# Patient Record
Sex: MALE | Race: BLACK OR AFRICAN AMERICAN | Employment: OTHER | URBAN - METROPOLITAN AREA
[De-identification: names, ages, dates, MRNs, and addresses within clinical notes are randomized per-mention and may not be internally consistent; named-entity substitution may affect disease eponyms.]

---

## 2021-01-26 ENCOUNTER — TELEPHONE (OUTPATIENT)
Dept: NEUROLOGY | Age: 66
End: 2021-01-26

## 2021-01-26 NOTE — TELEPHONE ENCOUNTER
Called, spoke to pt.    Pt state she will contact her provider to find another facility closer to her

## 2021-02-16 ENCOUNTER — OFFICE VISIT (OUTPATIENT)
Dept: NEUROLOGY | Age: 66
End: 2021-02-16
Payer: MEDICARE

## 2021-02-16 VITALS — BODY MASS INDEX: 29.8 KG/M2 | WEIGHT: 220 LBS | RESPIRATION RATE: 16 BRPM | OXYGEN SATURATION: 98 % | HEIGHT: 72 IN

## 2021-02-16 DIAGNOSIS — M54.12 CERVICAL RADICULOPATHY: ICD-10-CM

## 2021-02-16 DIAGNOSIS — G56.23 ULNAR NEUROPATHY OF BOTH UPPER EXTREMITIES: Primary | ICD-10-CM

## 2021-02-16 DIAGNOSIS — G56.11 RIGHT MEDIAN NERVE NEUROPATHY: ICD-10-CM

## 2021-02-16 PROCEDURE — 95911 NRV CNDJ TEST 9-10 STUDIES: CPT | Performed by: PSYCHIATRY & NEUROLOGY

## 2021-02-16 PROCEDURE — 95886 MUSC TEST DONE W/N TEST COMP: CPT | Performed by: PSYCHIATRY & NEUROLOGY

## 2021-02-16 NOTE — PROGRESS NOTES
[address] [facility]  [address] [facility], [address] [place] [address]  Phone [phone] Fax [phone]  Test Date:  2021    Patient: Camille Esparza : 1955 Physician: Renard Kenny MD   Sex: Male Height: 6' 0\" Ref Phys: M.DIAN Hector MD   ID#: [id number] Weight: 220 lbs. Technician: Anjali Danielle     Patient Complaints:    Bilateral upper extremity distal numbness, left > right    Patient History / Exam:    Camille Esparza is a 76year old RH male referred to [facility] neurology clinic for evaluation of several month history of bilateral upper distal upper extremity/hand numbness, left greater than right. Examination demonstrates 5 out of 5 strength with exception of of FDI and ADM which are 4-5 as well as bilateral ABP which is also 4/5. Referred for evaluation of peripheral neuropathy, polyradiculopathy. NCV & EMG Findings:  Evaluation of the left median motor nerve showed prolonged distal onset latency (4.5 ms) and decreased conduction velocity (Elbow-Wrist, 49 m/s). The left ulnar motor nerve showed decreased conduction velocity (A Elbow-B Elbow, 50 m/s). The right ulnar motor nerve showed decreased conduction velocity (B Elbow-Wrist, 48 m/s) and decreased conduction velocity (A Elbow-B Elbow, 42 m/s). The left median sensory nerve showed prolonged distal peak latency (3.8 ms) and decreased conduction velocity (Wrist-2nd Digit, 37 m/s). The left ulnar sensory nerve showed no response (Wrist). The right ulnar sensory nerve showed reduced amplitude (4.8 µV). All remaining nerves  were within normal limits. All F Wave latencies were within normal limits. Electromyography (EMG) of the left FDI, ABP, biceps and triceps were unremarkable. EMG of left deltoid demonstrated chronic reinnervation changes in the form of large amplitude motor units with reduced recruitment and increased duration.   EMG of upper cervical paraspinal muscles demonstrated increased insertional activity. Impression: This is an abnormal NCS/EMG characterized by evidence for bilateral nonlocalized ulnar neuropathy suspected to be at the elbow bilaterally as well as evidence for left median neuropathy also nonlocalized though suspected to be at the wrist by probability. There is incidental finding of probable C5/6 (primary C5) active and ongoing radiculopathy which is not related to patient's complaints. Recommendations:    Discussed possibility of MRI of cervical spine through patient's primary care, also discussed behavioral management and bracing for suspected median and ulnar neuropathies.   Mention that we would be happy to see the patient here in [facility] if referral to neurology was felt necessary versus a more local provider.  ___________________________  Bri Diaz MD        Nerve Conduction Studies  Anti Sensory Summary Table     Stim Site NR Peak (ms) Norm Peak (ms) P-T Amp (µV) Norm P-T Amp Onset (ms) Site1 Site2 Delta-P (ms) Dist (cm) Carlos (m/s) Norm Carlos (m/s)   Left Median Anti Sensory (2nd Digit)  30°C   Wrist    3.8 <3.6 14.2 >10 3.0 Wrist 2nd Digit 3.8 14.0 37 >39   Right Median Anti Sensory (2nd Digit)  30°C   Wrist    3.5 <3.6 20.6 >10 2.4 Wrist 2nd Digit 3.5 14.0 40 >39   Left Radial Anti Sensory (Base 1st Digit)  30°C   Wrist    2.3 <3.1 25.8  1.6 Wrist Base 1st Digit 2.3 0.0     Left Ulnar Anti Sensory (5th Digit)  30°C   Wrist NR  <3.7  >15.0  Wrist 5th Digit  14.0  >38   Right Ulnar Anti Sensory (5th Digit)  30°C   Wrist    3.5 <3.7 4.8 >15.0 2.8 Wrist 5th Digit 3.5 14.0 40 >38     Motor Summary Table     Stim Site NR Onset (ms) Norm Onset (ms) O-P Amp (mV) Norm O-P Amp Site1 Site2 Delta-0 (ms) Dist (cm) Carlos (m/s) Norm Carlos (m/s)   Left Median Motor (Abd Poll Brev)  30°C   Wrist    4.5 <4.2 6.1 >5 Elbow Wrist 4.9 24.0 49 >50   Elbow    9.4  5.7          Right Median Motor (Abd Poll Brev)  30°C   Wrist    4.0 <4.2 6.8 >5 Elbow Wrist 4.5 25.0 56 >50   Elbow    8.5  6.6          Left Ulnar Motor (Abd Dig Minimi)  30°C   Wrist    3.0 <4.2 11.3 >3 B Elbow Wrist 4.5 24.0 53 >53   B Elbow    7.5  9.0  A Elbow B Elbow 2.0 10.0 50 >53   A Elbow    9.5  10.8          Right Ulnar Motor (Abd Dig Minimi)  30°C   Wrist    2.8 <4.2 9.3 >3 B Elbow Wrist 5.0 24.0 48 >53   B Elbow    7.8  9.7  A Elbow B Elbow 2.4 10.0 42 >53   A Elbow    10.2  9.4            Comparison Summary Table     Stim Site NR Peak (ms) Norm Peak (ms) P-T Amp (µV) Site1 Site2 Delta-P (ms) Norm Delta (ms)   Left Median/Ulnar Palm Comparison (Wrist - 8cm)  30°C   Median Palm    2.0 <2.5 9.0 Median Palm Ulnar Palm 0.2 <0.3   Ulnar Palm    1.8 <2.5 1.7         F Wave Studies     NR F-Lat (ms) Lat Norm (ms) L-R F-Lat (ms) L-R Lat Norm   Left Ulnar (Mrkrs) (Abd Dig Min)  30°C      30.54 <36  <2.5     EMG     Side Muscle Nerve Root Ins Act Fibs Psw Amp Dur Poly Recrt Int Pat Comment   Left Abd Poll Brev Median C8-T1 Nml Nml Nml Nml Nml 0 Nml Nml    Left 1stDorInt Ulnar C8-T1 Nml Nml Nml Nml Nml 0 Nml Nml    Left Biceps Musculocut C5-6 Nml Nml Nml Nml Nml 0 Nml Nml    Left Triceps Radial C6-7-8 Nml Nml Nml Nml Nml 0 Nml Nml    Left Deltoid Axillary C5-6 Nml Nml Nml Incr Incr 0 Red Nml    Left Cervical Parasp Mid Rami C4-6 Incr Nml Nml               Waveforms:

## 2021-03-17 ENCOUNTER — HOSPITAL ENCOUNTER (OUTPATIENT)
Dept: LAB | Age: 66
Discharge: HOME | End: 2021-03-17
Payer: MEDICARE

## 2021-03-17 ENCOUNTER — OFFICE VISIT (OUTPATIENT)
Dept: ORTHOPEDIC SURGERY | Age: 66
End: 2021-03-17
Payer: MEDICARE

## 2021-03-17 ENCOUNTER — HOSPITAL ENCOUNTER (OUTPATIENT)
Dept: GENERAL RADIOLOGY | Age: 66
Discharge: HOME | End: 2021-03-17
Payer: MEDICARE

## 2021-03-17 ENCOUNTER — HOSPITAL ENCOUNTER (OUTPATIENT)
Dept: NON INVASIVE DIAGNOSTICS | Age: 66
Discharge: HOME | End: 2021-03-17
Payer: MEDICARE

## 2021-03-17 VITALS — BODY MASS INDEX: 30.94 KG/M2 | HEIGHT: 71 IN | WEIGHT: 221 LBS

## 2021-03-17 DIAGNOSIS — G56.02 CARPAL TUNNEL SYNDROME ON LEFT: ICD-10-CM

## 2021-03-17 DIAGNOSIS — G56.01 CARPAL TUNNEL SYNDROME ON RIGHT: ICD-10-CM

## 2021-03-17 DIAGNOSIS — M25.531 RIGHT WRIST PAIN: ICD-10-CM

## 2021-03-17 DIAGNOSIS — G56.01 CARPAL TUNNEL SYNDROME ON RIGHT: Primary | ICD-10-CM

## 2021-03-17 DIAGNOSIS — M25.532 LEFT WRIST PAIN: Primary | ICD-10-CM

## 2021-03-17 LAB
ANION GAP SERPL CALC-SCNC: 10 MMOL/L (ref 5–15)
BASOPHILS # BLD: 0 K/UL (ref 0–0.2)
BASOPHILS NFR BLD: 1 % (ref 0–2.5)
BUN SERPL-MCNC: 17 MG/DL (ref 6–20)
BUN/CREAT SERPL: 11 (ref 12–20)
CA-I BLD-MCNC: 9.1 MG/DL (ref 8.5–10.1)
CHLORIDE SERPL-SCNC: 106 MMOL/L (ref 97–108)
CO2 SERPL-SCNC: 30 MMOL/L (ref 21–32)
CREAT SERPL-MCNC: 1.48 MG/DL (ref 0.7–1.3)
EOSINOPHIL # BLD: 0 K/UL (ref 0–0.7)
EOSINOPHIL NFR BLD: 0 % (ref 0.9–2.9)
ERYTHROCYTE [DISTWIDTH] IN BLOOD BY AUTOMATED COUNT: 13.6 % (ref 11.5–14.5)
GLUCOSE SERPL-MCNC: 118 MG/DL (ref 65–100)
HCT VFR BLD AUTO: 43.3 % (ref 41–53)
HGB BLD-MCNC: 14.4 G/DL (ref 13.5–17.5)
LYMPHOCYTES # BLD: 1.5 K/UL (ref 1–4.8)
LYMPHOCYTES NFR BLD: 15 % (ref 20.5–51.1)
MCH RBC QN AUTO: 31 PG (ref 31–34)
MCHC RBC AUTO-ENTMCNC: 33.2 G/DL (ref 31–36)
MCV RBC AUTO: 93.4 FL (ref 80–100)
MONOCYTES # BLD: 0.5 K/UL (ref 0.2–2.4)
MONOCYTES NFR BLD: 5 % (ref 1.7–9.3)
NEUTS SEG # BLD: 8.3 K/UL (ref 1.8–7.7)
NEUTS SEG NFR BLD: 79 % (ref 42–75)
NRBC # BLD: 0 K/UL
NRBC BLD-RTO: 0 PER 100 WBC
PLATELET # BLD AUTO: 213 K/UL
PMV BLD AUTO: 7.9 FL (ref 6.5–11.5)
POTASSIUM SERPL-SCNC: 3 MMOL/L (ref 3.5–5.1)
RBC # BLD AUTO: 4.63 M/UL (ref 4.5–5.9)
SODIUM SERPL-SCNC: 146 MMOL/L (ref 136–145)
WBC # BLD AUTO: 10.4 K/UL (ref 4.4–11.3)

## 2021-03-17 PROCEDURE — 3017F COLORECTAL CA SCREEN DOC REV: CPT | Performed by: ORTHOPAEDIC SURGERY

## 2021-03-17 PROCEDURE — 1101F PT FALLS ASSESS-DOCD LE1/YR: CPT | Performed by: ORTHOPAEDIC SURGERY

## 2021-03-17 PROCEDURE — 85025 COMPLETE CBC W/AUTO DIFF WBC: CPT

## 2021-03-17 PROCEDURE — G8536 NO DOC ELDER MAL SCRN: HCPCS | Performed by: ORTHOPAEDIC SURGERY

## 2021-03-17 PROCEDURE — G8427 DOCREV CUR MEDS BY ELIG CLIN: HCPCS | Performed by: ORTHOPAEDIC SURGERY

## 2021-03-17 PROCEDURE — 36415 COLL VENOUS BLD VENIPUNCTURE: CPT

## 2021-03-17 PROCEDURE — G8510 SCR DEP NEG, NO PLAN REQD: HCPCS | Performed by: ORTHOPAEDIC SURGERY

## 2021-03-17 PROCEDURE — G8419 CALC BMI OUT NRM PARAM NOF/U: HCPCS | Performed by: ORTHOPAEDIC SURGERY

## 2021-03-17 PROCEDURE — 71046 X-RAY EXAM CHEST 2 VIEWS: CPT

## 2021-03-17 PROCEDURE — 99203 OFFICE O/P NEW LOW 30 MIN: CPT | Performed by: ORTHOPAEDIC SURGERY

## 2021-03-17 PROCEDURE — 80048 BASIC METABOLIC PNL TOTAL CA: CPT

## 2021-03-17 RX ORDER — POTASSIUM CHLORIDE 20 MEQ/1
TABLET, EXTENDED RELEASE ORAL
COMMUNITY
Start: 2020-12-29

## 2021-03-17 RX ORDER — LANOLIN ALCOHOL/MO/W.PET/CERES
CREAM (GRAM) TOPICAL
COMMUNITY
Start: 2020-12-29

## 2021-03-17 RX ORDER — PREDNISONE 10 MG/1
TABLET ORAL
COMMUNITY
Start: 2021-02-26

## 2021-03-17 RX ORDER — AMLODIPINE BESYLATE 10 MG/1
TABLET ORAL
COMMUNITY
Start: 2021-02-26

## 2021-03-17 RX ORDER — OLMESARTAN MEDOXOMIL 40 MG/1
TABLET ORAL
COMMUNITY
Start: 2021-02-26

## 2021-03-17 RX ORDER — HYDRALAZINE HYDROCHLORIDE 50 MG/1
TABLET, FILM COATED ORAL
COMMUNITY
Start: 2021-01-23 | End: 2021-03-17 | Stop reason: ALTCHOICE

## 2021-03-17 RX ORDER — HYDRALAZINE HYDROCHLORIDE 25 MG/1
TABLET, FILM COATED ORAL
COMMUNITY
Start: 2020-12-29 | End: 2021-04-07 | Stop reason: ALTCHOICE

## 2021-03-17 RX ORDER — ROSUVASTATIN CALCIUM 10 MG/1
TABLET, COATED ORAL
COMMUNITY
Start: 2021-02-26

## 2021-03-17 NOTE — PROGRESS NOTES
Name: Jonahtan Harry    : 1955     Service Dept: [address] [place] [address] and Sports Medicine    Patient's Pharmacies:    [facility] [address] ALYSON GAMA [facility] [address]  [address] [facility] [address]  Phone: [phone] Fax: [phone]       Chief Complaint   Patient presents with    Hand Pain        Visit Vitals  Ht 5' 11\" (1.803 m)   Wt 221 lb (100.2 kg)   BMI 30.82 kg/m²      No Known Allergies   Current Outpatient Medications   Medication Sig Dispense Refill    potassium chloride (K-DUR, KLOR-CON) 20 mEq tablet TAKE 1 BY MOUTH THREE TIMES DAILY      magnesium oxide (MAG-OX) 400 mg tablet TAKE 1 TABLET BY MOUTH TWICE DAILY      olmesartan (BENICAR) 40 mg tablet       rosuvastatin (CRESTOR) 10 mg tablet       predniSONE (DELTASONE) 10 mg tablet       amLODIPine (NORVASC) 10 mg tablet       hydrALAZINE (APRESOLINE) 25 mg tablet TAKE 1 TABLET BY MOUTH TWICE DAILY        There is no problem list on file for this patient. History reviewed. No pertinent family history. Social History     Socioeconomic History    Marital status:      Spouse name: Not on file    Number of children: Not on file    Years of education: Not on file    Highest education level: Not on file   Tobacco Use    Smoking status: Never Smoker    Smokeless tobacco: Never Used   Substance and Sexual Activity    Alcohol use: Not Currently      History reviewed. No pertinent surgical history. Past Medical History:   Diagnosis Date    Hypertension         I have reviewed and agree with [address] [place] [address] Nw and ROS and intake form in chart and the record furthermore I have reviewed prior medical record(s) regarding this patients care during this appointment.      Review of Systems:   Patient is a pleasant appearing individual, appropriately dressed, well hydrated, well nourished, who is alert, appropriately oriented for age, and in no acute distress with a normal gait and normal affect who does not appear to be in any significant pain. Physical Exam:  Left Hand - Negative Carpal Compression test, Full Range of motion of the wrist, No Instability, Positive for numbness extending from the elbow to the 4th and 5th digit, Mild swelling, Decreased  strength, Positive for muscle atrophy, Good cap refill. Right Hand - Negative Carpal Compression test, Full Range of motion of the wrist, No Instability, Positive for numbness extending from the elbow to the 4th and 5th digit, Mild swelling, Decreased  strength, Positive for muscle atrophy, Good cap refill. A consent for surgery will be documented and signed by the patient or a legal guardian. All questions were answered. The risks of surgery were explained to the patient which include but not limited to infection, reoperation, multiple operations, nerve injury, artery injury, tendon injury, poor result, poor wound healing, unforeseen incidence, etc. Patient was told of no guarantees. Patient accepts all risks and benefits    The patient was counseled about the risks of aidan Covid-19 during their perioperative period and any recovery window from their procedure. The patient was made aware that aidan Covid-19 may worsen their prognosis for recovering from their procedure and lend to a higher morbidity and/or mortality risk. All material risks, benefits, and reasonable alternatives including postponing the procedure were discussed. The patient DOES wish to proceed with their procedure at this time. Encounter Diagnoses     ICD-10-CM ICD-9-CM   1. Carpal tunnel syndrome on right  G56.01 354.0   2. Carpal tunnel syndrome on left  G56.02 354.0       HPI:  The patient is here with a chief complaint of bilateral upper extremity numbness and tingling and ulnar nerve entrapment. Post EMG for both. Continues to have difficulty. Pain is 8/10. ROS:  10-point review of systems is positive for joint infection and locking.     X-rays are unremarkable. Assessment/Plan:  1. Left ulnar nerve entrapment and carpal tunnel syndrome. Plan would be for left carpal tunnel release and ulnar nerve in transposition. Risks and benefits of surgery explained to him. We are going to proceed with basic blood work, chest x-ray, EKG, no medical clearance, outpatient surgery. As part of continued conservative pain management options the patient was advised to utilize Tylenol or OTC NSAIDS as long as it is not medically contraindicated. Return to Office: Follow-up and Dispositions    · Return for [facility] for surgery. Scribed by Angelic Peters MD as dictated by Tracie Padilla. Cheyenne Perez MD.  Documentation True and Accepted Manuel Perez MD

## 2021-03-17 NOTE — PATIENT INSTRUCTIONS
Carpal Tunnel Syndrome: Care Instructions Overview Carpal tunnel syndrome is numbness, tingling, weakness, and pain in your hand, wrist, and sometimes forearm. It is caused by pressure on the median nerve. This nerve and several tough tissues called tendons run through a space in the wrist. This space is called the carpal tunnel. The repeated hand motions used in work and some hobbies and sports can put pressure on the median nerve. Pregnancy can cause carpal tunnel syndrome. Several conditions, such as diabetes, arthritis, and an underactive thyroid, can also cause it. You may be able to limit an activity or change the way you do it to reduce your symptoms. You also can take other steps to feel better. If your symptoms are mild, 1 to 2 weeks of home treatment are likely to ease your pain. Surgery is needed only if other treatments do not work. Follow-up care is a key part of your treatment and safety. Be sure to make and go to all appointments, and call your doctor if you are having problems. It's also a good idea to know your test results and keep a list of the medicines you take. How can you care for yourself at home? · If possible, stop or reduce the activity that causes your symptoms. If you cannot stop the activity, take frequent breaks to rest and stretch or change hand positions to do a task. Try switching hands, such as when using a computer mouse. · Try to avoid bending or twisting your wrists. · Ask your doctor if you can take an over-the-counter pain medicine, such as acetaminophen (Tylenol), ibuprofen (Advil, Motrin), or naproxen (Aleve). Be safe with medicines. Read and follow all instructions on the label. · If your doctor prescribes corticosteroid medicine to help reduce pain and swelling, take it exactly as prescribed. Call your doctor if you think you are having a problem with your medicine. · Put ice or a cold pack on your wrist for 10 to 20 minutes at a time to ease pain.  Put a thin cloth between the ice and your skin. · If your doctor or your physical or occupational therapist tells you to wear a wrist splint, wear it as directed to keep your wrist in a neutral position. This also eases pressure on your median nerve. · Ask your doctor whether you should have physical or occupational therapy to learn how to do tasks differently. · Try a yoga class to stretch your muscles and build strength in your hands and wrists. Yoga has been shown to ease carpal tunnel symptoms. To prevent carpal tunnel · When working at a computer, keep your hands and wrists in line with your forearms. Hold your elbows close to your sides. Take a break every 10 to 15 minutes. · Try these exercises: 
? Warm up: Rotate your wrist up, down, and from side to side. Repeat this 4 times. Stretch your fingers far apart, relax them, then stretch them again. Repeat 4 times. Stretch your thumb by pulling it back gently, holding it, and then releasing it. Repeat 4 times. ? Prayer stretch: Start with your palms together in front of your chest just below your chin. Slowly lower your hands toward your waistline while keeping your hands close to your stomach and your palms together until you feel a mild to moderate stretch under your forearms. Hold for 10 to 20 seconds. Repeat 4 times. ? Wrist flexor stretch: Hold your arm in front of you with your palm up. Bend your wrist, pointing your hand toward the floor. With your other hand, gently bend your wrist further until you feel a mild to moderate stretch in your forearm. Hold for 10 to 20 seconds. Repeat 4 times. ? Wrist extensor stretch: Repeat the steps for the wrist flexor stretch, but begin with your extended hand palm down. · Squeeze a rubber exercise ball several times a day to keep your hands and fingers strong. · Avoid holding objects (such as a book) in one position for a long time. When possible, use your whole hand to grasp an object.  Using just the thumb and index finger can put stress on the wrist. 
· Do not smoke. It can make this condition worse by reducing blood flow to the median nerve. If you need help quitting, talk to your doctor about stop-smoking programs and medicines. These can increase your chances of quitting for good. When should you call for help? Watch closely for changes in your health, and be sure to contact your doctor if: 
  · Your pain or other problems do not get better with home care.  
  · You want more information about physical or occupational therapy.  
  · You have side effects of your corticosteroid medicine, such as: 
? Weight gain. ? Mood changes. ? Trouble sleeping. ? Bruising easily.  
  · You have any other problems with your medicine. Where can you learn more? Go to http://www.gray.com/ Enter R432 in the search box to learn more about \"Carpal Tunnel Syndrome: Care Instructions. \" Current as of: March 2, 2020               Content Version: 12.6 © 5644-9091 [employer]. Care instructions adapted under license by [employer] (which disclaims liability or warranty for this information). If you have questions about a medical condition or this instruction, always ask your healthcare professional. [place] [address] any warranty or liability for your use of this information.

## 2021-03-22 ENCOUNTER — OFFICE VISIT (OUTPATIENT)
Dept: ORTHOPEDIC SURGERY | Age: 66
End: 2021-03-22
Payer: MEDICARE

## 2021-03-22 ENCOUNTER — HOSPITAL ENCOUNTER (OUTPATIENT)
Dept: PREADMISSION TESTING | Age: 66
Discharge: HOME | End: 2021-03-22
Payer: MEDICARE

## 2021-03-22 ENCOUNTER — ANESTHESIA EVENT (OUTPATIENT)
Dept: SURGERY | Age: 66
End: 2021-03-22
Payer: MEDICARE

## 2021-03-22 DIAGNOSIS — G56.02 CARPAL TUNNEL SYNDROME ON LEFT: Primary | ICD-10-CM

## 2021-03-22 DIAGNOSIS — M25.532 LEFT WRIST PAIN: ICD-10-CM

## 2021-03-22 DIAGNOSIS — G56.22 ULNAR NERVE ENTRAPMENT AT ELBOW, LEFT: ICD-10-CM

## 2021-03-22 LAB — SARS-COV-2, COV2: NORMAL

## 2021-03-22 PROCEDURE — U0003 INFECTIOUS AGENT DETECTION BY NUCLEIC ACID (DNA OR RNA); SEVERE ACUTE RESPIRATORY SYNDROME CORONAVIRUS 2 (SARS-COV-2) (CORONAVIRUS DISEASE [COVID-19]), AMPLIFIED PROBE TECHNIQUE, MAKING USE OF HIGH THROUGHPUT TECHNOLOGIES AS DESCRIBED BY CMS-2020-01-R: HCPCS

## 2021-03-22 PROCEDURE — 3017F COLORECTAL CA SCREEN DOC REV: CPT | Performed by: ORTHOPAEDIC SURGERY

## 2021-03-22 PROCEDURE — 1101F PT FALLS ASSESS-DOCD LE1/YR: CPT | Performed by: ORTHOPAEDIC SURGERY

## 2021-03-22 PROCEDURE — G8432 DEP SCR NOT DOC, RNG: HCPCS | Performed by: ORTHOPAEDIC SURGERY

## 2021-03-22 PROCEDURE — G8427 DOCREV CUR MEDS BY ELIG CLIN: HCPCS | Performed by: ORTHOPAEDIC SURGERY

## 2021-03-22 PROCEDURE — 99214 OFFICE O/P EST MOD 30 MIN: CPT | Performed by: ORTHOPAEDIC SURGERY

## 2021-03-22 PROCEDURE — G8417 CALC BMI ABV UP PARAM F/U: HCPCS | Performed by: ORTHOPAEDIC SURGERY

## 2021-03-22 PROCEDURE — G8536 NO DOC ELDER MAL SCRN: HCPCS | Performed by: ORTHOPAEDIC SURGERY

## 2021-03-22 RX ORDER — OXYCODONE AND ACETAMINOPHEN 5; 325 MG/1; MG/1
1 TABLET ORAL
Qty: 30 TAB | Refills: 0 | Status: SHIPPED | OUTPATIENT
Start: 2021-03-22 | End: 2021-04-05

## 2021-03-22 RX ORDER — ONDANSETRON 4 MG/1
4 TABLET, ORALLY DISINTEGRATING ORAL AS NEEDED
Qty: 10 TAB | Refills: 0 | Status: SHIPPED | OUTPATIENT
Start: 2021-03-22

## 2021-03-22 NOTE — PATIENT INSTRUCTIONS
Arm Pain: Care Instructions Your Care Instructions You can hurt your arm by using it too much or by injuring it. Biking, wrestling, and home repair projects are examples of activities that can lead to arm pain. Everyday wear and tear, especially as you get older, can cause arm pain. Your forearms, wrists, hands, and fingers are the parts of your arm that are most likely to become painful. A minor arm injury usually will heal on its own with home treatment to relieve swelling and pain. If you have a more serious injury, you may need tests and treatment. Follow-up care is a key part of your treatment and safety. Be sure to make and go to all appointments, and call your doctor if you are having problems. It's also a good idea to know your test results and keep a list of the medicines you take. How can you care for yourself at home? · Take pain medicines exactly as directed. ? If the doctor gave you a prescription medicine for pain, take it as prescribed. ? If you are not taking a prescription pain medicine, ask your doctor if you can take an over-the-counter medicine. · Rest and protect your arm. Take a break from any activity that may cause pain. · Put ice or a cold pack on your arm for 10 to 20 minutes at a time. Put a thin cloth between the ice and your skin. · Prop up the sore arm on a pillow when you ice it or anytime you sit or lie down during the next 3 days. Try to keep it above the level of your heart. This will help reduce swelling. · If your doctor recommends a sling to support your arm, wear it as directed. When should you call for help? Call 911 anytime you think you may need emergency care. For example, call if: 
  · Your arm or hand is cool or pale or changes color. Call your doctor now or seek immediate medical care if: 
  · You cannot use your arm.  
  · You have signs of infection, such as: 
? Increased pain, swelling, warmth, or redness.  
? Red streaks running up or down your arm. 
? Pus draining from an area of your arm. ? A fever.  
  · You have tingling, weakness, or numbness in your arm. Watch closely for changes in your health, and be sure to contact your doctor if: 
  · You do not get better as expected. Where can you learn more? Go to http://www.boland.com/ Enter B641 in the search box to learn more about \"Arm Pain: Care Instructions. \" Current as of: June 26, 2019               Content Version: 12.6 © 2111-9283 [employer], [employer]. Care instructions adapted under license by [employer] (which disclaims liability or warranty for this information). If you have questions about a medical condition or this instruction, always ask your healthcare professional. [address] any warranty or liability for your use of this information.

## 2021-03-22 NOTE — PROGRESS NOTES
Name: Francis Almonte    : 1955     Service Dept: [address] [place] [address] and Sports Medicine    Patient's Pharmacies:    [address] [place] [address] [address] [facility], [facility]  [address] [address]  Phone: [phone] Fax: [phone]       Chief Complaint   Patient presents with    Hand Pain    Pre-op Exam        There were no vitals taken for this visit. No Known Allergies   Current Outpatient Medications   Medication Sig Dispense Refill    potassium chloride (K-DUR, KLOR-CON) 20 mEq tablet TAKE 1 BY MOUTH THREE TIMES DAILY      magnesium oxide (MAG-OX) 400 mg tablet TAKE 1 TABLET BY MOUTH TWICE DAILY      olmesartan (BENICAR) 40 mg tablet       rosuvastatin (CRESTOR) 10 mg tablet       predniSONE (DELTASONE) 10 mg tablet       amLODIPine (NORVASC) 10 mg tablet       hydrALAZINE (APRESOLINE) 25 mg tablet TAKE 1 TABLET BY MOUTH TWICE DAILY        There is no problem list on file for this patient. History reviewed. No pertinent family history. Social History     Socioeconomic History    Marital status:      Spouse name: Not on file    Number of children: Not on file    Years of education: Not on file    Highest education level: Not on file   Tobacco Use    Smoking status: Never Smoker    Smokeless tobacco: Never Used   Substance and Sexual Activity    Alcohol use: Not Currently      History reviewed. No pertinent surgical history. Past Medical History:   Diagnosis Date    Hypertension         I have reviewed and agree with [address] [place] [address] and ROS and intake form in chart and the record furthermore I have reviewed prior medical record(s) regarding this patients care during this appointment.      Review of Systems:   Patient is a pleasant appearing individual, appropriately dressed, well hydrated, well nourished, who is alert, appropriately oriented for age, and in no acute distress with a normal gait and normal affect who does not appear to be in any significant pain. Physical Exam:  Left Wrist - Positive Carpal Compression test, Full Range of motion of the wrist, No Instability, Positive for numbness, Mild swelling, Decreased  strength, Positive for muscle atrophy, Good cap refill. Right Wrist - Negative for Carpal Compression test, Ful Range of motion of the wrist, No Instability, No Numbness, No Swelling, No Weakness, No Muscle atrophy, Good cap refill. Encounter Diagnoses     ICD-10-CM ICD-9-CM   1. Carpal tunnel syndrome on left  G56.02 354.0   2. Ulnar nerve entrapment at elbow, left  G56.22 354.2   3. Left wrist pain  M25.532 719.43       HPI:  The patient is here with a chief complaint of left lower extremity numbness and tingling, diagnosed with carpal tunnel syndrome, and also ulnar nerve entrapment. It has been the same. Nothing has helped. Pain is 8/10. ROS:  10-point review of systems is positive for joint swelling and locking. X-rays of the left hand were unremarkable. Assessment/Plan:  1. Left upper extremity with carpal tunnel syndrome and ulnar nerve entrapment. Plan would be for left carpal tunnel release and ulnar nerve transposition, Percocet for pain, outpatient surgery. As part of continued conservative pain management options the patient was advised to utilize Tylenol or OTC NSAIDS as long as it is not medically contraindicated. Return to Office: Follow-up and Dispositions    · Return for already scheduled for surgery. Scribed by Fabi Batres LPN as dictated by [facility] MARIBEL Kelly MD.  Documentation True and Accepted Manuel Kelly MD

## 2021-03-22 NOTE — H&P (VIEW-ONLY)
Name: Claudene Chew   
: 1955 Service Dept: [address] [place] [address] and [facility] Patient's Pharmacies: 
 
40[address] [facility] 8955 [facility], 7168 [facility] 
0557 [facility] [address] [id number] Phone: [phone] Fax: [phone] Chief Complaint Patient presents with  
 Hand Pain  Pre-op Exam  
 
  
There were no vitals taken for this visit. No Known Allergies Current Outpatient Medications Medication Sig Dispense Refill  potassium chloride (K-DUR, KLOR-CON) 20 mEq tablet TAKE 1 BY MOUTH THREE TIMES DAILY  magnesium oxide (MAG-OX) 400 mg tablet TAKE 1 TABLET BY MOUTH TWICE DAILY  olmesartan (BENICAR) 40 mg tablet  rosuvastatin (CRESTOR) 10 mg tablet  predniSONE (DELTASONE) 10 mg tablet  amLODIPine (NORVASC) 10 mg tablet  hydrALAZINE (APRESOLINE) 25 mg tablet TAKE 1 TABLET BY MOUTH TWICE DAILY There is no problem list on file for this patient. History reviewed. No pertinent family history. Social History Socioeconomic History  Marital status:  Spouse name: Not on file  Number of children: Not on file  Years of education: Not on file  Highest education level: Not on file Tobacco Use  Smoking status: Never Smoker  Smokeless tobacco: Never Used Substance and Sexual Activity  Alcohol use: Not Currently History reviewed. No pertinent surgical history. Past Medical History:  
Diagnosis Date  Hypertension I have reviewed and agree with [address] [place] [address] Nw and ROS and intake form in chart and the record furthermore I have reviewed prior medical record(s) regarding this patients care during this appointment.   
 
Review of Systems:  
Patient is a pleasant appearing individual, appropriately dressed, well hydrated, well nourished, who is alert, appropriately oriented for age, and in no acute distress with a normal gait and normal affect who does not appear to be in any significant pain. Physical Exam: 
Left Wrist - Positive Carpal Compression test, Full Range of motion of the wrist, No Instability, Positive for numbness, Mild swelling, Decreased  strength, Positive for muscle atrophy, Good cap refill. Right Wrist - Negative for Carpal Compression test, Ful Range of motion of the wrist, No Instability, No Numbness, No Swelling, No Weakness, No Muscle atrophy, Good cap refill. Encounter Diagnoses ICD-10-CM ICD-9-CM 1. Carpal tunnel syndrome on left  G56.02 354.0 2. Ulnar nerve entrapment at elbow, left  G56.22 354.2 3. Left wrist pain  M25.532 719.43  
 
 
HPI: 
The patient is here with a chief complaint of left lower extremity numbness and tingling, diagnosed with carpal tunnel syndrome, and also ulnar nerve entrapment. It has been the same. Nothing has helped. Pain is 8/10. ROS: 
10-point review of systems is positive for joint swelling and locking. X-rays of the left hand were unremarkable. Assessment/Plan: 1. Left upper extremity with carpal tunnel syndrome and ulnar nerve entrapment. Plan would be for left carpal tunnel release and ulnar nerve transposition, Percocet for pain, outpatient surgery. As part of continued conservative pain management options the patient was advised to utilize Tylenol or OTC NSAIDS as long as it is not medically contraindicated. Return to Office: Follow-up and Dispositions · Return for already scheduled for surgery. Scribed by Fabi Batres LPN as dictated by [facility] MARIBEL Kelly MD. Documentation True and Accepted Manuel Kelly MD

## 2021-03-23 LAB — SARS-COV-2, COV2NT: NOT DETECTED

## 2021-03-29 ENCOUNTER — HOSPITAL ENCOUNTER (OUTPATIENT)
Age: 66
Discharge: HOME | End: 2021-03-29
Attending: ORTHOPAEDIC SURGERY | Admitting: ORTHOPAEDIC SURGERY
Payer: MEDICARE

## 2021-03-29 ENCOUNTER — ANESTHESIA (OUTPATIENT)
Dept: SURGERY | Age: 66
End: 2021-03-29
Payer: MEDICARE

## 2021-03-29 VITALS
WEIGHT: 222 LBS | OXYGEN SATURATION: 95 % | HEIGHT: 71 IN | SYSTOLIC BLOOD PRESSURE: 156 MMHG | DIASTOLIC BLOOD PRESSURE: 85 MMHG | RESPIRATION RATE: 17 BRPM | BODY MASS INDEX: 31.08 KG/M2 | HEART RATE: 68 BPM | TEMPERATURE: 96.8 F

## 2021-03-29 PROBLEM — M17.9 KNEE OSTEOARTHRITIS: Status: ACTIVE | Noted: 2021-03-29

## 2021-03-29 PROCEDURE — 77030000032 HC CUF TRNQT ZIMM -B: Performed by: ORTHOPAEDIC SURGERY

## 2021-03-29 PROCEDURE — 93005 ELECTROCARDIOGRAM TRACING: CPT

## 2021-03-29 PROCEDURE — 76210000021 HC REC RM PH II 0.5 TO 1 HR: Performed by: ORTHOPAEDIC SURGERY

## 2021-03-29 PROCEDURE — 77030006605 HC BLD CRPL IN BIOM -C: Performed by: ORTHOPAEDIC SURGERY

## 2021-03-29 PROCEDURE — 74011250636 HC RX REV CODE- 250/636: Performed by: NURSE ANESTHETIST, CERTIFIED REGISTERED

## 2021-03-29 PROCEDURE — 74011250637 HC RX REV CODE- 250/637: Performed by: ORTHOPAEDIC SURGERY

## 2021-03-29 PROCEDURE — 77030002933 HC SUT MCRYL J&J -A: Performed by: ORTHOPAEDIC SURGERY

## 2021-03-29 PROCEDURE — 76210000006 HC OR PH I REC 0.5 TO 1 HR: Performed by: ORTHOPAEDIC SURGERY

## 2021-03-29 PROCEDURE — 74011000258 HC RX REV CODE- 258: Performed by: ORTHOPAEDIC SURGERY

## 2021-03-29 PROCEDURE — 77030012406 HC DRN WND PENRS BARD -A: Performed by: ORTHOPAEDIC SURGERY

## 2021-03-29 PROCEDURE — 77030040356 HC CORD BPLR FRCP COVD -A: Performed by: ORTHOPAEDIC SURGERY

## 2021-03-29 PROCEDURE — 76010000153 HC OR TIME 1.5 TO 2 HR: Performed by: ORTHOPAEDIC SURGERY

## 2021-03-29 PROCEDURE — 77030031139 HC SUT VCRL2 J&J -A: Performed by: ORTHOPAEDIC SURGERY

## 2021-03-29 PROCEDURE — 74011000250 HC RX REV CODE- 250: Performed by: ORTHOPAEDIC SURGERY

## 2021-03-29 PROCEDURE — 76060000034 HC ANESTHESIA 1.5 TO 2 HR: Performed by: ORTHOPAEDIC SURGERY

## 2021-03-29 PROCEDURE — 77030010509 HC AIRWY LMA MSK TELE -A: Performed by: NURSE ANESTHETIST, CERTIFIED REGISTERED

## 2021-03-29 PROCEDURE — 77030014007 HC SPNG HEMSTAT J&J -B: Performed by: ORTHOPAEDIC SURGERY

## 2021-03-29 PROCEDURE — 74011000272 HC RX REV CODE- 272: Performed by: ORTHOPAEDIC SURGERY

## 2021-03-29 PROCEDURE — 2709999900 HC NON-CHARGEABLE SUPPLY: Performed by: ORTHOPAEDIC SURGERY

## 2021-03-29 PROCEDURE — 77030006689 HC BLD OPHTH BVR BD -A: Performed by: ORTHOPAEDIC SURGERY

## 2021-03-29 PROCEDURE — 77030041614 HC WRP CLD THER BREG -B: Performed by: ORTHOPAEDIC SURGERY

## 2021-03-29 PROCEDURE — 77030002916 HC SUT ETHLN J&J -A: Performed by: ORTHOPAEDIC SURGERY

## 2021-03-29 PROCEDURE — 74011250636 HC RX REV CODE- 250/636: Performed by: ORTHOPAEDIC SURGERY

## 2021-03-29 RX ORDER — KETOROLAC TROMETHAMINE 30 MG/ML
INJECTION, SOLUTION INTRAMUSCULAR; INTRAVENOUS AS NEEDED
Status: DISCONTINUED | OUTPATIENT
Start: 2021-03-29 | End: 2021-03-29 | Stop reason: HOSPADM

## 2021-03-29 RX ORDER — FENTANYL CITRATE 50 UG/ML
INJECTION, SOLUTION INTRAMUSCULAR; INTRAVENOUS AS NEEDED
Status: DISCONTINUED | OUTPATIENT
Start: 2021-03-29 | End: 2021-03-29 | Stop reason: HOSPADM

## 2021-03-29 RX ORDER — CELECOXIB 200 MG/1
400 CAPSULE ORAL
Status: DISCONTINUED | OUTPATIENT
Start: 2021-03-29 | End: 2021-03-29

## 2021-03-29 RX ORDER — INSULIN LISPRO 100 [IU]/ML
INJECTION, SOLUTION INTRAVENOUS; SUBCUTANEOUS ONCE
Status: DISCONTINUED | OUTPATIENT
Start: 2021-03-29 | End: 2021-03-29 | Stop reason: HOSPADM

## 2021-03-29 RX ORDER — SODIUM CHLORIDE 0.9 % (FLUSH) 0.9 %
5-40 SYRINGE (ML) INJECTION AS NEEDED
Status: DISCONTINUED | OUTPATIENT
Start: 2021-03-29 | End: 2021-03-29 | Stop reason: HOSPADM

## 2021-03-29 RX ORDER — THROMBIN, TOPICAL (BOVINE) 20000 UNIT
KIT TOPICAL AS NEEDED
Status: DISCONTINUED | OUTPATIENT
Start: 2021-03-29 | End: 2021-03-29 | Stop reason: HOSPADM

## 2021-03-29 RX ORDER — SODIUM CHLORIDE 0.9 % (FLUSH) 0.9 %
5-40 SYRINGE (ML) INJECTION EVERY 8 HOURS
Status: DISCONTINUED | OUTPATIENT
Start: 2021-03-29 | End: 2021-03-29 | Stop reason: HOSPADM

## 2021-03-29 RX ORDER — OXYCODONE AND ACETAMINOPHEN 5; 325 MG/1; MG/1
2 TABLET ORAL
Status: DISCONTINUED | OUTPATIENT
Start: 2021-03-29 | End: 2021-03-29 | Stop reason: HOSPADM

## 2021-03-29 RX ORDER — CELECOXIB 200 MG/1
400 CAPSULE ORAL
Status: COMPLETED | OUTPATIENT
Start: 2021-03-29 | End: 2021-03-29

## 2021-03-29 RX ORDER — PROPOFOL 10 MG/ML
INJECTION, EMULSION INTRAVENOUS AS NEEDED
Status: DISCONTINUED | OUTPATIENT
Start: 2021-03-29 | End: 2021-03-29 | Stop reason: HOSPADM

## 2021-03-29 RX ORDER — OXYCODONE AND ACETAMINOPHEN 5; 325 MG/1; MG/1
1 TABLET ORAL
Status: DISCONTINUED | OUTPATIENT
Start: 2021-03-29 | End: 2021-03-29 | Stop reason: HOSPADM

## 2021-03-29 RX ORDER — SODIUM CHLORIDE, SODIUM LACTATE, POTASSIUM CHLORIDE, CALCIUM CHLORIDE 600; 310; 30; 20 MG/100ML; MG/100ML; MG/100ML; MG/100ML
25 INJECTION, SOLUTION INTRAVENOUS CONTINUOUS
Status: DISCONTINUED | OUTPATIENT
Start: 2021-03-29 | End: 2021-03-29 | Stop reason: HOSPADM

## 2021-03-29 RX ORDER — FENTANYL CITRATE 50 UG/ML
50 INJECTION, SOLUTION INTRAMUSCULAR; INTRAVENOUS AS NEEDED
Status: COMPLETED | OUTPATIENT
Start: 2021-03-29 | End: 2021-03-29

## 2021-03-29 RX ORDER — LIDOCAINE HYDROCHLORIDE 20 MG/ML
INJECTION, SOLUTION INFILTRATION; PERINEURAL AS NEEDED
Status: DISCONTINUED | OUTPATIENT
Start: 2021-03-29 | End: 2021-03-29 | Stop reason: HOSPADM

## 2021-03-29 RX ORDER — ONDANSETRON 2 MG/ML
4 INJECTION INTRAMUSCULAR; INTRAVENOUS ONCE
Status: DISCONTINUED | OUTPATIENT
Start: 2021-03-29 | End: 2021-03-29 | Stop reason: HOSPADM

## 2021-03-29 RX ORDER — SODIUM CHLORIDE, SODIUM LACTATE, POTASSIUM CHLORIDE, CALCIUM CHLORIDE 600; 310; 30; 20 MG/100ML; MG/100ML; MG/100ML; MG/100ML
INJECTION, SOLUTION INTRAVENOUS
Status: DISCONTINUED | OUTPATIENT
Start: 2021-03-29 | End: 2021-03-29 | Stop reason: HOSPADM

## 2021-03-29 RX ADMIN — FENTANYL CITRATE 50 MCG: 50 INJECTION, SOLUTION INTRAMUSCULAR; INTRAVENOUS at 09:03

## 2021-03-29 RX ADMIN — CEFAZOLIN SODIUM 2 G: 1 INJECTION, POWDER, FOR SOLUTION INTRAMUSCULAR; INTRAVENOUS at 08:33

## 2021-03-29 RX ADMIN — PROPOFOL 200 MG: 10 INJECTION, EMULSION INTRAVENOUS at 08:31

## 2021-03-29 RX ADMIN — KETOROLAC TROMETHAMINE 30 MG: 30 INJECTION, SOLUTION INTRAMUSCULAR at 08:47

## 2021-03-29 RX ADMIN — SODIUM CHLORIDE, POTASSIUM CHLORIDE, SODIUM LACTATE AND CALCIUM CHLORIDE 25 ML/HR: 600; 310; 30; 20 INJECTION, SOLUTION INTRAVENOUS at 06:51

## 2021-03-29 RX ADMIN — FENTANYL CITRATE 50 MCG: 50 INJECTION, SOLUTION INTRAMUSCULAR; INTRAVENOUS at 10:19

## 2021-03-29 RX ADMIN — FENTANYL CITRATE 50 MCG: 50 INJECTION, SOLUTION INTRAMUSCULAR; INTRAVENOUS at 10:23

## 2021-03-29 RX ADMIN — OXYCODONE HYDROCHLORIDE AND ACETAMINOPHEN 2 TABLET: 5; 325 TABLET ORAL at 11:11

## 2021-03-29 RX ADMIN — FENTANYL CITRATE 50 MCG: 50 INJECTION, SOLUTION INTRAMUSCULAR; INTRAVENOUS at 08:50

## 2021-03-29 RX ADMIN — SODIUM CHLORIDE, POTASSIUM CHLORIDE, SODIUM LACTATE AND CALCIUM CHLORIDE: 600; 310; 30; 20 INJECTION, SOLUTION INTRAVENOUS at 08:24

## 2021-03-29 RX ADMIN — CELECOXIB 400 MG: 200 CAPSULE ORAL at 07:02

## 2021-03-29 NOTE — ANESTHESIA POSTPROCEDURE EVALUATION
Procedure(s):  LEFT CARPAL TUNNEL RELEASE  ULNAR NERVE ENTRAPMENT. MAC    Anesthesia Post Evaluation      Multimodal analgesia: multimodal analgesia used between 6 hours prior to anesthesia start to PACU discharge  Patient location during evaluation: bedside  Patient participation: complete - patient participated  Level of consciousness: awake and alert  Pain score: 0  Pain management: adequate  Airway patency: patent  Anesthetic complications: no  Cardiovascular status: acceptable and stable  Respiratory status: acceptable and room air  Hydration status: acceptable  Comments: Ok to discharge when post op criteria met.    Post anesthesia nausea and vomiting:  none  Final Post Anesthesia Temperature Assessment:  Normothermia (36.0-37.5 degrees C)      INITIAL Post-op Vital signs:   Vitals Value Taken Time   /96 03/29/21 1011   Temp 36.2 °C (97.1 °F) 03/29/21 1011   Pulse 70 03/29/21 1011   Resp 14 03/29/21 1011   SpO2 98 % 03/29/21 1011

## 2021-03-29 NOTE — INTERVAL H&P NOTE
Update History & Physical 
 
The Patient's History and Physical was reviewed with the patient. There was no change. The surgical site was confirmed by the patient and me. Plan:  The risk, benefits, expected outcome, and alternative to the recommended procedure have been discussed with the patient. Patient understands and wants to proceed with the procedure.  
 
Electronically signed by Jayesh Herrera MD on 3/29/2021 at 8:05 AM

## 2021-03-29 NOTE — OP NOTES
Operative Note    Patient: Britton Mejia MRN: [id number]  Surgery Date: 3/29/2021  [unfilled]          Procedure  Primary Surgeon    LEFT CARPAL TUNNEL RELEASE, ULNAR NERVE ENTRAPMENT  Codi Adkins MD    * Panel 2 does not exist *  * Panel 2 does not exist *    * Panel 3 does not exist *  * Panel 3 does not exist *     Surgeon(s) and Role:     * Codi Adkins MD - Primary    Other OR Staff/Assistants:  Circ-1: Kathie Abdi  Scrub Tech-1: Rosana Slavirginia  Surg Asst-1: John Delgadillo    1st Assistant Tasks:  Closing    Pre-operative Diagnosis:  Carpal tunnel syndrome on left [G56.02]  Lesion of left ulnar nerve [G56.22]    Post-operative Diagnosis: same as preop diagnosis    Anesthesia Type: MAC     Findings: Ulnar entrapment    Complications: No    EBL: Minimal    Specimens: None        Operative time-out was performed. Subsequently, the extremity was prepped and draped in a sterile fashion. After adequate anesthesia was given, longitudinal incision over the volar aspect of the MCP joint over the A1 pulley was made approximately 1 cm in length. Blunt dissection performed down to the A1 pulley. At that point, a small rent was made. Proximal and distal release performed using small scissors. Adequate release was assessed using a [place]. There was no evidence of any other pathology. Copious irrigation was performed. Skin was closed with 3-0 nylon. Compressive dressing was applied. The patient was taken to PACU in stable condition. A timeout was performed. The correct extremity was prepped and draped in sterile fashion. After adequate anesthesia was given, the patient was well-padded in supine position. Time-out was performed. Longitudinal incision from the volar crease distally approximately 2 cm in length was made. Blunt dissection performed down to transverse carpal ligament. At that point, a small rent was made. Distal release performed using small scissors.   Proximal release was performed using a [employer]omet carpal tunnel knife. Adequate release was assessed using a [place]. Copious irrigation was performed. Skin was closed with 3-0 nylon. Compressive dressing was applied. The patient was taken to PACU in stable condition.

## 2021-03-29 NOTE — ANESTHESIA PREPROCEDURE EVALUATION
Relevant Problems   No relevant active problems       Anesthetic History   No history of anesthetic complications            Review of Systems / Medical History  Patient summary reviewed, nursing notes reviewed and pertinent labs reviewed    Pulmonary  Within defined limits                 Neuro/Psych   Within defined limits           Cardiovascular  Within defined limits                Exercise tolerance: >4 METS     GI/Hepatic/Renal  Within defined limits              Endo/Other  Within defined limits           Other Findings              Physical Exam    Airway  Mallampati: II  TM Distance: 4 - 6 cm  Neck ROM: normal range of motion   Mouth opening: Normal     Cardiovascular  Regular rate and rhythm,  S1 and S2 normal,  no murmur, click, rub, or gallop  Rhythm: regular  Rate: normal         Dental  No notable dental hx       Pulmonary  Breath sounds clear to auscultation               Abdominal  GI exam deferred       Other Findings            Anesthetic Plan    ASA: 2  Anesthesia type: MAC          Induction: Intravenous  Anesthetic plan and risks discussed with: Patient

## 2021-03-29 NOTE — OP NOTES
Prior to transferring patient to stretcher from OR bed, patient was noted to have a saturated dressing around left wrist. Dressing was removed and active bleeding was discovered. Dr. Chico Wood was notified of this discovery and pressure was held at sight. Tourniquet was re applied and set to 250 per Dr. Chico Wood, arm was re draped and prepped with betadine. Dr. Chico Wood to bedside, bleeding controlled and area sutured and dressed. Patient stable, dressing dry and intact

## 2021-03-29 NOTE — DISCHARGE INSTRUCTIONS
Your first meal home should be clear liquids     Start antibiotics day after surgery if you are prescribed antibiotics unless otherwise instructed. Start Pain meds, the night you have surgery as needed. No alcohol while on pain meds postop. An Ice bag should be applied to your operative site for at least 20 minutes four times a day. DO NOT USE HEAT-this may cause increased swelling and discomfort. You may move your fingers as tolerated. If you have a brace or a rigid splint on then your dressings will need to remain in place until your follow-up appointment. If you do not have a rigid splint or a brace on then you may remove the dressings 1 week after surgery. If you need to shower please cover the entire dressing with an impervious bag to prevent it from getting wet. If you were not given a sling or a rigid brace you may drive once you feel comfortable. And when not taking any narcotic pain medication. A sling may be provided for your comfort. Wear it at all times. Swelling and discoloration may be present post-operatively. This is normal.    If your job requires little physical activity you may return as you feel able. If your job requires excessive lifting or use of affected extremity, please discuss return to work date with your nurse or physician. If you need refill of pain medication, call the office 2 business days prior to running out of medication. Please call during regular business hours, Medication refill requests will not be addressed during non-business hours. Please do not page the on-call provider for pain medication refills after hours.       Things to watch for:             Increased swelling of the surgical site             Spreading of redness around the incision site             Drainage of pus from the incision site             Developing a fever of 101.5 °F or higher             If any of these symptoms occur you have any questions please contact our office at [phone]. If you need to talk to Dr. Uvaldo Ortega on an urgent basis please call the hospital at [phone]. [phone] for the . Please let the  know you are a surgical patient of Dr. Uvaldo Ortega and you wish to get in contact with him. If Dr. Uvaldo Ortega or his staff do not call you back within 30 minutes. Please tell the  to try again. Phone: [phone]  [email or web address]

## 2021-03-30 LAB
ATRIAL RATE: 77 BPM
CALCULATED P AXIS, ECG09: 90 DEGREES
CALCULATED R AXIS, ECG10: 7 DEGREES
CALCULATED T AXIS, ECG11: 133 DEGREES
DIAGNOSIS, 93000: NORMAL
P-R INTERVAL, ECG05: 178 MS
Q-T INTERVAL, ECG07: 442 MS
QRS DURATION, ECG06: 94 MS
QTC CALCULATION (BEZET), ECG08: 500 MS
VENTRICULAR RATE, ECG03: 77 BPM

## 2021-04-07 ENCOUNTER — OFFICE VISIT (OUTPATIENT)
Dept: ORTHOPEDIC SURGERY | Age: 66
End: 2021-04-07
Payer: MEDICARE

## 2021-04-07 DIAGNOSIS — M25.532 LEFT WRIST PAIN: Primary | ICD-10-CM

## 2021-04-07 DIAGNOSIS — G56.02 CARPAL TUNNEL SYNDROME ON LEFT: Primary | ICD-10-CM

## 2021-04-07 PROCEDURE — 99024 POSTOP FOLLOW-UP VISIT: CPT | Performed by: ORTHOPAEDIC SURGERY

## 2021-04-07 PROCEDURE — L3670 SO ACRO/CLAV CAN WEB PRE OTS: HCPCS | Performed by: ORTHOPAEDIC SURGERY

## 2021-04-07 RX ORDER — HYDRALAZINE HYDROCHLORIDE 50 MG/1
TABLET, FILM COATED ORAL
COMMUNITY
Start: 2021-03-31

## 2021-04-07 NOTE — LETTER
RX/DWO/POD 
DOS: 4/7/2021 Chapo Muniz Sr.          1955                                                              Pepe Lan NPI# [id number] Wrist                     Foot/Ankle                         Knee Wrist Splint            Cam Boot                         Knee Immobilizer Thumb Spica         Lace Up Ankle Strap       J Sleeve Night Time Splint             T- Scope ROM Brace Short Runner OA Brace SHOULDER Sling Sling w/ Abduction Pillow ELBOW Elbow T-Scope ROM Brace Back Back Brace CRUTCHES Left    Right    __________ Size              IDC 10 Code:____________ I hereby acknowledge receipt of the above listed equipment. I acknowledge that the equipment is in good working order. I have received instructions on safe and proper use of the equipment, including cleaning and maintenance requirements, to my complete satisfaction. I also understand that this product is not able to be returned and is non refundable. I hereby request that payment of authorized Medicare/ third party insurance benefits be made on my behalf of [address] [place] [address] for assigned claims for any authorized equipment/product furnished by [facility]. Having read the foregoing terms and conditions of the agreement on this page , I do hereby agree to be bound thereby.  
 
 
_______________________________________         ____________________________ Patient/Legal Guardian Signature         Date            Representative Name

## 2021-04-07 NOTE — PATIENT INSTRUCTIONS
Arm Pain: Care Instructions Your Care Instructions You can hurt your arm by using it too much or by injuring it. Biking, wrestling, and home repair projects are examples of activities that can lead to arm pain. Everyday wear and tear, especially as you get older, can cause arm pain. Your forearms, wrists, hands, and fingers are the parts of your arm that are most likely to become painful. A minor arm injury usually will heal on its own with home treatment to relieve swelling and pain. If you have a more serious injury, you may need tests and treatment. Follow-up care is a key part of your treatment and safety. Be sure to make and go to all appointments, and call your doctor if you are having problems. It's also a good idea to know your test results and keep a list of the medicines you take. How can you care for yourself at home? · Take pain medicines exactly as directed. ? If the doctor gave you a prescription medicine for pain, take it as prescribed. ? If you are not taking a prescription pain medicine, ask your doctor if you can take an over-the-counter medicine. · Rest and protect your arm. Take a break from any activity that may cause pain. · Put ice or a cold pack on your arm for 10 to 20 minutes at a time. Put a thin cloth between the ice and your skin. · Prop up the sore arm on a pillow when you ice it or anytime you sit or lie down during the next 3 days. Try to keep it above the level of your heart. This will help reduce swelling. · If your doctor recommends a sling to support your arm, wear it as directed. When should you call for help? Call 911 anytime you think you may need emergency care. For example, call if: 
  · Your arm or hand is cool or pale or changes color. Call your doctor now or seek immediate medical care if: 
  · You cannot use your arm.  
  · You have signs of infection, such as: 
? Increased pain, swelling, warmth, or redness.  
? Red streaks running up or down your arm. 
? Pus draining from an area of your arm. ? A fever.  
  · You have tingling, weakness, or numbness in your arm. Watch closely for changes in your health, and be sure to contact your doctor if: 
  · You do not get better as expected. Where can you learn more? Go to http://www.boland.com/ Enter B641 in the search box to learn more about \"Arm Pain: Care Instructions. \" Current as of: February 26, 2020               Content Version: 12.8 © 2006-2021 [employer]. Care instructions adapted under license by [employer] (which disclaims liability or warranty for this information). If you have questions about a medical condition or this instruction, always ask your healthcare professional. [address] any warranty or liability for your use of this information.

## 2021-04-07 NOTE — PROGRESS NOTES
Name: Mirian Solis    : 1955     Service Dept: [address] [place] [address] and Sports Medicine    Patient's Pharmacies:    [facility] [address] ALYSON GAMA [address] [facility], [address] [facility]  [address] [address]  Phone: [phone] Fax: [phone]       Chief Complaint   Patient presents with    Surgical Follow-up        There were no vitals taken for this visit. No Known Allergies   Current Outpatient Medications   Medication Sig Dispense Refill    hydrALAZINE (APRESOLINE) 50 mg tablet TAKE 1 TABLET BY MOUTH TWICE DAILY      ondansetron (ZOFRAN ODT) 4 mg disintegrating tablet Take 1 Tab by mouth as needed for Nausea or Vomiting. 10 Tab 0    potassium chloride (K-DUR, KLOR-CON) 20 mEq tablet TAKE 1 BY MOUTH THREE TIMES DAILY      magnesium oxide (MAG-OX) 400 mg tablet TAKE 1 TABLET BY MOUTH TWICE DAILY      olmesartan (BENICAR) 40 mg tablet       rosuvastatin (CRESTOR) 10 mg tablet       predniSONE (DELTASONE) 10 mg tablet       amLODIPine (NORVASC) 10 mg tablet         Patient Active Problem List   Diagnosis Code    Knee osteoarthritis M17.10      No family history on file. Social History     Socioeconomic History    Marital status:      Spouse name: Not on file    Number of children: Not on file    Years of education: Not on file    Highest education level: Not on file   Tobacco Use    Smoking status: Never Smoker    Smokeless tobacco: Never Used   Substance and Sexual Activity    Alcohol use: Not Currently      No past surgical history on file. Past Medical History:   Diagnosis Date    Hypertension         I have reviewed and agree with [facility] [address] and ROS and intake form in chart and the record furthermore I have reviewed prior medical record(s) regarding this patients care during this appointment.      Review of Systems:   Patient is a pleasant appearing individual, appropriately dressed, well hydrated, well nourished, who is alert, appropriately oriented for age, and in no acute distress with a normal gait and normal affect who does not appear to be in any significant pain. Physical Exam:  Right wrist - No point tenderness, Full range of motion, No instability, No Weakness, No, skin lesions, No swelling, Grossly neurovascularly intact. Encounter Diagnoses     ICD-10-CM ICD-9-CM   1. Carpal tunnel syndrome on left  G56.02 354.0       Physical examination shows well-healing incision, good capillary refill, grossly neurovascularly intact, and no instability. HPI:  The patient is here with a chief complaint of left upper extremity numbness and tingling, status post left carpal tunnel release, and ulnar nerve transposition. Doing well. Has no complaints. Assessment/Plan:  Plan at this point, we will see him back in 2 weeks, take the stitches out at that point, continue with sling in the meantime, and go from there. If the patient gets worse, he is to give me a call. As part of continued conservative pain management options the patient was advised to utilize Tylenol or OTC NSAIDS as long as it is not medically contraindicated. Return to Office: Follow-up and Dispositions    · Return in about 2 weeks (around 4/21/2021). Scribed by Kenny Nichols LPN as dictated by [facility] MARIBEL Gilliam MD.  Documentation True and Accepted Manuel Gilliam MD

## 2021-04-21 ENCOUNTER — OFFICE VISIT (OUTPATIENT)
Dept: ORTHOPEDIC SURGERY | Age: 66
End: 2021-04-21
Payer: MEDICARE

## 2021-04-21 DIAGNOSIS — G56.02 CARPAL TUNNEL SYNDROME ON LEFT: Primary | ICD-10-CM

## 2021-04-21 PROCEDURE — 99024 POSTOP FOLLOW-UP VISIT: CPT | Performed by: ORTHOPAEDIC SURGERY

## 2021-04-21 NOTE — PROGRESS NOTES
Name: Reji Cerna    : 1955     Service Dept: [address] [place] [address] and Sports Medicine    Patient's Pharmacies:    [address] [facility], [address] [address]  Phone: [phone] Fax: [phone]       Chief Complaint   Patient presents with    Surgical Follow-up        There were no vitals taken for this visit. No Known Allergies   Current Outpatient Medications   Medication Sig Dispense Refill    hydrALAZINE (APRESOLINE) 50 mg tablet TAKE 1 TABLET BY MOUTH TWICE DAILY      ondansetron (ZOFRAN ODT) 4 mg disintegrating tablet Take 1 Tab by mouth as needed for Nausea or Vomiting. 10 Tab 0    potassium chloride (K-DUR, KLOR-CON) 20 mEq tablet TAKE 1 BY MOUTH THREE TIMES DAILY      magnesium oxide (MAG-OX) 400 mg tablet TAKE 1 TABLET BY MOUTH TWICE DAILY      olmesartan (BENICAR) 40 mg tablet       rosuvastatin (CRESTOR) 10 mg tablet       predniSONE (DELTASONE) 10 mg tablet       amLODIPine (NORVASC) 10 mg tablet         Patient Active Problem List   Diagnosis Code    Knee osteoarthritis M17.10      History reviewed. No pertinent family history. Social History     Socioeconomic History    Marital status:      Spouse name: Not on file    Number of children: Not on file    Years of education: Not on file    Highest education level: Not on file   Tobacco Use    Smoking status: Never Smoker    Smokeless tobacco: Never Used   Substance and Sexual Activity    Alcohol use: Not Currently      History reviewed. No pertinent surgical history. Past Medical History:   Diagnosis Date    Hypertension         I have reviewed and agree with [address] [facility] [address] and ROS and intake form in chart and the record furthermore I have reviewed prior medical record(s) regarding this patients care during this appointment.      Review of Systems:   Patient is a pleasant appearing individual, appropriately dressed, well hydrated, well nourished, who is alert, appropriately oriented for age, and in no acute distress with a normal gait and normal affect who does not appear to be in any significant pain. Physical Exam:  Right wrist - No point tenderness, Full range of motion, No instability, No Weakness, No, skin lesions, No swelling, Grossly neurovascularly intact. Encounter Diagnoses     ICD-10-CM ICD-9-CM   1. Carpal tunnel syndrome on left  G56.02 354.0       HPI:  The patient is here status post left carpal tunnel release. Doing well. Has no complaints. Little bit of soreness. Assessment/Plan:  Plan at this point, ice, elevate, antiinflammatories, activities as tolerated, weightbearing started, and no restrictions. We will see the patient back as needed. We will take the stitches out today and go from there. As part of continued conservative pain management options the patient was advised to utilize Tylenol or OTC NSAIDS as long as it is not medically contraindicated. Return to Office: Follow-up and Dispositions    · Return if symptoms worsen or fail to improve. Scribed by Shira Newman LPN as dictated by [facility] MARIBEL Vargas MD.  Documentation True and Accepted Manuel Vargas MD

## 2021-04-21 NOTE — PATIENT INSTRUCTIONS
Arm Pain: Care Instructions Your Care Instructions You can hurt your arm by using it too much or by injuring it. Biking, wrestling, and home repair projects are examples of activities that can lead to arm pain. Everyday wear and tear, especially as you get older, can cause arm pain. Your forearms, wrists, hands, and fingers are the parts of your arm that are most likely to become painful. A minor arm injury usually will heal on its own with home treatment to relieve swelling and pain. If you have a more serious injury, you may need tests and treatment. Follow-up care is a key part of your treatment and safety. Be sure to make and go to all appointments, and call your doctor if you are having problems. It's also a good idea to know your test results and keep a list of the medicines you take. How can you care for yourself at home? · Take pain medicines exactly as directed. ? If the doctor gave you a prescription medicine for pain, take it as prescribed. ? If you are not taking a prescription pain medicine, ask your doctor if you can take an over-the-counter medicine. · Rest and protect your arm. Take a break from any activity that may cause pain. · Put ice or a cold pack on your arm for 10 to 20 minutes at a time. Put a thin cloth between the ice and your skin. · Prop up the sore arm on a pillow when you ice it or anytime you sit or lie down during the next 3 days. Try to keep it above the level of your heart. This will help reduce swelling. · If your doctor recommends a sling to support your arm, wear it as directed. When should you call for help? Call 911 anytime you think you may need emergency care. For example, call if: 
  · Your arm or hand is cool or pale or changes color. Call your doctor now or seek immediate medical care if: 
  · You cannot use your arm.  
  · You have signs of infection, such as: 
? Increased pain, swelling, warmth, or redness.  
? Red streaks running up or down your arm. 
? Pus draining from an area of your arm. ? A fever.  
  · You have tingling, weakness, or numbness in your arm. Watch closely for changes in your health, and be sure to contact your doctor if: 
  · You do not get better as expected. Where can you learn more? Go to http://www.boland.com/ Enter B641 in the search box to learn more about \"Arm Pain: Care Instructions. \" Current as of: February 26, 2020               Content Version: 12.8 © 2006-2021 [employer]. Care instructions adapted under license by [employer] (which disclaims liability or warranty for this information). If you have questions about a medical condition or this instruction, always ask your healthcare professional. [address] any warranty or liability for your use of this information.

## 2022-03-19 PROBLEM — M17.9 KNEE OSTEOARTHRITIS: Status: ACTIVE | Noted: 2021-03-29

## 2023-04-25 ENCOUNTER — OFFICE VISIT (OUTPATIENT)
Dept: GASTROENTEROLOGY | Age: 68
End: 2023-04-25
Payer: MEDICARE

## 2023-04-25 VITALS
TEMPERATURE: 97.7 F | HEIGHT: 71 IN | SYSTOLIC BLOOD PRESSURE: 186 MMHG | HEART RATE: 104 BPM | DIASTOLIC BLOOD PRESSURE: 88 MMHG | WEIGHT: 217.5 LBS | OXYGEN SATURATION: 97 % | BODY MASS INDEX: 30.45 KG/M2 | RESPIRATION RATE: 20 BRPM

## 2023-04-25 DIAGNOSIS — I10 PRIMARY HYPERTENSION: ICD-10-CM

## 2023-04-25 DIAGNOSIS — M35.3 POLYMYALGIA RHEUMATICA (HCC): ICD-10-CM

## 2023-04-25 DIAGNOSIS — R73.9 HYPERGLYCEMIA, UNSPECIFIED: ICD-10-CM

## 2023-04-25 DIAGNOSIS — E87.6 HYPOKALEMIA: ICD-10-CM

## 2023-04-25 DIAGNOSIS — R19.5 POSITIVE COLORECTAL CANCER SCREENING USING COLOGUARD TEST: Primary | ICD-10-CM

## 2023-04-25 DIAGNOSIS — M77.9 ENTHESOPATHY: ICD-10-CM

## 2023-04-25 DIAGNOSIS — E78.00 PURE HYPERCHOLESTEROLEMIA: ICD-10-CM

## 2023-04-25 PROCEDURE — G8417 CALC BMI ABV UP PARAM F/U: HCPCS | Performed by: INTERNAL MEDICINE

## 2023-04-25 PROCEDURE — 1101F PT FALLS ASSESS-DOCD LE1/YR: CPT | Performed by: INTERNAL MEDICINE

## 2023-04-25 PROCEDURE — G8536 NO DOC ELDER MAL SCRN: HCPCS | Performed by: INTERNAL MEDICINE

## 2023-04-25 PROCEDURE — 99204 OFFICE O/P NEW MOD 45 MIN: CPT | Performed by: INTERNAL MEDICINE

## 2023-04-25 PROCEDURE — 3074F SYST BP LT 130 MM HG: CPT | Performed by: INTERNAL MEDICINE

## 2023-04-25 PROCEDURE — 3017F COLORECTAL CA SCREEN DOC REV: CPT | Performed by: INTERNAL MEDICINE

## 2023-04-25 PROCEDURE — G8427 DOCREV CUR MEDS BY ELIG CLIN: HCPCS | Performed by: INTERNAL MEDICINE

## 2023-04-25 PROCEDURE — 1123F ACP DISCUSS/DSCN MKR DOCD: CPT | Performed by: INTERNAL MEDICINE

## 2023-04-25 PROCEDURE — G8510 SCR DEP NEG, NO PLAN REQD: HCPCS | Performed by: INTERNAL MEDICINE

## 2023-04-25 PROCEDURE — 3078F DIAST BP <80 MM HG: CPT | Performed by: INTERNAL MEDICINE

## 2023-04-25 RX ORDER — HYDRALAZINE HYDROCHLORIDE 100 MG/1
100 TABLET, FILM COATED ORAL 2 TIMES DAILY
COMMUNITY

## 2023-04-25 RX ORDER — POLYETHYLENE GLYCOL 3350 17 G/17G
POWDER, FOR SOLUTION ORAL
Qty: 510 G | Refills: 0 | Status: SHIPPED | OUTPATIENT
Start: 2023-04-25

## 2023-04-25 NOTE — PROGRESS NOTES
Chief Complaint   Patient presents with    Referral / Consult     Sent per Dr. Leopold Navy for positive Cologuard test.  States he doesn't look at his BM's to see if any blood present. 1. Have you been to the ER, urgent care clinic since your last visit? Hospitalized since your last visit? No    2. Have you seen or consulted any other health care providers outside of the [address] [place] [address] since your last visit? Include any pap smears or colon screening.  No

## 2023-05-08 NOTE — H&P (VIEW-ONLY)
Aster Jordan is a 75 y.o. male who presents today for the following:  Chief Complaint   Patient presents with    Referral / Consult     Sent per Dr. Lucia Noriega for positive Cologuard test.  States he doesn't look at his BM's to see if any blood present. No Known Allergies    Current Outpatient Medications   Medication Sig    hydrALAZINE (APRESOLINE) 100 mg tablet Take 1 Tablet by mouth two (2) times a day. polyethylene glycol (MIRALAX) 17 gram/dose powder Use as directed  Indications: emptying of the bowel    ondansetron (ZOFRAN ODT) 4 mg disintegrating tablet Take 1 Tab by mouth as needed for Nausea or Vomiting. potassium chloride (K-DUR, KLOR-CON) 20 mEq tablet 1 Tablet two (2) times a day. magnesium oxide (MAG-OX) 400 mg tablet TAKE 1 TABLET BY MOUTH TWICE DAILY    olmesartan (BENICAR) 40 mg tablet Take 1 Tablet by mouth daily. rosuvastatin (CRESTOR) 10 mg tablet Take 1 Tablet by mouth nightly. amLODIPine (NORVASC) 10 mg tablet Take 1 Tablet by mouth daily. hydrALAZINE (APRESOLINE) 50 mg tablet TAKE 1 TABLET BY MOUTH TWICE DAILY (Patient not taking: Reported on 4/25/2023)    predniSONE (DELTASONE) 10 mg tablet  (Patient not taking: Reported on 4/25/2023)     No current facility-administered medications for this visit. Past Medical History:   Diagnosis Date    Hypertension     Positive colorectal cancer screening using Cologuard test        Past Surgical History:   Procedure Laterality Date    HX CARPAL TUNNEL RELEASE Left     HX OTHER SURGICAL      Surgery on Upper Arm- Humerus.        Family History   Problem Relation Age of Onset    Hypertension Mother     Hypertension Father     Stroke Paternal Uncle        Social History     Socioeconomic History    Marital status:      Spouse name: Not on file    Number of children: Not on file    Years of education: Not on file    Highest education level: Not on file   Occupational History    Not on file   Tobacco Use    Smoking

## 2023-05-11 ENCOUNTER — ANESTHESIA EVENT (OUTPATIENT)
Facility: HOSPITAL | Age: 68
End: 2023-05-11
Payer: MEDICARE

## 2023-05-11 ENCOUNTER — HOSPITAL ENCOUNTER (OUTPATIENT)
Facility: HOSPITAL | Age: 68
Setting detail: OUTPATIENT SURGERY
Discharge: HOME | End: 2023-05-11
Attending: INTERNAL MEDICINE | Admitting: INTERNAL MEDICINE
Payer: MEDICARE

## 2023-05-11 ENCOUNTER — ANESTHESIA (OUTPATIENT)
Facility: HOSPITAL | Age: 68
End: 2023-05-11
Payer: MEDICARE

## 2023-05-11 VITALS
WEIGHT: 215 LBS | SYSTOLIC BLOOD PRESSURE: 137 MMHG | TEMPERATURE: 97.5 F | HEART RATE: 80 BPM | HEIGHT: 71 IN | OXYGEN SATURATION: 97 % | BODY MASS INDEX: 30.1 KG/M2 | RESPIRATION RATE: 18 BRPM | DIASTOLIC BLOOD PRESSURE: 96 MMHG

## 2023-05-11 LAB
EKG ATRIAL RATE: 91 BPM
EKG DIAGNOSIS: NORMAL
EKG P-R INTERVAL: 152 MS
EKG Q-T INTERVAL: 430 MS
EKG QRS DURATION: 98 MS
EKG QTC CALCULATION (BAZETT): 506 MS
EKG R AXIS: -2 DEGREES
EKG VENTRICULAR RATE: 83 BPM

## 2023-05-11 PROCEDURE — 3600007511: Performed by: INTERNAL MEDICINE

## 2023-05-11 PROCEDURE — 3600007501: Performed by: INTERNAL MEDICINE

## 2023-05-11 PROCEDURE — 3700000001 HC ADD 15 MINUTES (ANESTHESIA): Performed by: INTERNAL MEDICINE

## 2023-05-11 PROCEDURE — 7100000010 HC PHASE II RECOVERY - FIRST 15 MIN: Performed by: INTERNAL MEDICINE

## 2023-05-11 PROCEDURE — 7100000011 HC PHASE II RECOVERY - ADDTL 15 MIN: Performed by: INTERNAL MEDICINE

## 2023-05-11 PROCEDURE — 3700000000 HC ANESTHESIA ATTENDED CARE: Performed by: INTERNAL MEDICINE

## 2023-05-11 PROCEDURE — 2580000003 HC RX 258: Performed by: INTERNAL MEDICINE

## 2023-05-11 PROCEDURE — 2500000003 HC RX 250 WO HCPCS: Performed by: NURSE ANESTHETIST, CERTIFIED REGISTERED

## 2023-05-11 PROCEDURE — 93005 ELECTROCARDIOGRAM TRACING: CPT | Performed by: NURSE ANESTHETIST, CERTIFIED REGISTERED

## 2023-05-11 PROCEDURE — 2709999900 HC NON-CHARGEABLE SUPPLY: Performed by: INTERNAL MEDICINE

## 2023-05-11 RX ORDER — ASPIRIN 325 MG
325 TABLET ORAL DAILY
COMMUNITY

## 2023-05-11 RX ORDER — SODIUM CHLORIDE 9 MG/ML
25 INJECTION, SOLUTION INTRAVENOUS PRN
Status: DISCONTINUED | OUTPATIENT
Start: 2023-05-11 | End: 2023-05-11 | Stop reason: HOSPADM

## 2023-05-11 RX ORDER — LIDOCAINE HYDROCHLORIDE 20 MG/ML
INJECTION, SOLUTION EPIDURAL; INFILTRATION; INTRACAUDAL; PERINEURAL PRN
Status: DISCONTINUED | OUTPATIENT
Start: 2023-05-11 | End: 2023-05-11 | Stop reason: SDUPTHER

## 2023-05-11 RX ADMIN — LIDOCAINE HYDROCHLORIDE 50 MG: 20 INJECTION, SOLUTION EPIDURAL; INFILTRATION; INTRACAUDAL; PERINEURAL at 12:19

## 2023-05-11 NOTE — OP NOTE
Colonoscopy Procedure Note      Patient: Luis Watson. MRN: [id number]  SSN: [id number]    YOB: 1955  Age: 75 y.o. Sex: male      Date of Procedure: 5/11/2023  Date/Time:  5/11/2023 1:30 PM       IMPRESSION:     1. Ascending colon polyps   2. Hepatic flexure polyps              3.  Transverse colon polyp              4.  Descending colon polyp              5.  Rectal polyps              6.  Left-sided diverticulosis         RECOMMENDATIONS:     1) Check biopsy results. 2) Await pathology report. Call me in 2 weeks if you have not received any information from my office regarding your results. 3) Repeat colonoscopy in 2 to 3 years or as determined by the pathology report. INDICATION: Positive Cologuard test    PROCEDURE PERFORMED: Colonoscopy with hot snare polypectomy  Colonoscopy with cold biopsies     DESCRIPTION OF PROCEDURE: An informed consent was obtained. The patient was placed in left lateral position. Perianal inspection and a digital rectal exam was performed. Video colonoscope was introduced into the rectum and advanced under direct vision up to the terminal ileum. With adequate insufflation and maneuvering of the withdrawing scope, the colonic mucosa was visualized carefully. Retroflexion was performed in the rectum to see the anorectum and also in the ascending colon to look behind the folds. Vital signs, pulse oximetry, single lead cardiac monitor were monitored throughout the procedure as the sedation was titrated to the desired effect ensuring patient comfort and safety. The patient tolerated the procedure very well and was transferred to the recovery area. Following is the summary of findings: In the ascending colon we saw 3 polyps measuring 1.2 cm, 1 cm, and 0.8 cm in size. All removed via hot snare polypectomy. At the hepatic flexure we saw 2 polyps measured 0.6 and 0.4 cm which were removed via hot snare polypectomy.   In the transverse colon we saw

## 2023-05-11 NOTE — DISCHARGE INSTRUCTIONS

## 2023-05-11 NOTE — INTERVAL H&P NOTE
Update History & Physical    The patient's History and Physical of May 11, 2023 was reviewed with the patient and I examined the patient. There was no change. The surgical site was confirmed by the patient and me. Plan: The risks, benefits, expected outcome, and alternative to the recommended procedure have been discussed with the patient. Patient understands and wants to proceed with the procedure.      Electronically signed by Madalyn Lee MD on 5/11/2023 at 10:28 AM

## 2023-05-11 NOTE — ANESTHESIA POSTPROCEDURE EVALUATION
Department of Anesthesiology  Postprocedure Note    Patient: Aubrey Khalil MRN: [id number]  YOB: 1955  Date of evaluation: 5/11/2023      Procedure Summary     Date: 05/11/23 Room / Location: [facility] ENDO 01 / [facility] ENDOSCOPY    Anesthesia Start: 1154 Anesthesia Stop: 2439    Procedure: COLONOSCOPY DIAGNOSTIC (Anus) Diagnosis: Positive occult stool blood test    Surgeons:  Sil Barnett MD Responsible Provider: STUART Mcclendon CRNA    Anesthesia Type: MAC ASA Status: 2          Anesthesia Type: MAC    Lamont Phase I: Lamont Score: 10    Lamont Phase II:        Anesthesia Post Evaluation    Patient location during evaluation: bedside  Patient participation: complete - patient cannot participate  Level of consciousness: awake and alert  Pain score: 0  Airway patency: patent  Nausea & Vomiting: no nausea  Complications: no  Cardiovascular status: blood pressure returned to baseline  Respiratory status: acceptable  Hydration status: stable

## 2023-05-11 NOTE — ANESTHESIA PRE PROCEDURE
Department of Anesthesiology  Preprocedure Note       Name:  Dahiana Mendez.   Age:  75 y.o.  :  1955                                          MRN:  [id number]         Date:  2023      Surgeon: Renny John):  Kashif Tineo., MD    Procedure: Procedure(s):  COLONOSCOPY DIAGNOSTIC    Medications prior to admission:   Prior to Admission medications    Medication Sig Start Date End Date Taking?  Authorizing Provider   aspirin 325 MG tablet Take 1 tablet by mouth daily   Yes Historical Provider, MD   amLODIPine (NORVASC) 10 MG tablet ceived the following from [facility]: Outside name: amLODIPine (NORVASC) 10 mg tablet 21   Ar Automatic Reconciliation   hydrALAZINE (APRESOLINE) 50 MG tablet 2 tablets 3/31/21   Ar Automatic Reconciliation   magnesium oxide (MAG-OX) 400 MG tablet TAKE 1 TABLET BY MOUTH TWICE DAILY 20   Ar Automatic Reconciliation   olmesartan (BENICAR) 40 MG tablet ceived the following from [facility]: Outside name: olmesartan (BENICAR) 40 mg tablet 21   Ar Automatic Reconciliation   ondansetron (ZOFRAN-ODT) 4 MG disintegrating tablet Take 1 tablet by mouth as needed 3/22/21   Ar Automatic Reconciliation   potassium chloride (KLOR-CON M) 20 MEQ extended release tablet TAKE 1 BY MOUTH THREE TIMES DAILY 20   Ar Automatic Reconciliation   predniSONE (DELTASONE) 10 MG tablet ceived the following from [facility]: Outside name: predniSONE (DELTASONE) 10 mg tablet 21   Ar Automatic Reconciliation   rosuvastatin (CRESTOR) 10 MG tablet ceived the following from [facility]: Outside name: rosuvastatin (CRESTOR) 10 mg tablet 21   Ar Automatic Reconciliation       Current medications:    Current Facility-Administered Medications   Medication Dose Route Frequency Provider Last Rate Last Admin    0.9 % sodium chloride infusion  25 mL IntraVENous PRN Lalit Dominguez  mL/hr at 23 1037

## 2023-05-11 NOTE — PROGRESS NOTES
Patient receiving EKG at this time, due to showing  Afib on monitor during endo procedure per JUAN C Rojo.

## 2024-01-26 PROCEDURE — 96365 THER/PROPH/DIAG IV INF INIT: CPT

## 2024-01-26 PROCEDURE — 96376 TX/PRO/DX INJ SAME DRUG ADON: CPT

## 2024-01-26 PROCEDURE — 96366 THER/PROPH/DIAG IV INF ADDON: CPT

## 2024-01-26 PROCEDURE — 99285 EMERGENCY DEPT VISIT HI MDM: CPT

## 2024-01-26 PROCEDURE — 96361 HYDRATE IV INFUSION ADD-ON: CPT

## 2024-01-26 PROCEDURE — 96375 TX/PRO/DX INJ NEW DRUG ADDON: CPT

## 2024-01-27 ENCOUNTER — HOSPITAL ENCOUNTER (INPATIENT)
Facility: HOSPITAL | Age: 69
LOS: 16 days | Discharge: HOME | DRG: 335 | End: 2024-02-12
Attending: INTERNAL MEDICINE | Admitting: INTERNAL MEDICINE
Payer: MEDICARE

## 2024-01-27 ENCOUNTER — HOSPITAL ENCOUNTER (EMERGENCY)
Facility: HOSPITAL | Age: 69
Discharge: ANOTHER HOSPITAL | End: 2024-01-27
Payer: MEDICARE

## 2024-01-27 ENCOUNTER — APPOINTMENT (OUTPATIENT)
Facility: HOSPITAL | Age: 69
End: 2024-01-27
Payer: MEDICARE

## 2024-01-27 VITALS
BODY MASS INDEX: 28.35 KG/M2 | WEIGHT: 202.5 LBS | HEIGHT: 71 IN | OXYGEN SATURATION: 97 % | DIASTOLIC BLOOD PRESSURE: 73 MMHG | SYSTOLIC BLOOD PRESSURE: 112 MMHG | HEART RATE: 88 BPM | RESPIRATION RATE: 19 BRPM | TEMPERATURE: 98.3 F

## 2024-01-27 DIAGNOSIS — N17.9 ACUTE KIDNEY INJURY (HCC): ICD-10-CM

## 2024-01-27 DIAGNOSIS — K56.609 SMALL BOWEL OBSTRUCTION (HCC): Primary | ICD-10-CM

## 2024-01-27 DIAGNOSIS — E86.0 DEHYDRATION: ICD-10-CM

## 2024-01-27 DIAGNOSIS — E87.6 HYPOKALEMIA: ICD-10-CM

## 2024-01-27 DIAGNOSIS — K56.609 SMALL BOWEL OBSTRUCTION (HCC): ICD-10-CM

## 2024-01-27 DIAGNOSIS — I48.91 ATRIAL FIBRILLATION, UNSPECIFIED TYPE (HCC): Primary | ICD-10-CM

## 2024-01-27 LAB
ALBUMIN SERPL-MCNC: 3.5 G/DL (ref 3.5–5)
ALBUMIN/GLOB SERPL: 0.7 (ref 1.1–2.2)
ALP SERPL-CCNC: 119 U/L (ref 45–117)
ALT SERPL-CCNC: 14 U/L (ref 12–78)
ANION GAP SERPL CALC-SCNC: 11 MMOL/L (ref 5–15)
ANION GAP SERPL CALC-SCNC: 12 MMOL/L (ref 5–15)
ANION GAP SERPL CALC-SCNC: 8 MMOL/L (ref 5–15)
AST SERPL W P-5'-P-CCNC: 14 U/L (ref 15–37)
BASOPHILS # BLD: 0 K/UL (ref 0–0.1)
BASOPHILS NFR BLD: 0 % (ref 0–1)
BILIRUB SERPL-MCNC: 0.7 MG/DL (ref 0.2–1)
BUN SERPL-MCNC: 37 MG/DL (ref 6–20)
BUN SERPL-MCNC: 47 MG/DL (ref 6–20)
BUN SERPL-MCNC: 50 MG/DL (ref 6–20)
BUN/CREAT SERPL: 6 (ref 12–20)
BUN/CREAT SERPL: 7 (ref 12–20)
BUN/CREAT SERPL: 8 (ref 12–20)
C COLI+JEJUNI TUF STL QL NAA+PROBE: NEGATIVE
CA-I BLD-MCNC: 9.2 MG/DL (ref 8.5–10.1)
CALCIUM SERPL-MCNC: 8 MG/DL (ref 8.5–10.1)
CALCIUM SERPL-MCNC: 8.6 MG/DL (ref 8.5–10.1)
CHLORIDE SERPL-SCNC: 89 MMOL/L (ref 97–108)
CHLORIDE SERPL-SCNC: 91 MMOL/L (ref 97–108)
CHLORIDE SERPL-SCNC: 98 MMOL/L (ref 97–108)
CO2 SERPL-SCNC: 33 MMOL/L (ref 21–32)
CO2 SERPL-SCNC: 37 MMOL/L (ref 21–32)
CO2 SERPL-SCNC: 39 MMOL/L (ref 21–32)
CREAT SERPL-MCNC: 5.78 MG/DL (ref 0.7–1.3)
CREAT SERPL-MCNC: 6.49 MG/DL (ref 0.7–1.3)
CREAT SERPL-MCNC: 6.63 MG/DL (ref 0.7–1.3)
DIFFERENTIAL METHOD BLD: ABNORMAL
EC STX1+STX2 GENES STL QL NAA+PROBE: NEGATIVE
EOSINOPHIL # BLD: 0 K/UL (ref 0–0.4)
EOSINOPHIL NFR BLD: 0 % (ref 0–7)
ERYTHROCYTE [DISTWIDTH] IN BLOOD BY AUTOMATED COUNT: 14.3 % (ref 11.5–14.5)
ETEC ELTA+ESTB GENES STL QL NAA+PROBE: NEGATIVE
GLOBULIN SER CALC-MCNC: 5.1 G/DL (ref 2–4)
GLUCOSE BLD STRIP.AUTO-MCNC: 105 MG/DL (ref 65–117)
GLUCOSE BLD STRIP.AUTO-MCNC: 95 MG/DL (ref 65–117)
GLUCOSE SERPL-MCNC: 109 MG/DL (ref 65–100)
GLUCOSE SERPL-MCNC: 127 MG/DL (ref 65–100)
GLUCOSE SERPL-MCNC: 78 MG/DL (ref 65–100)
HCT VFR BLD AUTO: 47 % (ref 36.6–50.3)
HGB BLD-MCNC: 15.6 G/DL (ref 12.1–17)
IMM GRANULOCYTES # BLD AUTO: 0 K/UL (ref 0–0.04)
IMM GRANULOCYTES NFR BLD AUTO: 0 % (ref 0–0.5)
LACTATE SERPL-SCNC: 1.5 MMOL/L (ref 0.4–2)
LIPASE SERPL-CCNC: 74 U/L (ref 13–75)
LYMPHOCYTES # BLD: 0.9 K/UL (ref 0.8–3.5)
LYMPHOCYTES NFR BLD: 10 % (ref 12–49)
MAGNESIUM SERPL-MCNC: 2.9 MG/DL (ref 1.6–2.4)
MCH RBC QN AUTO: 29.3 PG (ref 26–34)
MCHC RBC AUTO-ENTMCNC: 33.2 G/DL (ref 30–36.5)
MCV RBC AUTO: 88.3 FL (ref 80–99)
MONOCYTES # BLD: 1.3 K/UL (ref 0–1)
MONOCYTES NFR BLD: 14 % (ref 5–13)
NEUTS SEG # BLD: 6.9 K/UL (ref 1.8–8)
NEUTS SEG NFR BLD: 76 % (ref 32–75)
NRBC # BLD: 0 K/UL (ref 0–0.01)
NRBC BLD-RTO: 0 PER 100 WBC
OSMOLALITY SERPL: 306 MOSM/KG H2O
P SHIGELLOIDES DNA STL QL NAA+PROBE: NEGATIVE
PHOSPHATE SERPL-MCNC: 8.6 MG/DL (ref 2.6–4.7)
PLATELET # BLD AUTO: 232 K/UL (ref 150–400)
PMV BLD AUTO: 9.4 FL (ref 8.9–12.9)
POTASSIUM SERPL-SCNC: 2.7 MMOL/L (ref 3.5–5.1)
POTASSIUM SERPL-SCNC: 2.7 MMOL/L (ref 3.5–5.1)
POTASSIUM SERPL-SCNC: 3.1 MMOL/L (ref 3.5–5.1)
POTASSIUM SERPL-SCNC: 3.2 MMOL/L (ref 3.5–5.1)
PROT SERPL-MCNC: 8.6 G/DL (ref 6.4–8.2)
RBC # BLD AUTO: 5.32 M/UL (ref 4.1–5.7)
SALMONELLA SP SPAO STL QL NAA+PROBE: NEGATIVE
SERVICE CMNT-IMP: NORMAL
SERVICE CMNT-IMP: NORMAL
SHIGELLA SP+EIEC IPAH STL QL NAA+PROBE: NEGATIVE
SODIUM SERPL-SCNC: 136 MMOL/L (ref 136–145)
SODIUM SERPL-SCNC: 140 MMOL/L (ref 136–145)
SODIUM SERPL-SCNC: 142 MMOL/L (ref 136–145)
TSH SERPL DL<=0.05 MIU/L-ACNC: 1.73 UIU/ML (ref 0.36–3.74)
V CHOL+PARA+VUL DNA STL QL NAA+NON-PROBE: NEGATIVE
WBC # BLD AUTO: 9.1 K/UL (ref 4.1–11.1)
Y ENTEROCOL DNA STL QL NAA+NON-PROBE: NEGATIVE

## 2024-01-27 PROCEDURE — 99223 1ST HOSP IP/OBS HIGH 75: CPT | Performed by: INTERNAL MEDICINE

## 2024-01-27 PROCEDURE — 94761 N-INVAS EAR/PLS OXIMETRY MLT: CPT

## 2024-01-27 PROCEDURE — 84132 ASSAY OF SERUM POTASSIUM: CPT

## 2024-01-27 PROCEDURE — 2580000003 HC RX 258: Performed by: INTERNAL MEDICINE

## 2024-01-27 PROCEDURE — 6370000000 HC RX 637 (ALT 250 FOR IP): Performed by: EMERGENCY MEDICINE

## 2024-01-27 PROCEDURE — 83690 ASSAY OF LIPASE: CPT

## 2024-01-27 PROCEDURE — 84100 ASSAY OF PHOSPHORUS: CPT

## 2024-01-27 PROCEDURE — 87324 CLOSTRIDIUM AG IA: CPT

## 2024-01-27 PROCEDURE — 6360000002 HC RX W HCPCS: Performed by: EMERGENCY MEDICINE

## 2024-01-27 PROCEDURE — 87177 OVA AND PARASITES SMEARS: CPT

## 2024-01-27 PROCEDURE — 80053 COMPREHEN METABOLIC PANEL: CPT

## 2024-01-27 PROCEDURE — 6360000002 HC RX W HCPCS: Performed by: INTERNAL MEDICINE

## 2024-01-27 PROCEDURE — 51798 US URINE CAPACITY MEASURE: CPT

## 2024-01-27 PROCEDURE — 96375 TX/PRO/DX INJ NEW DRUG ADDON: CPT

## 2024-01-27 PROCEDURE — 74176 CT ABD & PELVIS W/O CONTRAST: CPT

## 2024-01-27 PROCEDURE — 82962 GLUCOSE BLOOD TEST: CPT

## 2024-01-27 PROCEDURE — 93005 ELECTROCARDIOGRAM TRACING: CPT | Performed by: INTERNAL MEDICINE

## 2024-01-27 PROCEDURE — 80048 BASIC METABOLIC PNL TOTAL CA: CPT

## 2024-01-27 PROCEDURE — 87209 SMEAR COMPLEX STAIN: CPT

## 2024-01-27 PROCEDURE — 87449 NOS EACH ORGANISM AG IA: CPT

## 2024-01-27 PROCEDURE — 85025 COMPLETE CBC W/AUTO DIFF WBC: CPT

## 2024-01-27 PROCEDURE — 83930 ASSAY OF BLOOD OSMOLALITY: CPT

## 2024-01-27 PROCEDURE — 83605 ASSAY OF LACTIC ACID: CPT

## 2024-01-27 PROCEDURE — 84443 ASSAY THYROID STIM HORMONE: CPT

## 2024-01-27 PROCEDURE — 96366 THER/PROPH/DIAG IV INF ADDON: CPT

## 2024-01-27 PROCEDURE — 83735 ASSAY OF MAGNESIUM: CPT

## 2024-01-27 PROCEDURE — 36415 COLL VENOUS BLD VENIPUNCTURE: CPT

## 2024-01-27 PROCEDURE — 96365 THER/PROPH/DIAG IV INF INIT: CPT

## 2024-01-27 PROCEDURE — 87506 IADNA-DNA/RNA PROBE TQ 6-11: CPT

## 2024-01-27 PROCEDURE — 2060000000 HC ICU INTERMEDIATE R&B

## 2024-01-27 PROCEDURE — 96361 HYDRATE IV INFUSION ADD-ON: CPT

## 2024-01-27 PROCEDURE — 2580000003 HC RX 258: Performed by: EMERGENCY MEDICINE

## 2024-01-27 PROCEDURE — 96376 TX/PRO/DX INJ SAME DRUG ADON: CPT

## 2024-01-27 RX ORDER — POTASSIUM CHLORIDE 7.45 MG/ML
10 INJECTION INTRAVENOUS
Status: COMPLETED | OUTPATIENT
Start: 2024-01-27 | End: 2024-01-27

## 2024-01-27 RX ORDER — SODIUM CHLORIDE 0.9 % (FLUSH) 0.9 %
5-40 SYRINGE (ML) INJECTION PRN
Status: DISCONTINUED | OUTPATIENT
Start: 2024-01-27 | End: 2024-02-12 | Stop reason: HOSPADM

## 2024-01-27 RX ORDER — MORPHINE SULFATE 2 MG/ML
2 INJECTION, SOLUTION INTRAMUSCULAR; INTRAVENOUS EVERY 6 HOURS PRN
Status: DISCONTINUED | OUTPATIENT
Start: 2024-01-27 | End: 2024-01-31

## 2024-01-27 RX ORDER — ONDANSETRON 2 MG/ML
4 INJECTION INTRAMUSCULAR; INTRAVENOUS
Status: COMPLETED | OUTPATIENT
Start: 2024-01-27 | End: 2024-01-27

## 2024-01-27 RX ORDER — SODIUM CHLORIDE 9 MG/ML
INJECTION, SOLUTION INTRAVENOUS CONTINUOUS
Status: DISCONTINUED | OUTPATIENT
Start: 2024-01-27 | End: 2024-01-27

## 2024-01-27 RX ORDER — 0.9 % SODIUM CHLORIDE 0.9 %
1000 INTRAVENOUS SOLUTION INTRAVENOUS ONCE
Status: COMPLETED | OUTPATIENT
Start: 2024-01-27 | End: 2024-01-27

## 2024-01-27 RX ORDER — POTASSIUM CHLORIDE 20 MEQ/1
20 TABLET, EXTENDED RELEASE ORAL
Status: COMPLETED | OUTPATIENT
Start: 2024-01-27 | End: 2024-01-27

## 2024-01-27 RX ORDER — SODIUM CHLORIDE 0.9 % (FLUSH) 0.9 %
5-40 SYRINGE (ML) INJECTION EVERY 12 HOURS SCHEDULED
Status: DISCONTINUED | OUTPATIENT
Start: 2024-01-27 | End: 2024-02-12 | Stop reason: HOSPADM

## 2024-01-27 RX ORDER — ONDANSETRON 2 MG/ML
4 INJECTION INTRAMUSCULAR; INTRAVENOUS EVERY 6 HOURS PRN
Status: DISCONTINUED | OUTPATIENT
Start: 2024-01-27 | End: 2024-01-27 | Stop reason: HOSPADM

## 2024-01-27 RX ORDER — ACETAMINOPHEN 650 MG/1
650 SUPPOSITORY RECTAL EVERY 6 HOURS PRN
Status: DISCONTINUED | OUTPATIENT
Start: 2024-01-27 | End: 2024-02-12 | Stop reason: HOSPADM

## 2024-01-27 RX ORDER — HEPARIN SODIUM 5000 [USP'U]/ML
5000 INJECTION, SOLUTION INTRAVENOUS; SUBCUTANEOUS EVERY 8 HOURS SCHEDULED
Status: DISCONTINUED | OUTPATIENT
Start: 2024-01-27 | End: 2024-01-28

## 2024-01-27 RX ORDER — POTASSIUM CHLORIDE 7.45 MG/ML
10 INJECTION INTRAVENOUS
Status: DISPENSED | OUTPATIENT
Start: 2024-01-27 | End: 2024-01-27

## 2024-01-27 RX ORDER — FENTANYL CITRATE 50 UG/ML
25 INJECTION, SOLUTION INTRAMUSCULAR; INTRAVENOUS
Status: COMPLETED | OUTPATIENT
Start: 2024-01-27 | End: 2024-01-27

## 2024-01-27 RX ORDER — ONDANSETRON 2 MG/ML
4 INJECTION INTRAMUSCULAR; INTRAVENOUS EVERY 6 HOURS PRN
Status: DISCONTINUED | OUTPATIENT
Start: 2024-01-27 | End: 2024-02-12 | Stop reason: HOSPADM

## 2024-01-27 RX ORDER — POTASSIUM CHLORIDE 7.45 MG/ML
10 INJECTION INTRAVENOUS ONCE
Status: COMPLETED | OUTPATIENT
Start: 2024-01-27 | End: 2024-01-27

## 2024-01-27 RX ORDER — DEXTROSE MONOHYDRATE 100 MG/ML
INJECTION, SOLUTION INTRAVENOUS CONTINUOUS PRN
Status: DISCONTINUED | OUTPATIENT
Start: 2024-01-27 | End: 2024-02-12 | Stop reason: HOSPADM

## 2024-01-27 RX ORDER — SODIUM CHLORIDE 9 MG/ML
INJECTION, SOLUTION INTRAVENOUS PRN
Status: DISCONTINUED | OUTPATIENT
Start: 2024-01-27 | End: 2024-02-12 | Stop reason: HOSPADM

## 2024-01-27 RX ORDER — SODIUM CHLORIDE, SODIUM LACTATE, POTASSIUM CHLORIDE, CALCIUM CHLORIDE 600; 310; 30; 20 MG/100ML; MG/100ML; MG/100ML; MG/100ML
INJECTION, SOLUTION INTRAVENOUS CONTINUOUS
Status: DISCONTINUED | OUTPATIENT
Start: 2024-01-27 | End: 2024-01-30 | Stop reason: SDUPTHER

## 2024-01-27 RX ORDER — POLYETHYLENE GLYCOL 3350 17 G/17G
17 POWDER, FOR SOLUTION ORAL DAILY PRN
Status: DISCONTINUED | OUTPATIENT
Start: 2024-01-27 | End: 2024-02-12 | Stop reason: HOSPADM

## 2024-01-27 RX ORDER — ACETAMINOPHEN 325 MG/1
650 TABLET ORAL EVERY 6 HOURS PRN
Status: DISCONTINUED | OUTPATIENT
Start: 2024-01-27 | End: 2024-02-12 | Stop reason: HOSPADM

## 2024-01-27 RX ORDER — ONDANSETRON 4 MG/1
4 TABLET, ORALLY DISINTEGRATING ORAL EVERY 8 HOURS PRN
Status: DISCONTINUED | OUTPATIENT
Start: 2024-01-27 | End: 2024-02-12 | Stop reason: HOSPADM

## 2024-01-27 RX ADMIN — SODIUM CHLORIDE, PRESERVATIVE FREE 10 ML: 5 INJECTION INTRAVENOUS at 22:08

## 2024-01-27 RX ADMIN — SODIUM CHLORIDE, POTASSIUM CHLORIDE, SODIUM LACTATE AND CALCIUM CHLORIDE: 600; 310; 30; 20 INJECTION, SOLUTION INTRAVENOUS at 22:34

## 2024-01-27 RX ADMIN — POTASSIUM CHLORIDE 10 MEQ: 7.46 INJECTION, SOLUTION INTRAVENOUS at 10:30

## 2024-01-27 RX ADMIN — POTASSIUM CHLORIDE 10 MEQ: 7.46 INJECTION, SOLUTION INTRAVENOUS at 13:51

## 2024-01-27 RX ADMIN — POTASSIUM CHLORIDE 10 MEQ: 7.46 INJECTION, SOLUTION INTRAVENOUS at 21:22

## 2024-01-27 RX ADMIN — POTASSIUM CHLORIDE 10 MEQ: 7.46 INJECTION, SOLUTION INTRAVENOUS at 16:16

## 2024-01-27 RX ADMIN — FENTANYL CITRATE 25 MCG: 50 INJECTION, SOLUTION INTRAMUSCULAR; INTRAVENOUS at 05:23

## 2024-01-27 RX ADMIN — POTASSIUM CHLORIDE 20 MEQ: 1500 TABLET, EXTENDED RELEASE ORAL at 02:46

## 2024-01-27 RX ADMIN — SODIUM CHLORIDE 1000 ML: 9 INJECTION, SOLUTION INTRAVENOUS at 01:06

## 2024-01-27 RX ADMIN — SODIUM CHLORIDE: 9 INJECTION, SOLUTION INTRAVENOUS at 10:30

## 2024-01-27 RX ADMIN — HEPARIN SODIUM 5000 UNITS: 5000 INJECTION INTRAVENOUS; SUBCUTANEOUS at 13:51

## 2024-01-27 RX ADMIN — POTASSIUM CHLORIDE 10 MEQ: 7.46 INJECTION, SOLUTION INTRAVENOUS at 15:03

## 2024-01-27 RX ADMIN — HEPARIN SODIUM 5000 UNITS: 5000 INJECTION INTRAVENOUS; SUBCUTANEOUS at 22:08

## 2024-01-27 RX ADMIN — SODIUM CHLORIDE 1000 ML: 9 INJECTION, SOLUTION INTRAVENOUS at 12:19

## 2024-01-27 RX ADMIN — POTASSIUM CHLORIDE 10 MEQ: 7.46 INJECTION, SOLUTION INTRAVENOUS at 02:49

## 2024-01-27 RX ADMIN — POTASSIUM CHLORIDE 10 MEQ: 7.46 INJECTION, SOLUTION INTRAVENOUS at 19:22

## 2024-01-27 RX ADMIN — POTASSIUM CHLORIDE 10 MEQ: 7.46 INJECTION, SOLUTION INTRAVENOUS at 12:17

## 2024-01-27 RX ADMIN — SODIUM CHLORIDE, POTASSIUM CHLORIDE, SODIUM LACTATE AND CALCIUM CHLORIDE: 600; 310; 30; 20 INJECTION, SOLUTION INTRAVENOUS at 14:40

## 2024-01-27 RX ADMIN — ONDANSETRON 4 MG: 2 INJECTION INTRAMUSCULAR; INTRAVENOUS at 05:23

## 2024-01-27 RX ADMIN — ONDANSETRON 4 MG: 2 INJECTION INTRAMUSCULAR; INTRAVENOUS at 01:06

## 2024-01-27 RX ADMIN — SODIUM CHLORIDE, PRESERVATIVE FREE 10 ML: 5 INJECTION INTRAVENOUS at 10:26

## 2024-01-27 ASSESSMENT — ENCOUNTER SYMPTOMS
RESPIRATORY NEGATIVE: 1
DIARRHEA: 1
VOMITING: 1
ABDOMINAL PAIN: 1
ALLERGIC/IMMUNOLOGIC NEGATIVE: 1
EYES NEGATIVE: 1
CONSTIPATION: 1
NAUSEA: 1
ABDOMINAL DISTENTION: 1

## 2024-01-27 ASSESSMENT — PAIN SCALES - GENERAL: PAINLEVEL_OUTOF10: 10

## 2024-01-27 ASSESSMENT — PAIN DESCRIPTION - LOCATION: LOCATION: ABDOMEN

## 2024-01-27 ASSESSMENT — PAIN - FUNCTIONAL ASSESSMENT: PAIN_FUNCTIONAL_ASSESSMENT: NONE - DENIES PAIN

## 2024-01-27 NOTE — ED NOTES
[facility] contacted to initiate new transfer for GI services d/t CAMRON Byrd RN states that she will call [facility] to check availability.

## 2024-01-27 NOTE — ED PROVIDER NOTES
[facility] EMERGENCY DEPT  EMERGENCY DEPARTMENT ENCOUNTER      Pt Name: Tim Morales Sr.  MRN: [id number]  Birthdate 1955  Date of evaluation: 1/26/2024  Provider: Keysha Stubbs MD  2:22 AM    CHIEF COMPLAINT       Chief Complaint   Patient presents with    Emesis         HISTORY OF PRESENT ILLNESS    Tim Morales Sr. is a 68 y.o. male who presents to the emergency department emesis over several days.     HPI:  Patient was constipated and took a laxative with loose stool associated with nausea or vomiting and abd pain.  She denies fever or chills.     Nursing Notes were reviewed.    REVIEW OF SYSTEMS       Review of Systems   Constitutional: Negative.    HENT: Negative.     Eyes: Negative.    Respiratory: Negative.     Cardiovascular: Negative.    Gastrointestinal:  Positive for abdominal distention, abdominal pain, constipation, diarrhea, nausea and vomiting.   Endocrine: Negative.    Genitourinary: Negative.    Musculoskeletal: Negative.    Skin: Negative.    Allergic/Immunologic: Negative.    Neurological: Negative.    Hematological: Negative.    Psychiatric/Behavioral: Negative.     All other systems reviewed and are negative.      Except as noted above the remainder of the review of systems was reviewed and negative and abd pain.      PAST MEDICAL HISTORY     Past Medical History:   Diagnosis Date    Hyperlipidemia     Hypertension          SURGICAL HISTORY       Past Surgical History:   Procedure Laterality Date    ABDOMEN SURGERY      unknown reason    ANKLE SURGERY Right     bone spurs removed    COLONOSCOPY      COLONOSCOPY N/A 5/11/2023    COLONOSCOPY DIAGNOSTIC performed by Lalit Villavicencio Jr., MD at [facility] ENDOSCOPY    FRACTURE SURGERY Bilateral          CURRENT MEDICATIONS       Previous Medications    AMLODIPINE (NORVASC) 10 MG TABLET    ceived the following from [facility]: Outside name: amLODIPine (NORVASC) 10 mg tablet    ASPIRIN 325 MG TABLET    Take 1 tablet by mouth daily     Complexity of Data Reviewed  Labs: ordered.  Radiology: ordered.    Risk  Prescription drug management.    DDX: gastroenteritis, bowel obstruction,diverticulitis diverticulosis, UTI, kidney stone, trauma, dehydration, electrolyte imbalance, Acutte kidney injury.    This is a 68-year-old gentleman who presented to the ED with abdominal pain progressively getting worse with nausea and vomiting patient's potassium was noted to be 2.7 he received 10 of potassium micrograms IV and 20 p.o. patient also received IV fluids normal saline 1.5 mL.  He received fentanyl for pain 25 mcg for pain.  Patient CT scan revealed he had a distal small bowel obstruction.  Consultation with Dr. Fisher surgeon at [facility] stated she will see him in consultation.  The admitting doctor hospitalist Dr. Martin excepted the admission for higher level of care.  Discussed the findings with the patient and the recommendation family and the patient is in agreement with transfer.    REASSESSMENT          CRITICAL CARE TIME   Total Critical Care time was  minutes, excluding separately reportable procedures.  There was a high probability of clinically significant/life threatening deterioration in the patient's condition which required my urgent intervention.      CONSULTS:  Dr. Fisher at [facility].  She will see patient in consult.  Dr. Loya [facility] accepted the patient to General Medicine floor on telemetry.      PROCEDURES:  Unless otherwise noted below, none     Procedures            DISPOSITION/PLAN   DISPOSITION        PATIENT REFERRED TO:  No follow-up provider specified.    DISCHARGE MEDICATIONS:  New Prescriptions    No medications on file     Controlled Substances Monitoring:          No data to display                (Please note that portions of this note were completed with a voice recognition program.  Efforts were made to edit the dictations but occasionally words are mis-transcribed.)    Keysha Stubbs MD (electronically

## 2024-01-27 NOTE — H&P
[facility]  [address] [place] [address] [phone]    Admission History and Physical      NAME:  Tim Morales Sr.   :   1955   MRN:  [id number]     PCP:  Saurabh Goldstein MD     Date/Time of service:  2024  12:53 PM        Subjective:     CHIEF COMPLAINT: Abdominal pain    HISTORY OF PRESENT ILLNESS:     Mr. Morales is a 68 y.o.   male with a past medical history of hypertension, hyperlipidemia, chronic shoulder pain who is admitted with concern for small bowel obstruction and CHETNA.  Patient is a very poor historian.  Mr. Morales states that during the middle of last week he developed nausea and vomiting and then developed worsening severe lower abdominal pain.  He states he has also had some constipation but was able to have bowel movement on Thursday.  He endorses a history of abdominal surgeries but is unsure what type of surgeries.  He denies any associated bleeding.  He denies any associated chest pain, shortness of breath or dizziness.  Of note, he was also noted to be in A-fib; denies any history of this.    No Known Allergies    Prior to Admission medications    Medication Sig Start Date End Date Taking? Authorizing Provider   aspirin 325 MG tablet Take 1 tablet by mouth daily    Provider, MD Ulises   amLODIPine (NORVASC) 10 MG tablet ceived the following from [facility]: Outside name: amLODIPine (NORVASC) 10 mg tablet 21   Automatic Reconciliation, Ar   hydrALAZINE (APRESOLINE) 50 MG tablet 2 tablets 3/31/21   Automatic Reconciliation, Ar   magnesium oxide (MAG-OX) 400 MG tablet TAKE 1 TABLET BY MOUTH TWICE DAILY 20   Automatic Reconciliation, Ar   olmesartan (BENICAR) 40 MG tablet ceived the following from [facility]: Outside name: olmesartan (BENICAR) 40 mg tablet 21   Automatic Reconciliation, Ar   ondansetron (ZOFRAN-ODT) 4 MG disintegrating tablet Take 1 tablet by mouth as needed

## 2024-01-27 NOTE — ED TRIAGE NOTES
Pt states that he has been having episodes of emesis for the last few days. Pt took a laxative and is having bowel movements but even when he drinks water he vomits that up. Pt was have abdominal soreness but not pain.

## 2024-01-28 ENCOUNTER — APPOINTMENT (OUTPATIENT)
Facility: HOSPITAL | Age: 69
DRG: 335 | End: 2024-01-28
Attending: INTERNAL MEDICINE
Payer: MEDICARE

## 2024-01-28 LAB
ANION GAP SERPL CALC-SCNC: 10 MMOL/L (ref 5–15)
ANION GAP SERPL CALC-SCNC: 10 MMOL/L (ref 5–15)
ANION GAP SERPL CALC-SCNC: 8 MMOL/L (ref 5–15)
APPEARANCE UR: CLEAR
APTT PPP: 30.5 SEC (ref 22.1–31)
BACTERIA URNS QL MICRO: NEGATIVE /HPF
BASOPHILS # BLD: 0 K/UL (ref 0–0.1)
BASOPHILS NFR BLD: 0 % (ref 0–1)
BILIRUB UR QL: NEGATIVE
BUN SERPL-MCNC: 54 MG/DL (ref 6–20)
BUN SERPL-MCNC: 54 MG/DL (ref 6–20)
BUN SERPL-MCNC: 57 MG/DL (ref 6–20)
BUN/CREAT SERPL: 11 (ref 12–20)
BUN/CREAT SERPL: 9 (ref 12–20)
BUN/CREAT SERPL: 9 (ref 12–20)
C DIFF GDH STL QL: NEGATIVE
C DIFF TOX A+B STL QL IA: NEGATIVE
C DIFF TOXIN INTERPRETATION: NORMAL
CALCIUM SERPL-MCNC: 8.3 MG/DL (ref 8.5–10.1)
CHLORIDE SERPL-SCNC: 98 MMOL/L (ref 97–108)
CHLORIDE SERPL-SCNC: 99 MMOL/L (ref 97–108)
CHLORIDE SERPL-SCNC: 99 MMOL/L (ref 97–108)
CHLORIDE UR-SCNC: 11 MMOL/L
CO2 SERPL-SCNC: 29 MMOL/L (ref 21–32)
CO2 SERPL-SCNC: 31 MMOL/L (ref 21–32)
CO2 SERPL-SCNC: 34 MMOL/L (ref 21–32)
COLOR UR: ABNORMAL
CREAT SERPL-MCNC: 5.31 MG/DL (ref 0.7–1.3)
CREAT SERPL-MCNC: 5.93 MG/DL (ref 0.7–1.3)
CREAT SERPL-MCNC: 6.09 MG/DL (ref 0.7–1.3)
CREAT UR-MCNC: 324 MG/DL
DIFFERENTIAL METHOD BLD: ABNORMAL
ECHO AO ASC DIAM: 3.3 CM
ECHO AO ASCENDING AORTA INDEX: 1.56 CM/M2
ECHO AV AREA PEAK VELOCITY: 2.1 CM2
ECHO AV AREA VTI: 2.2 CM2
ECHO AV AREA/BSA PEAK VELOCITY: 1 CM2/M2
ECHO AV AREA/BSA VTI: 1 CM2/M2
ECHO AV MEAN GRADIENT: 6 MMHG
ECHO AV MEAN VELOCITY: 1.2 M/S
ECHO AV PEAK GRADIENT: 14 MMHG
ECHO AV PEAK VELOCITY: 1.9 M/S
ECHO AV VELOCITY RATIO: 0.68
ECHO AV VTI: 27.5 CM
ECHO BSA: 2.14 M2
ECHO LA DIAMETER INDEX: 1.98 CM/M2
ECHO LA DIAMETER: 4.2 CM
ECHO LA VOL A-L A2C: 59 ML (ref 18–58)
ECHO LA VOL A-L A4C: 61 ML (ref 18–58)
ECHO LA VOL BP: 58 ML (ref 18–58)
ECHO LA VOL MOD A2C: 56 ML (ref 18–58)
ECHO LA VOL MOD A4C: 59 ML (ref 18–58)
ECHO LA VOL/BSA BIPLANE: 27 ML/M2 (ref 16–34)
ECHO LA VOLUME AREA LENGTH: 61 ML
ECHO LA VOLUME INDEX A-L A2C: 28 ML/M2 (ref 16–34)
ECHO LA VOLUME INDEX A-L A4C: 29 ML/M2 (ref 16–34)
ECHO LA VOLUME INDEX AREA LENGTH: 29 ML/M2 (ref 16–34)
ECHO LA VOLUME INDEX MOD A2C: 26 ML/M2 (ref 16–34)
ECHO LA VOLUME INDEX MOD A4C: 28 ML/M2 (ref 16–34)
ECHO LV E' LATERAL VELOCITY: 14 CM/S
ECHO LV E' SEPTAL VELOCITY: 7 CM/S
ECHO LV EDV A2C: 73 ML
ECHO LV EDV A4C: 76 ML
ECHO LV EDV BP: 75 ML (ref 67–155)
ECHO LV EDV INDEX A4C: 36 ML/M2
ECHO LV EDV INDEX BP: 35 ML/M2
ECHO LV EDV NDEX A2C: 34 ML/M2
ECHO LV EJECTION FRACTION A2C: 62 %
ECHO LV EJECTION FRACTION A4C: 63 %
ECHO LV EJECTION FRACTION BIPLANE: 60 % (ref 55–100)
ECHO LV ESV A2C: 28 ML
ECHO LV ESV A4C: 28 ML
ECHO LV ESV BP: 29 ML (ref 22–58)
ECHO LV ESV INDEX A2C: 13 ML/M2
ECHO LV ESV INDEX A4C: 13 ML/M2
ECHO LV ESV INDEX BP: 14 ML/M2
ECHO LV FRACTIONAL SHORTENING: 35 % (ref 28–44)
ECHO LV INTERNAL DIMENSION DIASTOLE INDEX: 2.17 CM/M2
ECHO LV INTERNAL DIMENSION DIASTOLIC: 4.6 CM (ref 4.2–5.9)
ECHO LV INTERNAL DIMENSION SYSTOLIC INDEX: 1.42 CM/M2
ECHO LV INTERNAL DIMENSION SYSTOLIC: 3 CM
ECHO LV IVSD: 1.4 CM (ref 0.6–1)
ECHO LV MASS 2D: 256.8 G (ref 88–224)
ECHO LV MASS INDEX 2D: 121.1 G/M2 (ref 49–115)
ECHO LV POSTERIOR WALL DIASTOLIC: 1.4 CM (ref 0.6–1)
ECHO LV RELATIVE WALL THICKNESS RATIO: 0.61
ECHO LVOT AREA: 3.1 CM2
ECHO LVOT AV VTI INDEX: 0.69
ECHO LVOT DIAM: 2 CM
ECHO LVOT MEAN GRADIENT: 3 MMHG
ECHO LVOT PEAK GRADIENT: 6 MMHG
ECHO LVOT PEAK VELOCITY: 1.3 M/S
ECHO LVOT STROKE VOLUME INDEX: 28.1 ML/M2
ECHO LVOT SV: 59.7 ML
ECHO LVOT VTI: 19 CM
ECHO MV A VELOCITY: 0.21 M/S
ECHO MV E DECELERATION TIME (DT): 162.7 MS
ECHO MV E VELOCITY: 1.02 M/S
ECHO MV E/A RATIO: 4.86
ECHO MV E/E' LATERAL: 7.29
ECHO MV E/E' RATIO (AVERAGED): 10.93
ECHO PV MAX VELOCITY: 1.5 M/S
ECHO PV PEAK GRADIENT: 9 MMHG
ECHO RV INTERNAL DIMENSION: 4.1 CM
ECHO RV TAPSE: 1.3 CM (ref 1.7–?)
ECHO RVOT PEAK GRADIENT: 6 MMHG
ECHO RVOT PEAK VELOCITY: 1.3 M/S
ECHO TV REGURGITANT MAX VELOCITY: 2.21 M/S
ECHO TV REGURGITANT PEAK GRADIENT: 19 MMHG
EKG DIAGNOSIS: NORMAL
EKG Q-T INTERVAL: 416 MS
EKG QRS DURATION: 86 MS
EKG QTC CALCULATION (BAZETT): 533 MS
EKG R AXIS: 3 DEGREES
EKG T AXIS: 149 DEGREES
EKG VENTRICULAR RATE: 99 BPM
EOSINOPHIL # BLD: 0 K/UL (ref 0–0.4)
EOSINOPHIL NFR BLD: 0 % (ref 0–7)
EPITH CASTS URNS QL MICRO: ABNORMAL /LPF
ERYTHROCYTE [DISTWIDTH] IN BLOOD BY AUTOMATED COUNT: 14 % (ref 11.5–14.5)
ERYTHROCYTE [DISTWIDTH] IN BLOOD BY AUTOMATED COUNT: 14.1 % (ref 11.5–14.5)
GLUCOSE BLD STRIP.AUTO-MCNC: 101 MG/DL (ref 65–117)
GLUCOSE BLD STRIP.AUTO-MCNC: 112 MG/DL (ref 65–117)
GLUCOSE BLD STRIP.AUTO-MCNC: 66 MG/DL (ref 65–117)
GLUCOSE BLD STRIP.AUTO-MCNC: 67 MG/DL (ref 65–117)
GLUCOSE BLD STRIP.AUTO-MCNC: 72 MG/DL (ref 65–117)
GLUCOSE BLD STRIP.AUTO-MCNC: 84 MG/DL (ref 65–117)
GLUCOSE SERPL-MCNC: 67 MG/DL (ref 65–100)
GLUCOSE SERPL-MCNC: 72 MG/DL (ref 65–100)
GLUCOSE SERPL-MCNC: 72 MG/DL (ref 65–100)
GLUCOSE UR STRIP.AUTO-MCNC: NEGATIVE MG/DL
HCT VFR BLD AUTO: 40.3 % (ref 36.6–50.3)
HCT VFR BLD AUTO: 40.7 % (ref 36.6–50.3)
HGB BLD-MCNC: 13.1 G/DL (ref 12.1–17)
HGB BLD-MCNC: 13.3 G/DL (ref 12.1–17)
HGB UR QL STRIP: NEGATIVE
HYALINE CASTS URNS QL MICRO: ABNORMAL /LPF (ref 0–5)
IMM GRANULOCYTES # BLD AUTO: 0 K/UL (ref 0–0.04)
IMM GRANULOCYTES NFR BLD AUTO: 0 % (ref 0–0.5)
INR PPP: 1.1 (ref 0.9–1.1)
KETONES UR QL STRIP.AUTO: ABNORMAL MG/DL
LEUKOCYTE ESTERASE UR QL STRIP.AUTO: NEGATIVE
LYMPHOCYTES # BLD: 1.1 K/UL (ref 0.8–3.5)
LYMPHOCYTES NFR BLD: 16 % (ref 12–49)
MAGNESIUM SERPL-MCNC: 2.5 MG/DL (ref 1.6–2.4)
MCH RBC QN AUTO: 29.2 PG (ref 26–34)
MCH RBC QN AUTO: 29.6 PG (ref 26–34)
MCHC RBC AUTO-ENTMCNC: 32.5 G/DL (ref 30–36.5)
MCHC RBC AUTO-ENTMCNC: 32.7 G/DL (ref 30–36.5)
MCV RBC AUTO: 90 FL (ref 80–99)
MCV RBC AUTO: 90.4 FL (ref 80–99)
MONOCYTES # BLD: 1.1 K/UL (ref 0–1)
MONOCYTES NFR BLD: 16 % (ref 5–13)
NEUTS SEG # BLD: 4.6 K/UL (ref 1.8–8)
NEUTS SEG NFR BLD: 68 % (ref 32–75)
NITRITE UR QL STRIP.AUTO: NEGATIVE
NRBC # BLD: 0 K/UL (ref 0–0.01)
NRBC # BLD: 0 K/UL (ref 0–0.01)
NRBC BLD-RTO: 0 PER 100 WBC
NRBC BLD-RTO: 0 PER 100 WBC
OSMOLALITY UR: 355 MOSM/KG H2O
PH UR STRIP: 5 (ref 5–8)
PLATELET # BLD AUTO: 176 K/UL (ref 150–400)
PLATELET # BLD AUTO: 190 K/UL (ref 150–400)
PMV BLD AUTO: 9.5 FL (ref 8.9–12.9)
PMV BLD AUTO: 9.6 FL (ref 8.9–12.9)
POTASSIUM SERPL-SCNC: 2.9 MMOL/L (ref 3.5–5.1)
POTASSIUM SERPL-SCNC: 3 MMOL/L (ref 3.5–5.1)
POTASSIUM SERPL-SCNC: 3 MMOL/L (ref 3.5–5.1)
POTASSIUM UR-SCNC: 69 MMOL/L
PROT UR STRIP-MCNC: 100 MG/DL
PROTHROMBIN TIME: 11.7 SEC (ref 9–11.1)
RBC # BLD AUTO: 4.48 M/UL (ref 4.1–5.7)
RBC # BLD AUTO: 4.5 M/UL (ref 4.1–5.7)
RBC #/AREA URNS HPF: ABNORMAL /HPF (ref 0–5)
SERVICE CMNT-IMP: NORMAL
SODIUM SERPL-SCNC: 138 MMOL/L (ref 136–145)
SODIUM SERPL-SCNC: 139 MMOL/L (ref 136–145)
SODIUM SERPL-SCNC: 141 MMOL/L (ref 136–145)
SODIUM UR-SCNC: 15 MMOL/L
SP GR UR REFRACTOMETRY: 1.01 (ref 1–1.03)
SPECIMEN HOLD: NORMAL
THERAPEUTIC RANGE: NORMAL SECS (ref 58–77)
UFH PPP CHRO-ACNC: 0.51 IU/ML
UFH PPP CHRO-ACNC: <0.1 IU/ML
UROBILINOGEN UR QL STRIP.AUTO: 1 EU/DL (ref 0.2–1)
UUN UR-MCNC: 326 MG/DL
WBC # BLD AUTO: 6.9 K/UL (ref 4.1–11.1)
WBC # BLD AUTO: 7.4 K/UL (ref 4.1–11.1)
WBC URNS QL MICRO: ABNORMAL /HPF (ref 0–4)

## 2024-01-28 PROCEDURE — 2500000003 HC RX 250 WO HCPCS: Performed by: INTERNAL MEDICINE

## 2024-01-28 PROCEDURE — 84133 ASSAY OF URINE POTASSIUM: CPT

## 2024-01-28 PROCEDURE — 6360000002 HC RX W HCPCS: Performed by: INTERNAL MEDICINE

## 2024-01-28 PROCEDURE — 2580000003 HC RX 258: Performed by: INTERNAL MEDICINE

## 2024-01-28 PROCEDURE — 94761 N-INVAS EAR/PLS OXIMETRY MLT: CPT

## 2024-01-28 PROCEDURE — 93306 TTE W/DOPPLER COMPLETE: CPT

## 2024-01-28 PROCEDURE — 85520 HEPARIN ASSAY: CPT

## 2024-01-28 PROCEDURE — 85027 COMPLETE CBC AUTOMATED: CPT

## 2024-01-28 PROCEDURE — 84244 ASSAY OF RENIN: CPT

## 2024-01-28 PROCEDURE — 76770 US EXAM ABDO BACK WALL COMP: CPT

## 2024-01-28 PROCEDURE — 93306 TTE W/DOPPLER COMPLETE: CPT | Performed by: SPECIALIST

## 2024-01-28 PROCEDURE — 80048 BASIC METABOLIC PNL TOTAL CA: CPT

## 2024-01-28 PROCEDURE — 82436 ASSAY OF URINE CHLORIDE: CPT

## 2024-01-28 PROCEDURE — 81001 URINALYSIS AUTO W/SCOPE: CPT

## 2024-01-28 PROCEDURE — 82088 ASSAY OF ALDOSTERONE: CPT

## 2024-01-28 PROCEDURE — 6370000000 HC RX 637 (ALT 250 FOR IP): Performed by: INTERNAL MEDICINE

## 2024-01-28 PROCEDURE — 83735 ASSAY OF MAGNESIUM: CPT

## 2024-01-28 PROCEDURE — 82962 GLUCOSE BLOOD TEST: CPT

## 2024-01-28 PROCEDURE — 82570 ASSAY OF URINE CREATININE: CPT

## 2024-01-28 PROCEDURE — 2060000000 HC ICU INTERMEDIATE R&B

## 2024-01-28 PROCEDURE — 85025 COMPLETE CBC W/AUTO DIFF WBC: CPT

## 2024-01-28 PROCEDURE — 84300 ASSAY OF URINE SODIUM: CPT

## 2024-01-28 PROCEDURE — 99233 SBSQ HOSP IP/OBS HIGH 50: CPT | Performed by: INTERNAL MEDICINE

## 2024-01-28 PROCEDURE — 83935 ASSAY OF URINE OSMOLALITY: CPT

## 2024-01-28 PROCEDURE — 85610 PROTHROMBIN TIME: CPT

## 2024-01-28 PROCEDURE — 85730 THROMBOPLASTIN TIME PARTIAL: CPT

## 2024-01-28 PROCEDURE — 84540 ASSAY OF URINE/UREA-N: CPT

## 2024-01-28 PROCEDURE — 74018 RADEX ABDOMEN 1 VIEW: CPT

## 2024-01-28 PROCEDURE — 36415 COLL VENOUS BLD VENIPUNCTURE: CPT

## 2024-01-28 RX ORDER — PREDNISONE 10 MG/1
10 TABLET ORAL DAILY
Status: DISCONTINUED | OUTPATIENT
Start: 2024-01-28 | End: 2024-02-07

## 2024-01-28 RX ORDER — HEPARIN SODIUM 1000 [USP'U]/ML
4000 INJECTION, SOLUTION INTRAVENOUS; SUBCUTANEOUS PRN
Status: DISPENSED | OUTPATIENT
Start: 2024-01-28 | End: 2024-02-04

## 2024-01-28 RX ORDER — HEPARIN SODIUM 1000 [USP'U]/ML
2000 INJECTION, SOLUTION INTRAVENOUS; SUBCUTANEOUS PRN
Status: DISPENSED | OUTPATIENT
Start: 2024-01-28 | End: 2024-02-04

## 2024-01-28 RX ORDER — HEPARIN SODIUM 1000 [USP'U]/ML
4000 INJECTION, SOLUTION INTRAVENOUS; SUBCUTANEOUS ONCE
Status: COMPLETED | OUTPATIENT
Start: 2024-01-28 | End: 2024-01-28

## 2024-01-28 RX ORDER — DILTIAZEM HYDROCHLORIDE 5 MG/ML
10 INJECTION INTRAVENOUS ONCE
Status: COMPLETED | OUTPATIENT
Start: 2024-01-28 | End: 2024-01-28

## 2024-01-28 RX ORDER — POTASSIUM CHLORIDE 7.45 MG/ML
10 INJECTION INTRAVENOUS
Status: COMPLETED | OUTPATIENT
Start: 2024-01-28 | End: 2024-01-28

## 2024-01-28 RX ORDER — POTASSIUM CHLORIDE 7.45 MG/ML
10 INJECTION INTRAVENOUS
Status: DISPENSED | OUTPATIENT
Start: 2024-01-28 | End: 2024-01-28

## 2024-01-28 RX ORDER — LORAZEPAM 2 MG/ML
0.5 INJECTION INTRAMUSCULAR EVERY 4 HOURS PRN
Status: DISCONTINUED | OUTPATIENT
Start: 2024-01-28 | End: 2024-02-12 | Stop reason: HOSPADM

## 2024-01-28 RX ORDER — LORAZEPAM 0.5 MG/1
0.5 TABLET ORAL EVERY 4 HOURS PRN
Status: DISCONTINUED | OUTPATIENT
Start: 2024-01-28 | End: 2024-01-28

## 2024-01-28 RX ORDER — POTASSIUM CHLORIDE 7.45 MG/ML
10 INJECTION INTRAVENOUS
Status: DISCONTINUED | OUTPATIENT
Start: 2024-01-28 | End: 2024-01-28

## 2024-01-28 RX ORDER — POTASSIUM CHLORIDE 7.45 MG/ML
10 INJECTION INTRAVENOUS
Status: DISCONTINUED | OUTPATIENT
Start: 2024-01-28 | End: 2024-01-28 | Stop reason: SDUPTHER

## 2024-01-28 RX ORDER — HEPARIN SODIUM 10000 [USP'U]/100ML
5-30 INJECTION, SOLUTION INTRAVENOUS CONTINUOUS
Status: DISCONTINUED | OUTPATIENT
Start: 2024-01-28 | End: 2024-02-01

## 2024-01-28 RX ORDER — LABETALOL HYDROCHLORIDE 5 MG/ML
10 INJECTION, SOLUTION INTRAVENOUS EVERY 8 HOURS PRN
Status: DISCONTINUED | OUTPATIENT
Start: 2024-01-28 | End: 2024-02-06

## 2024-01-28 RX ORDER — DEXTROSE MONOHYDRATE 50 MG/ML
INJECTION, SOLUTION INTRAVENOUS CONTINUOUS
Status: DISCONTINUED | OUTPATIENT
Start: 2024-01-28 | End: 2024-02-02

## 2024-01-28 RX ADMIN — SODIUM CHLORIDE 5 MG/HR: 900 INJECTION, SOLUTION INTRAVENOUS at 17:25

## 2024-01-28 RX ADMIN — DEXTROSE MONOHYDRATE 125 ML: 100 INJECTION, SOLUTION INTRAVENOUS at 12:22

## 2024-01-28 RX ADMIN — LORAZEPAM 0.5 MG: 2 INJECTION INTRAMUSCULAR; INTRAVENOUS at 22:34

## 2024-01-28 RX ADMIN — LABETALOL HYDROCHLORIDE 10 MG: 5 INJECTION, SOLUTION INTRAVENOUS at 20:10

## 2024-01-28 RX ADMIN — POTASSIUM CHLORIDE 10 MEQ: 7.46 INJECTION, SOLUTION INTRAVENOUS at 10:06

## 2024-01-28 RX ADMIN — HEPARIN SODIUM 4000 UNITS: 1000 INJECTION INTRAVENOUS; SUBCUTANEOUS at 10:05

## 2024-01-28 RX ADMIN — POTASSIUM CHLORIDE 10 MEQ: 7.46 INJECTION, SOLUTION INTRAVENOUS at 18:18

## 2024-01-28 RX ADMIN — HEPARIN SODIUM AND DEXTROSE 10 UNITS/KG/HR: 10000; 5 INJECTION INTRAVENOUS at 10:03

## 2024-01-28 RX ADMIN — HEPARIN SODIUM 5000 UNITS: 5000 INJECTION INTRAVENOUS; SUBCUTANEOUS at 05:59

## 2024-01-28 RX ADMIN — POTASSIUM CHLORIDE 10 MEQ: 7.46 INJECTION, SOLUTION INTRAVENOUS at 21:50

## 2024-01-28 RX ADMIN — DEXTROSE MONOHYDRATE: 50 INJECTION, SOLUTION INTRAVENOUS at 17:15

## 2024-01-28 RX ADMIN — SODIUM CHLORIDE, POTASSIUM CHLORIDE, SODIUM LACTATE AND CALCIUM CHLORIDE: 600; 310; 30; 20 INJECTION, SOLUTION INTRAVENOUS at 15:44

## 2024-01-28 RX ADMIN — SODIUM CHLORIDE, POTASSIUM CHLORIDE, SODIUM LACTATE AND CALCIUM CHLORIDE: 600; 310; 30; 20 INJECTION, SOLUTION INTRAVENOUS at 06:21

## 2024-01-28 RX ADMIN — POTASSIUM CHLORIDE 10 MEQ: 7.46 INJECTION, SOLUTION INTRAVENOUS at 11:35

## 2024-01-28 RX ADMIN — POTASSIUM CHLORIDE 10 MEQ: 7.46 INJECTION, SOLUTION INTRAVENOUS at 12:57

## 2024-01-28 RX ADMIN — POTASSIUM CHLORIDE 10 MEQ: 7.46 INJECTION, SOLUTION INTRAVENOUS at 08:05

## 2024-01-28 RX ADMIN — ONDANSETRON 4 MG: 2 INJECTION INTRAMUSCULAR; INTRAVENOUS at 18:12

## 2024-01-28 RX ADMIN — PREDNISONE 10 MG: 10 TABLET ORAL at 17:23

## 2024-01-28 RX ADMIN — POTASSIUM CHLORIDE 10 MEQ: 7.46 INJECTION, SOLUTION INTRAVENOUS at 20:49

## 2024-01-28 RX ADMIN — SODIUM CHLORIDE, PRESERVATIVE FREE 10 ML: 5 INJECTION INTRAVENOUS at 08:05

## 2024-01-28 RX ADMIN — DILTIAZEM HYDROCHLORIDE 10 MG: 5 INJECTION, SOLUTION INTRAVENOUS at 15:44

## 2024-01-28 RX ADMIN — POTASSIUM CHLORIDE 10 MEQ: 7.46 INJECTION, SOLUTION INTRAVENOUS at 19:25

## 2024-01-28 NOTE — CONSULTS
[facility]  CONSULTATION    Name:  STEW EDEN SR  MR#:  [id number]  :  1955  ACCOUNT #:  [id number]  DATE OF SERVICE:  2024    PRIMARY CARE PROVIDER:  Saurabh Goldstein MD.    REASON FOR CONSULTATION:  Consult note for small bowel obstruction.    HISTORY OF PRESENT ILLNESS:  The patient is a 68-year--old male who had lower midline surgery 10 plus years ago; he is unsure of the reason.  He has no prior oncologic, neurological, or colorectal surgery noted.  He came to the Emergency Department at an outside hospital with abdominal pain, nausea, vomiting.  CT scan revealed dilated fluid-filled small bowel loops with distal decompressed small bowel.  He was transferred to [facility] for management.    PAST MEDICAL HISTORY:  1.  Hyperlipidemia.  2.  Hypertension.    MEDICATIONS PRIOR TO ADMISSION:  Home medications:  1.  Amlodipine.  2.  Aspirin.  3.  Hydralazine.  4.    5.  Potassium chloride.  6.  Prednisone.  7.  Rosuvastatin.    ALLERGIES:  NONE.    SURGERIES:  See HPI.  1.  Bone spurs removed.  2.  Ankle surgery.  3.  See history of abdominal surgery.    SOCIAL HISTORY:  Never smoking.  He drinks alcohol occasionally.    REVIEW OF SYSTEMS:  12-point review of systems  was negative other than what was listed in the HPI.  The patient has not had colonoscopy.    PHYSICAL EXAMINATION:  GENERAL:  Age-appropriate male, in no apparent distress.  VITAL SIGNS:  Reviewed.  HEENT:  Normocephalic, atraumatic.  NECK:  Supple.  Trachea is midline.  CHEST:  Clear.  HEART:  Rate is regular.  ABDOMEN:  Soft, distended, tympanitic, no evidence of peritonitis.  Well-healed lower abdominal surgical scar.  MUSCULOSKELETAL:  No clubbing, cyanosis, or edema.  SKIN:  Clear.  No rashes or lesions.  PSYCHIATRIC:  Appropriate to questioning and pleasant.  NEUROLOGIC:  Grossly nonfocal.    RADIOLOGY:  Reviewed.    LABORATORY DATA:  Reviewed.    ASSESSMENT:  Small bowel obstruction 
recent):  Xray Result (most recent):  XR CHEST STANDARD TWO VW 03/17/2021    Narrative  Chest 2 views.    No prior comparison study in [facility] PACS.    Elevation right hemidiaphragm.  Lungs clear; no interstitial or alveolar pulmonary edema.  Cardiac and mediastinal structures are within normal limits.  No pneumothorax or sizable pleural effusion.  Thoracic spondylosis.      CT Result (most recent):  CT ABDOMEN PELVIS WO IV CONTRAST 01/27/2024    Narrative  EXAM: CT ABDOMEN PELVIS WO CONTRAST    INDICATION: abd pain    COMPARISON: None    IV CONTRAST: None.    ORAL CONTRAST: None    TECHNIQUE:  Thin axial images were obtained through the abdomen and pelvis. Coronal and  sagittal reformats were generated. CT dose reduction was achieved through use of  a standardized protocol tailored for this examination and automatic exposure  control for dose modulation.    The absence of intravenous contrast material reduces the sensitivity for  evaluation of the vasculature and solid organs.    FINDINGS:  LOWER THORAX: Moderate coronary artery calcifications.  LIVER: Contains multiple subcentimeter hypodensities, too small to characterize,  but likely cysts.  BILIARY TREE: Gallbladder is within normal limits. CBD is not dilated.  SPLEEN: within normal limits.  PANCREAS: No focal abnormality.  ADRENALS: Unremarkable.  KIDNEYS/URETERS: No hydronephrosis. 2 mm nonobstructing left renal calculus.  Small bilateral simple renal cysts do not require imaging follow-up.  STOMACH: Dilated and fluid-filled.  SMALL BOWEL: Dilated, fluid-filled bowel loops are seen throughout the abdomen,  with a probable transition point in the right lower quadrant. The distal small  bowel is decompressed.  COLON: No dilatation or wall thickening.  APPENDIX: Normal.  PERITONEUM: No ascites or pneumoperitoneum.  RETROPERITONEUM: No lymphadenopathy or aortic aneurysm.  REPRODUCTIVE ORGANS: Unremarkable.  URINARY BLADDER: Decompressed  BONES: No destructive bone 
:     [phone]    [place] office:  [address] [place] [address]  Phone - [phone]  Fax - [phone]

## 2024-01-29 ENCOUNTER — APPOINTMENT (OUTPATIENT)
Facility: HOSPITAL | Age: 69
DRG: 335 | End: 2024-01-29
Attending: INTERNAL MEDICINE
Payer: MEDICARE

## 2024-01-29 LAB
ANION GAP SERPL CALC-SCNC: 11 MMOL/L (ref 5–15)
BASOPHILS # BLD: 0 K/UL (ref 0–0.1)
BASOPHILS NFR BLD: 0 % (ref 0–1)
BUN SERPL-MCNC: 58 MG/DL (ref 6–20)
BUN/CREAT SERPL: 11 (ref 12–20)
CALCIUM SERPL-MCNC: 8.3 MG/DL (ref 8.5–10.1)
CHLORIDE SERPL-SCNC: 97 MMOL/L (ref 97–108)
CO2 SERPL-SCNC: 31 MMOL/L (ref 21–32)
CREAT SERPL-MCNC: 5.08 MG/DL (ref 0.7–1.3)
DIFFERENTIAL METHOD BLD: ABNORMAL
EOSINOPHIL # BLD: 0 K/UL (ref 0–0.4)
EOSINOPHIL NFR BLD: 0 % (ref 0–7)
ERYTHROCYTE [DISTWIDTH] IN BLOOD BY AUTOMATED COUNT: 13.9 % (ref 11.5–14.5)
GLUCOSE BLD STRIP.AUTO-MCNC: 123 MG/DL (ref 65–117)
GLUCOSE BLD STRIP.AUTO-MCNC: 140 MG/DL (ref 65–117)
GLUCOSE BLD STRIP.AUTO-MCNC: 142 MG/DL (ref 65–117)
GLUCOSE BLD STRIP.AUTO-MCNC: 145 MG/DL (ref 65–117)
GLUCOSE BLD STRIP.AUTO-MCNC: 162 MG/DL (ref 65–117)
GLUCOSE SERPL-MCNC: 120 MG/DL (ref 65–100)
HCT VFR BLD AUTO: 40 % (ref 36.6–50.3)
HGB BLD-MCNC: 13.2 G/DL (ref 12.1–17)
IMM GRANULOCYTES # BLD AUTO: 0.1 K/UL (ref 0–0.04)
IMM GRANULOCYTES NFR BLD AUTO: 1 % (ref 0–0.5)
LYMPHOCYTES # BLD: 0.6 K/UL (ref 0.8–3.5)
LYMPHOCYTES NFR BLD: 9 % (ref 12–49)
MAGNESIUM SERPL-MCNC: 2.5 MG/DL (ref 1.6–2.4)
MCH RBC QN AUTO: 29.3 PG (ref 26–34)
MCHC RBC AUTO-ENTMCNC: 33 G/DL (ref 30–36.5)
MCV RBC AUTO: 88.7 FL (ref 80–99)
MONOCYTES # BLD: 0.8 K/UL (ref 0–1)
MONOCYTES NFR BLD: 12 % (ref 5–13)
NEUTS SEG # BLD: 5 K/UL (ref 1.8–8)
NEUTS SEG NFR BLD: 78 % (ref 32–75)
NRBC # BLD: 0 K/UL (ref 0–0.01)
NRBC BLD-RTO: 0 PER 100 WBC
PLATELET # BLD AUTO: 177 K/UL (ref 150–400)
PMV BLD AUTO: 9.5 FL (ref 8.9–12.9)
POTASSIUM SERPL-SCNC: 3.3 MMOL/L (ref 3.5–5.1)
RBC # BLD AUTO: 4.51 M/UL (ref 4.1–5.7)
RBC MORPH BLD: ABNORMAL
SERVICE CMNT-IMP: ABNORMAL
SODIUM SERPL-SCNC: 139 MMOL/L (ref 136–145)
UFH PPP CHRO-ACNC: 0.3 IU/ML
WBC # BLD AUTO: 6.5 K/UL (ref 4.1–11.1)

## 2024-01-29 PROCEDURE — 2700000000 HC OXYGEN THERAPY PER DAY

## 2024-01-29 PROCEDURE — 74018 RADEX ABDOMEN 1 VIEW: CPT

## 2024-01-29 PROCEDURE — 82962 GLUCOSE BLOOD TEST: CPT

## 2024-01-29 PROCEDURE — 2060000000 HC ICU INTERMEDIATE R&B

## 2024-01-29 PROCEDURE — 94761 N-INVAS EAR/PLS OXIMETRY MLT: CPT

## 2024-01-29 PROCEDURE — 83735 ASSAY OF MAGNESIUM: CPT

## 2024-01-29 PROCEDURE — 6360000002 HC RX W HCPCS: Performed by: INTERNAL MEDICINE

## 2024-01-29 PROCEDURE — 2500000003 HC RX 250 WO HCPCS: Performed by: INTERNAL MEDICINE

## 2024-01-29 PROCEDURE — 2580000003 HC RX 258: Performed by: INTERNAL MEDICINE

## 2024-01-29 PROCEDURE — A4216 STERILE WATER/SALINE, 10 ML: HCPCS | Performed by: INTERNAL MEDICINE

## 2024-01-29 PROCEDURE — 85025 COMPLETE CBC W/AUTO DIFF WBC: CPT

## 2024-01-29 PROCEDURE — 80048 BASIC METABOLIC PNL TOTAL CA: CPT

## 2024-01-29 PROCEDURE — 36415 COLL VENOUS BLD VENIPUNCTURE: CPT

## 2024-01-29 PROCEDURE — 85520 HEPARIN ASSAY: CPT

## 2024-01-29 RX ORDER — POTASSIUM CHLORIDE 7.45 MG/ML
10 INJECTION INTRAVENOUS
Status: COMPLETED | OUTPATIENT
Start: 2024-01-29 | End: 2024-01-29

## 2024-01-29 RX ORDER — POTASSIUM CHLORIDE 7.45 MG/ML
10 INJECTION INTRAVENOUS
Status: DISPENSED | OUTPATIENT
Start: 2024-01-29 | End: 2024-01-29

## 2024-01-29 RX ADMIN — POTASSIUM CHLORIDE 10 MEQ: 7.46 INJECTION, SOLUTION INTRAVENOUS at 10:22

## 2024-01-29 RX ADMIN — POTASSIUM CHLORIDE 10 MEQ: 7.46 INJECTION, SOLUTION INTRAVENOUS at 08:11

## 2024-01-29 RX ADMIN — LABETALOL HYDROCHLORIDE 10 MG: 5 INJECTION, SOLUTION INTRAVENOUS at 10:35

## 2024-01-29 RX ADMIN — FAMOTIDINE 20 MG: 10 INJECTION, SOLUTION INTRAVENOUS at 08:09

## 2024-01-29 RX ADMIN — LORAZEPAM 0.5 MG: 2 INJECTION INTRAMUSCULAR; INTRAVENOUS at 02:46

## 2024-01-29 RX ADMIN — SODIUM CHLORIDE 5 MG/HR: 900 INJECTION, SOLUTION INTRAVENOUS at 08:31

## 2024-01-29 RX ADMIN — HEPARIN SODIUM AND DEXTROSE 10 UNITS/KG/HR: 10000; 5 INJECTION INTRAVENOUS at 11:10

## 2024-01-29 RX ADMIN — DEXTROSE MONOHYDRATE: 50 INJECTION, SOLUTION INTRAVENOUS at 12:24

## 2024-01-29 RX ADMIN — SODIUM CHLORIDE, PRESERVATIVE FREE 10 ML: 5 INJECTION INTRAVENOUS at 10:17

## 2024-01-29 NOTE — CARE COORDINATION
Patient with low readmission risk score of 13%.  No anticipated CM needs.  Please consult CM should any discharge needs arise.  IVY Figueroa

## 2024-01-30 ENCOUNTER — APPOINTMENT (OUTPATIENT)
Facility: HOSPITAL | Age: 69
DRG: 335 | End: 2024-01-30
Attending: INTERNAL MEDICINE
Payer: MEDICARE

## 2024-01-30 LAB
ANION GAP SERPL CALC-SCNC: 6 MMOL/L (ref 5–15)
BASOPHILS # BLD: 0 K/UL (ref 0–0.1)
BASOPHILS NFR BLD: 0 % (ref 0–1)
BUN SERPL-MCNC: 54 MG/DL (ref 6–20)
BUN/CREAT SERPL: 15 (ref 12–20)
CALCIUM SERPL-MCNC: 8.4 MG/DL (ref 8.5–10.1)
CHLORIDE SERPL-SCNC: 96 MMOL/L (ref 97–108)
CO2 SERPL-SCNC: 34 MMOL/L (ref 21–32)
CREAT SERPL-MCNC: 3.53 MG/DL (ref 0.7–1.3)
DIFFERENTIAL METHOD BLD: ABNORMAL
EOSINOPHIL # BLD: 0 K/UL (ref 0–0.4)
EOSINOPHIL NFR BLD: 0 % (ref 0–7)
ERYTHROCYTE [DISTWIDTH] IN BLOOD BY AUTOMATED COUNT: 13.9 % (ref 11.5–14.5)
GLUCOSE BLD STRIP.AUTO-MCNC: 119 MG/DL (ref 65–117)
GLUCOSE BLD STRIP.AUTO-MCNC: 122 MG/DL (ref 65–117)
GLUCOSE BLD STRIP.AUTO-MCNC: 127 MG/DL (ref 65–117)
GLUCOSE BLD STRIP.AUTO-MCNC: 137 MG/DL (ref 65–117)
GLUCOSE SERPL-MCNC: 150 MG/DL (ref 65–100)
HCT VFR BLD AUTO: 39.9 % (ref 36.6–50.3)
HGB BLD-MCNC: 12.9 G/DL (ref 12.1–17)
IMM GRANULOCYTES # BLD AUTO: 0.1 K/UL (ref 0–0.04)
IMM GRANULOCYTES NFR BLD AUTO: 1 % (ref 0–0.5)
LYMPHOCYTES # BLD: 0.7 K/UL (ref 0.8–3.5)
LYMPHOCYTES NFR BLD: 7 % (ref 12–49)
MAGNESIUM SERPL-MCNC: 2.2 MG/DL (ref 1.6–2.4)
MCH RBC QN AUTO: 28.8 PG (ref 26–34)
MCHC RBC AUTO-ENTMCNC: 32.3 G/DL (ref 30–36.5)
MCV RBC AUTO: 89.1 FL (ref 80–99)
MONOCYTES # BLD: 1.3 K/UL (ref 0–1)
MONOCYTES NFR BLD: 12 % (ref 5–13)
NEUTS SEG # BLD: 8.3 K/UL (ref 1.8–8)
NEUTS SEG NFR BLD: 80 % (ref 32–75)
NRBC # BLD: 0 K/UL (ref 0–0.01)
NRBC BLD-RTO: 0 PER 100 WBC
PLATELET # BLD AUTO: 166 K/UL (ref 150–400)
PMV BLD AUTO: 9.4 FL (ref 8.9–12.9)
POTASSIUM SERPL-SCNC: 2.9 MMOL/L (ref 3.5–5.1)
POTASSIUM SERPL-SCNC: 3.4 MMOL/L (ref 3.5–5.1)
RBC # BLD AUTO: 4.48 M/UL (ref 4.1–5.7)
SERVICE CMNT-IMP: ABNORMAL
SODIUM SERPL-SCNC: 136 MMOL/L (ref 136–145)
UFH PPP CHRO-ACNC: 0.12 IU/ML
UFH PPP CHRO-ACNC: 0.29 IU/ML
WBC # BLD AUTO: 10.4 K/UL (ref 4.1–11.1)

## 2024-01-30 PROCEDURE — 84132 ASSAY OF SERUM POTASSIUM: CPT

## 2024-01-30 PROCEDURE — 6370000000 HC RX 637 (ALT 250 FOR IP): Performed by: INTERNAL MEDICINE

## 2024-01-30 PROCEDURE — 82962 GLUCOSE BLOOD TEST: CPT

## 2024-01-30 PROCEDURE — 2700000000 HC OXYGEN THERAPY PER DAY

## 2024-01-30 PROCEDURE — 2580000003 HC RX 258: Performed by: INTERNAL MEDICINE

## 2024-01-30 PROCEDURE — 85520 HEPARIN ASSAY: CPT

## 2024-01-30 PROCEDURE — 85025 COMPLETE CBC W/AUTO DIFF WBC: CPT

## 2024-01-30 PROCEDURE — 6360000002 HC RX W HCPCS: Performed by: NURSE PRACTITIONER

## 2024-01-30 PROCEDURE — 83735 ASSAY OF MAGNESIUM: CPT

## 2024-01-30 PROCEDURE — 2500000003 HC RX 250 WO HCPCS: Performed by: INTERNAL MEDICINE

## 2024-01-30 PROCEDURE — 80048 BASIC METABOLIC PNL TOTAL CA: CPT

## 2024-01-30 PROCEDURE — 6360000002 HC RX W HCPCS: Performed by: INTERNAL MEDICINE

## 2024-01-30 PROCEDURE — A4216 STERILE WATER/SALINE, 10 ML: HCPCS | Performed by: INTERNAL MEDICINE

## 2024-01-30 PROCEDURE — 2060000000 HC ICU INTERMEDIATE R&B

## 2024-01-30 PROCEDURE — 70450 CT HEAD/BRAIN W/O DYE: CPT

## 2024-01-30 PROCEDURE — 74176 CT ABD & PELVIS W/O CONTRAST: CPT

## 2024-01-30 PROCEDURE — 94761 N-INVAS EAR/PLS OXIMETRY MLT: CPT

## 2024-01-30 PROCEDURE — 36415 COLL VENOUS BLD VENIPUNCTURE: CPT

## 2024-01-30 RX ORDER — POTASSIUM CHLORIDE 7.45 MG/ML
10 INJECTION INTRAVENOUS
Status: COMPLETED | OUTPATIENT
Start: 2024-01-30 | End: 2024-01-30

## 2024-01-30 RX ORDER — DEXTROSE MONOHYDRATE, SODIUM CHLORIDE, AND POTASSIUM CHLORIDE 50; 2.98; 4.5 G/1000ML; G/1000ML; G/1000ML
INJECTION, SOLUTION INTRAVENOUS CONTINUOUS
Status: DISCONTINUED | OUTPATIENT
Start: 2024-01-30 | End: 2024-01-31

## 2024-01-30 RX ORDER — POTASSIUM CHLORIDE 7.45 MG/ML
10 INJECTION INTRAVENOUS
Status: DISCONTINUED | OUTPATIENT
Start: 2024-01-30 | End: 2024-01-30

## 2024-01-30 RX ADMIN — FAMOTIDINE 20 MG: 10 INJECTION, SOLUTION INTRAVENOUS at 08:56

## 2024-01-30 RX ADMIN — POTASSIUM CHLORIDE 10 MEQ: 7.45 INJECTION INTRAVENOUS at 06:47

## 2024-01-30 RX ADMIN — SODIUM CHLORIDE 25 ML/HR: 9 INJECTION, SOLUTION INTRAVENOUS at 06:44

## 2024-01-30 RX ADMIN — HEPARIN SODIUM 2000 UNITS: 1000 INJECTION INTRAVENOUS; SUBCUTANEOUS at 15:02

## 2024-01-30 RX ADMIN — DEXTROSE MONOHYDRATE: 50 INJECTION, SOLUTION INTRAVENOUS at 07:00

## 2024-01-30 RX ADMIN — SODIUM CHLORIDE 5 MG/HR: 900 INJECTION, SOLUTION INTRAVENOUS at 21:38

## 2024-01-30 RX ADMIN — POTASSIUM CHLORIDE 10 MEQ: 7.45 INJECTION INTRAVENOUS at 14:15

## 2024-01-30 RX ADMIN — POTASSIUM CHLORIDE 10 MEQ: 7.45 INJECTION INTRAVENOUS at 10:28

## 2024-01-30 RX ADMIN — HEPARIN SODIUM 2000 UNITS: 1000 INJECTION INTRAVENOUS; SUBCUTANEOUS at 05:20

## 2024-01-30 RX ADMIN — ONDANSETRON 4 MG: 2 INJECTION INTRAMUSCULAR; INTRAVENOUS at 13:01

## 2024-01-30 RX ADMIN — SODIUM CHLORIDE, PRESERVATIVE FREE 10 ML: 5 INJECTION INTRAVENOUS at 21:42

## 2024-01-30 RX ADMIN — POTASSIUM CHLORIDE, DEXTROSE MONOHYDRATE AND SODIUM CHLORIDE: 300; 5; 450 INJECTION, SOLUTION INTRAVENOUS at 14:16

## 2024-01-30 RX ADMIN — POTASSIUM CHLORIDE 10 MEQ: 7.45 INJECTION INTRAVENOUS at 11:31

## 2024-01-30 RX ADMIN — MORPHINE SULFATE 2 MG: 2 INJECTION, SOLUTION INTRAMUSCULAR; INTRAVENOUS at 20:43

## 2024-01-30 RX ADMIN — HEPARIN SODIUM AND DEXTROSE 14 UNITS/KG/HR: 10000; 5 INJECTION INTRAVENOUS at 15:02

## 2024-01-30 RX ADMIN — SODIUM CHLORIDE 5 MG/HR: 900 INJECTION, SOLUTION INTRAVENOUS at 05:24

## 2024-01-30 RX ADMIN — MORPHINE SULFATE 2 MG: 2 INJECTION, SOLUTION INTRAMUSCULAR; INTRAVENOUS at 12:46

## 2024-01-30 RX ADMIN — LABETALOL HYDROCHLORIDE 10 MG: 5 INJECTION, SOLUTION INTRAVENOUS at 19:48

## 2024-01-30 RX ADMIN — POTASSIUM CHLORIDE 10 MEQ: 7.45 INJECTION INTRAVENOUS at 08:53

## 2024-01-30 RX ADMIN — ACETAMINOPHEN 650 MG: 650 SUPPOSITORY RECTAL at 00:44

## 2024-01-30 RX ADMIN — POTASSIUM CHLORIDE 10 MEQ: 7.45 INJECTION INTRAVENOUS at 12:47

## 2024-01-30 NOTE — CODE DOCUMENTATION
Inpatient Code Stroke Summary    I responded to bedside for called Code Stroke. I personally discussed patient status change with bedside nurse, reviewed vitals, medical history, reason for admission and current medications. I personally evaluated the patient. Based on these data, Code Stroke Protocol was Discontinued        00:48: Responded to Code Stroke, arrived to room to find nursing and RRT present. Nursing found pt to have a weak left upper extremity on signout, Pt was utilizing this earlier and now has decrease use. Pt seen at bedside, alert and awake, denies any complaints. Exam did reveal that his left upper extremity had a  but weaker than right, pt was hesistant to lift extremity, upon further exam, pt able to lift arm but was painful and did not want to hold against gravity. Brisk refill, strong radial pulse,  no noted swelling or deformity to extremity noted at this time. Pt alert, awake in NAD, Pt is currently on heparin gtt,  Decision to obtain HCT , spoke with oncall tele Neuro Dr. Jocelyn Villela. Agrees with Dry CT , no further studies unless abnormalities identified. Nsg aware of plan.       00:18 01/30/2024  IMPRESSION: HCT  No acute intracranial process. Chronic sinus disease.        Lydia Reno, APRN - NP

## 2024-01-30 NOTE — SIGNIFICANT EVENT
Reviewed Xray results post repositioning of tube, per xray, in good placement, called floor and notified staff of reading. OK to use.

## 2024-01-31 ENCOUNTER — ANESTHESIA (OUTPATIENT)
Facility: HOSPITAL | Age: 69
End: 2024-01-31
Payer: MEDICARE

## 2024-01-31 ENCOUNTER — APPOINTMENT (OUTPATIENT)
Facility: HOSPITAL | Age: 69
DRG: 335 | End: 2024-01-31
Attending: INTERNAL MEDICINE
Payer: MEDICARE

## 2024-01-31 ENCOUNTER — ANESTHESIA EVENT (OUTPATIENT)
Facility: HOSPITAL | Age: 69
End: 2024-01-31
Payer: MEDICARE

## 2024-01-31 PROBLEM — N17.9 AKI (ACUTE KIDNEY INJURY) (HCC): Status: ACTIVE | Noted: 2024-01-31

## 2024-01-31 PROBLEM — I48.19 PERSISTENT ATRIAL FIBRILLATION (HCC): Status: ACTIVE | Noted: 2024-01-31

## 2024-01-31 PROBLEM — M35.3 POLYMYALGIA RHEUMATICA (HCC): Status: ACTIVE | Noted: 2023-04-25

## 2024-01-31 PROBLEM — N18.30 CKD (CHRONIC KIDNEY DISEASE) STAGE 3, GFR 30-59 ML/MIN (HCC): Status: ACTIVE | Noted: 2024-01-31

## 2024-01-31 PROBLEM — I10 PRIMARY HYPERTENSION: Status: ACTIVE | Noted: 2023-04-25

## 2024-01-31 PROBLEM — E78.00 PURE HYPERCHOLESTEROLEMIA: Status: ACTIVE | Noted: 2023-04-25

## 2024-01-31 LAB
ABO + RH BLD: NORMAL
BLOOD GROUP ANTIBODIES SERPL: NORMAL
GLUCOSE BLD STRIP.AUTO-MCNC: 143 MG/DL (ref 65–117)
SERVICE CMNT-IMP: ABNORMAL
SPECIMEN EXP DATE BLD: NORMAL
UFH PPP CHRO-ACNC: 0.41 IU/ML

## 2024-01-31 PROCEDURE — 6360000002 HC RX W HCPCS: Performed by: INTERNAL MEDICINE

## 2024-01-31 PROCEDURE — 7100000000 HC PACU RECOVERY - FIRST 15 MIN: Performed by: SURGERY

## 2024-01-31 PROCEDURE — 3700000001 HC ADD 15 MINUTES (ANESTHESIA): Performed by: SURGERY

## 2024-01-31 PROCEDURE — 2580000003 HC RX 258: Performed by: INTERNAL MEDICINE

## 2024-01-31 PROCEDURE — 2720000010 HC SURG SUPPLY STERILE: Performed by: SURGERY

## 2024-01-31 PROCEDURE — 86900 BLOOD TYPING SEROLOGIC ABO: CPT

## 2024-01-31 PROCEDURE — 6360000002 HC RX W HCPCS: Performed by: SURGERY

## 2024-01-31 PROCEDURE — 0DTJ0ZZ RESECTION OF APPENDIX, OPEN APPROACH: ICD-10-PCS | Performed by: STUDENT IN AN ORGANIZED HEALTH CARE EDUCATION/TRAINING PROGRAM

## 2024-01-31 PROCEDURE — 6360000002 HC RX W HCPCS: Performed by: NURSE ANESTHETIST, CERTIFIED REGISTERED

## 2024-01-31 PROCEDURE — 2580000003 HC RX 258: Performed by: ANESTHESIOLOGY

## 2024-01-31 PROCEDURE — C1765 ADHESION BARRIER: HCPCS | Performed by: SURGERY

## 2024-01-31 PROCEDURE — 2500000003 HC RX 250 WO HCPCS: Performed by: NURSE ANESTHETIST, CERTIFIED REGISTERED

## 2024-01-31 PROCEDURE — 86901 BLOOD TYPING SEROLOGIC RH(D): CPT

## 2024-01-31 PROCEDURE — 0DN80ZZ RELEASE SMALL INTESTINE, OPEN APPROACH: ICD-10-PCS | Performed by: SURGERY

## 2024-01-31 PROCEDURE — 7100000001 HC PACU RECOVERY - ADDTL 15 MIN: Performed by: SURGERY

## 2024-01-31 PROCEDURE — 82962 GLUCOSE BLOOD TEST: CPT

## 2024-01-31 PROCEDURE — 2709999900 HC NON-CHARGEABLE SUPPLY: Performed by: SURGERY

## 2024-01-31 PROCEDURE — 88305 TISSUE EXAM BY PATHOLOGIST: CPT

## 2024-01-31 PROCEDURE — 6360000002 HC RX W HCPCS: Performed by: ANESTHESIOLOGY

## 2024-01-31 PROCEDURE — 3700000000 HC ANESTHESIA ATTENDED CARE: Performed by: SURGERY

## 2024-01-31 PROCEDURE — C9290 INJ, BUPIVACAINE LIPOSOME: HCPCS | Performed by: SURGERY

## 2024-01-31 PROCEDURE — 2060000000 HC ICU INTERMEDIATE R&B

## 2024-01-31 PROCEDURE — 0DNU0ZZ RELEASE OMENTUM, OPEN APPROACH: ICD-10-PCS | Performed by: SURGERY

## 2024-01-31 PROCEDURE — 3600000014 HC SURGERY LEVEL 4 ADDTL 15MIN: Performed by: SURGERY

## 2024-01-31 PROCEDURE — 88304 TISSUE EXAM BY PATHOLOGIST: CPT

## 2024-01-31 PROCEDURE — 3600000004 HC SURGERY LEVEL 4 BASE: Performed by: SURGERY

## 2024-01-31 PROCEDURE — 86850 RBC ANTIBODY SCREEN: CPT

## 2024-01-31 PROCEDURE — 71045 X-RAY EXAM CHEST 1 VIEW: CPT

## 2024-01-31 PROCEDURE — 36415 COLL VENOUS BLD VENIPUNCTURE: CPT

## 2024-01-31 DEVICE — PACK PROC HALF SHT W3XL5IN SOD HYALURONATE: Type: IMPLANTABLE DEVICE | Site: ABDOMEN | Status: FUNCTIONAL

## 2024-01-31 RX ORDER — DIPHENHYDRAMINE HYDROCHLORIDE 50 MG/ML
12.5 INJECTION INTRAMUSCULAR; INTRAVENOUS
Status: DISCONTINUED | OUTPATIENT
Start: 2024-01-31 | End: 2024-01-31 | Stop reason: HOSPADM

## 2024-01-31 RX ORDER — SUCCINYLCHOLINE/SOD CL,ISO/PF 100 MG/5ML
SYRINGE (ML) INTRAVENOUS PRN
Status: DISCONTINUED | OUTPATIENT
Start: 2024-01-31 | End: 2024-01-31 | Stop reason: SDUPTHER

## 2024-01-31 RX ORDER — ONDANSETRON 2 MG/ML
4 INJECTION INTRAMUSCULAR; INTRAVENOUS
Status: DISCONTINUED | OUTPATIENT
Start: 2024-01-31 | End: 2024-01-31 | Stop reason: HOSPADM

## 2024-01-31 RX ORDER — DROPERIDOL 2.5 MG/ML
0.62 INJECTION, SOLUTION INTRAMUSCULAR; INTRAVENOUS
Status: DISCONTINUED | OUTPATIENT
Start: 2024-01-31 | End: 2024-01-31 | Stop reason: HOSPADM

## 2024-01-31 RX ORDER — ROCURONIUM BROMIDE 10 MG/ML
INJECTION, SOLUTION INTRAVENOUS PRN
Status: DISCONTINUED | OUTPATIENT
Start: 2024-01-31 | End: 2024-01-31 | Stop reason: SDUPTHER

## 2024-01-31 RX ORDER — MEPERIDINE HYDROCHLORIDE 25 MG/ML
12.5 INJECTION INTRAMUSCULAR; INTRAVENOUS; SUBCUTANEOUS EVERY 5 MIN PRN
Status: DISCONTINUED | OUTPATIENT
Start: 2024-01-31 | End: 2024-01-31 | Stop reason: HOSPADM

## 2024-01-31 RX ORDER — DEXAMETHASONE SODIUM PHOSPHATE 4 MG/ML
INJECTION, SOLUTION INTRA-ARTICULAR; INTRALESIONAL; INTRAMUSCULAR; INTRAVENOUS; SOFT TISSUE PRN
Status: DISCONTINUED | OUTPATIENT
Start: 2024-01-31 | End: 2024-01-31 | Stop reason: SDUPTHER

## 2024-01-31 RX ORDER — PROPOFOL 10 MG/ML
INJECTION, EMULSION INTRAVENOUS PRN
Status: DISCONTINUED | OUTPATIENT
Start: 2024-01-31 | End: 2024-01-31 | Stop reason: SDUPTHER

## 2024-01-31 RX ORDER — SODIUM CHLORIDE, SODIUM LACTATE, POTASSIUM CHLORIDE, CALCIUM CHLORIDE 600; 310; 30; 20 MG/100ML; MG/100ML; MG/100ML; MG/100ML
INJECTION, SOLUTION INTRAVENOUS CONTINUOUS
Status: DISCONTINUED | OUTPATIENT
Start: 2024-01-31 | End: 2024-01-31 | Stop reason: HOSPADM

## 2024-01-31 RX ORDER — ONDANSETRON 2 MG/ML
INJECTION INTRAMUSCULAR; INTRAVENOUS PRN
Status: DISCONTINUED | OUTPATIENT
Start: 2024-01-31 | End: 2024-01-31 | Stop reason: SDUPTHER

## 2024-01-31 RX ORDER — HYDROMORPHONE HYDROCHLORIDE 1 MG/ML
1 INJECTION, SOLUTION INTRAMUSCULAR; INTRAVENOUS; SUBCUTANEOUS
Status: DISCONTINUED | OUTPATIENT
Start: 2024-01-31 | End: 2024-02-12 | Stop reason: HOSPADM

## 2024-01-31 RX ORDER — SODIUM CHLORIDE, SODIUM LACTATE, POTASSIUM CHLORIDE, CALCIUM CHLORIDE 600; 310; 30; 20 MG/100ML; MG/100ML; MG/100ML; MG/100ML
INJECTION, SOLUTION INTRAVENOUS CONTINUOUS
Status: DISCONTINUED | OUTPATIENT
Start: 2024-01-31 | End: 2024-02-03

## 2024-01-31 RX ORDER — LABETALOL HYDROCHLORIDE 5 MG/ML
10 INJECTION, SOLUTION INTRAVENOUS
Status: DISCONTINUED | OUTPATIENT
Start: 2024-01-31 | End: 2024-01-31 | Stop reason: HOSPADM

## 2024-01-31 RX ORDER — FENTANYL CITRATE 50 UG/ML
INJECTION, SOLUTION INTRAMUSCULAR; INTRAVENOUS PRN
Status: DISCONTINUED | OUTPATIENT
Start: 2024-01-31 | End: 2024-01-31 | Stop reason: SDUPTHER

## 2024-01-31 RX ADMIN — MORPHINE SULFATE 2 MG: 2 INJECTION, SOLUTION INTRAMUSCULAR; INTRAVENOUS at 16:05

## 2024-01-31 RX ADMIN — FENTANYL CITRATE 50 MCG: 50 INJECTION, SOLUTION INTRAMUSCULAR; INTRAVENOUS at 10:07

## 2024-01-31 RX ADMIN — ROCURONIUM BROMIDE 10 MG: 10 INJECTION INTRAVENOUS at 12:17

## 2024-01-31 RX ADMIN — Medication 100 MG: at 10:09

## 2024-01-31 RX ADMIN — PIPERACILLIN AND TAZOBACTAM 4500 MG: 4; .5 INJECTION, POWDER, FOR SOLUTION INTRAVENOUS at 10:28

## 2024-01-31 RX ADMIN — FENTANYL CITRATE 50 MCG: 50 INJECTION, SOLUTION INTRAMUSCULAR; INTRAVENOUS at 13:01

## 2024-01-31 RX ADMIN — SODIUM CHLORIDE, POTASSIUM CHLORIDE, SODIUM LACTATE AND CALCIUM CHLORIDE: 600; 310; 30; 20 INJECTION, SOLUTION INTRAVENOUS at 09:36

## 2024-01-31 RX ADMIN — ROCURONIUM BROMIDE 10 MG: 10 INJECTION INTRAVENOUS at 11:57

## 2024-01-31 RX ADMIN — PROPOFOL 200 MG: 10 INJECTION, EMULSION INTRAVENOUS at 10:09

## 2024-01-31 RX ADMIN — FENTANYL CITRATE 50 MCG: 50 INJECTION, SOLUTION INTRAMUSCULAR; INTRAVENOUS at 10:32

## 2024-01-31 RX ADMIN — SODIUM CHLORIDE, POTASSIUM CHLORIDE, SODIUM LACTATE AND CALCIUM CHLORIDE: 600; 310; 30; 20 INJECTION, SOLUTION INTRAVENOUS at 23:33

## 2024-01-31 RX ADMIN — HYDROMORPHONE HYDROCHLORIDE 0.5 MG: 1 INJECTION, SOLUTION INTRAMUSCULAR; INTRAVENOUS; SUBCUTANEOUS at 23:39

## 2024-01-31 RX ADMIN — HYDROMORPHONE HYDROCHLORIDE 0.5 MG: 1 INJECTION, SOLUTION INTRAMUSCULAR; INTRAVENOUS; SUBCUTANEOUS at 13:53

## 2024-01-31 RX ADMIN — PIPERACILLIN AND TAZOBACTAM 3375 MG: 3; .375 INJECTION, POWDER, FOR SOLUTION INTRAVENOUS at 16:03

## 2024-01-31 RX ADMIN — ROCURONIUM BROMIDE 20 MG: 10 INJECTION INTRAVENOUS at 10:55

## 2024-01-31 RX ADMIN — SODIUM CHLORIDE, POTASSIUM CHLORIDE, SODIUM LACTATE AND CALCIUM CHLORIDE: 600; 310; 30; 20 INJECTION, SOLUTION INTRAVENOUS at 12:29

## 2024-01-31 RX ADMIN — DEXAMETHASONE SODIUM PHOSPHATE 8 MG: 4 INJECTION, SOLUTION INTRAMUSCULAR; INTRAVENOUS at 10:29

## 2024-01-31 RX ADMIN — FENTANYL CITRATE 50 MCG: 50 INJECTION, SOLUTION INTRAMUSCULAR; INTRAVENOUS at 13:02

## 2024-01-31 RX ADMIN — SODIUM CHLORIDE, POTASSIUM CHLORIDE, SODIUM LACTATE AND CALCIUM CHLORIDE: 600; 310; 30; 20 INJECTION, SOLUTION INTRAVENOUS at 14:33

## 2024-01-31 RX ADMIN — ONDANSETRON 4 MG: 2 INJECTION INTRAMUSCULAR; INTRAVENOUS at 12:35

## 2024-01-31 RX ADMIN — ROCURONIUM BROMIDE 10 MG: 10 INJECTION INTRAVENOUS at 10:30

## 2024-01-31 RX ADMIN — PIPERACILLIN AND TAZOBACTAM 3375 MG: 3; .375 INJECTION, POWDER, FOR SOLUTION INTRAVENOUS at 23:39

## 2024-01-31 RX ADMIN — ROCURONIUM BROMIDE 10 MG: 10 INJECTION INTRAVENOUS at 11:37

## 2024-01-31 RX ADMIN — ROCURONIUM BROMIDE 40 MG: 10 INJECTION INTRAVENOUS at 10:14

## 2024-01-31 RX ADMIN — SODIUM CHLORIDE, PRESERVATIVE FREE 10 ML: 5 INJECTION INTRAVENOUS at 21:01

## 2024-01-31 RX ADMIN — FENTANYL CITRATE 50 MCG: 50 INJECTION, SOLUTION INTRAMUSCULAR; INTRAVENOUS at 10:08

## 2024-01-31 RX ADMIN — SODIUM CHLORIDE, POTASSIUM CHLORIDE, SODIUM LACTATE AND CALCIUM CHLORIDE: 600; 310; 30; 20 INJECTION, SOLUTION INTRAVENOUS at 10:59

## 2024-01-31 RX ADMIN — SUGAMMADEX 200 MG: 100 INJECTION, SOLUTION INTRAVENOUS at 12:54

## 2024-01-31 NOTE — ANESTHESIA PRE PROCEDURE
Department of Anesthesiology  Preprocedure Note       Name:  Tim Morales Sr.   Age:  68 y.o.  :  1955                                          MRN:  [id number]         Date:  2024      Surgeon: Surgeon(s):  Tommy Tomas MD    Procedure: Procedure(s):  LAPAROTOMY EXPLORATORY, LYSIS OF ADHENNSIONS    Medications prior to admission:   Prior to Admission medications    Medication Sig Start Date End Date Taking? Authorizing Provider   aspirin 325 MG tablet Take 1 tablet by mouth daily    Provider, MD Ulises   amLODIPine (NORVASC) 10 MG tablet ceived the following from [facility]: Outside name: amLODIPine (NORVASC) 10 mg tablet 21   Automatic Reconciliation, Ar   hydrALAZINE (APRESOLINE) 50 MG tablet 2 tablets 3/31/21   Automatic Reconciliation, Ar   magnesium oxide (MAG-OX) 400 MG tablet TAKE 1 TABLET BY MOUTH TWICE DAILY 20   Automatic Reconciliation, Ar   olmesartan (BENICAR) 40 MG tablet ceived the following from [facility]: Outside name: olmesartan (BENICAR) 40 mg tablet 21   Automatic Reconciliation, Ar   ondansetron (ZOFRAN-ODT) 4 MG disintegrating tablet Take 1 tablet by mouth as needed 3/22/21   Automatic Reconciliation, Ar   potassium chloride (KLOR-CON M) 20 MEQ extended release tablet TAKE 1 BY MOUTH THREE TIMES DAILY 20   Automatic Reconciliation, Ar   predniSONE (DELTASONE) 10 MG tablet ceived the following from [facility]: Outside name: predniSONE (DELTASONE) 10 mg tablet 21   Automatic Reconciliation, Ar   rosuvastatin (CRESTOR) 10 MG tablet ceived the following from [facility]: Outside name: rosuvastatin (CRESTOR) 10 mg tablet 21   Automatic Reconciliation, Ar       Current medications:    Current Facility-Administered Medications   Medication Dose Route Frequency Provider Last Rate Last Admin   • lactated ringers IV soln infusion   IntraVENous Continuous Alexx Santana DO

## 2024-01-31 NOTE — ADT AUTH CERT
kidney on previous study is difficult to visualize  STOMACH: NG tube positioned appropriately within the stomach  SMALL BOWEL: Small bowel distention is stable to minimally improved. There are  nondistended distal loops.  COLON: Few scattered diverticula  APPENDIX: Not enlarged  PERITONEUM: No ascites or pneumoperitoneum.  RETROPERITONEUM: No lymphadenopathy or aortic aneurysm.  REPRODUCTIVE ORGANS: Mildly enlarged  URINARY BLADDER: No mass or calculus.  BONES: No destructive bone lesion.  ABDOMINAL WALL: A single hernia containing fat  ADDITIONAL COMMENTS: N/A     Impression  1. Persistent small bowel obstruction stable to minimally improved           HD3 SBO  OR tomorrow. Hold heparin drip tomorrow morning @ 0400 in preparation for 1000 am case. Goal is to restart  @ 1800.   All questions answered.      Tommy Tomas MD              
Voiding well         Current Facility-Administered Medications   Medication Dose Route Frequency    potassium chloride 10 mEq/100 mL IVPB (Peripheral Line)  10 mEq IntraVENous Q1H    famotidine (PEPCID) 20 mg in sodium chloride (PF) 0.9 % 10 mL injection  20 mg IntraVENous Daily    heparin (porcine) injection 4,000 Units  4,000 Units IntraVENous PRN    heparin (porcine) injection 2,000 Units  2,000 Units IntraVENous PRN    heparin 25,000 units in dextrose 5% 250 mL (premix) infusion  5-30 Units/kg/hr IntraVENous Continuous    dilTIAZem 100 mg in sodium chloride 0.9 % 100 mL infusion (ADD-Patterson)  5 mg/hr IntraVENous Continuous    dextrose 5 % solution   IntraVENous Continuous    [Held by provider] predniSONE (DELTASONE) tablet 10 mg  10 mg Oral Daily    labetalol (NORMODYNE;TRANDATE) injection 10 mg  10 mg IntraVENous Q8H PRN    LORazepam (ATIVAN) injection 0.5 mg  0.5 mg IntraVENous Q4H PRN    sodium chloride flush 0.9 % injection 5-40 mL  5-40 mL IntraVENous 2 times per day    sodium chloride flush 0.9 % injection 5-40 mL  5-40 mL IntraVENous PRN    0.9 % sodium chloride infusion   IntraVENous PRN    ondansetron (ZOFRAN-ODT) disintegrating tablet 4 mg  4 mg Oral Q8H PRN     Or    ondansetron (ZOFRAN) injection 4 mg  4 mg IntraVENous Q6H PRN    polyethylene glycol (GLYCOLAX) packet 17 g  17 g Oral Daily PRN    acetaminophen (TYLENOL) tablet 650 mg  650 mg Oral Q6H PRN     Or    acetaminophen (TYLENOL) suppository 650 mg  650 mg Rectal Q6H PRN    morphine (PF) injection 2 mg  2 mg IntraVENous Q6H PRN    lactated ringers IV soln infusion   IntraVENous Continuous    glucose chewable tablet 16 g  4 tablet Oral PRN    dextrose bolus 10% 125 mL  125 mL IntraVENous PRN     Or    dextrose bolus 10% 250 mL  250 mL IntraVENous PRN    glucagon injection 1 mg  1 mg SubCUTAneous PRN    dextrose 10 % infusion   IntraVENous Continuous PRN            Objective:      Vitals:  Blood pressure (!) 158/82, pulse 84, temperature 99.3

## 2024-01-31 NOTE — ANESTHESIA POSTPROCEDURE EVALUATION
Department of Anesthesiology  Postprocedure Note    Patient: Tim Morales Sr.  MRN: [id number]  YOB: 1955  Date of evaluation: 1/31/2024    Procedure Summary     Date: 01/31/24 Room / Location: [facility] MAIN OR F6 / [facility] MAIN OR    Anesthesia Start: 0959 Anesthesia Stop: 1316    Procedure: EXPLORATORY LAPAROTOMY, EXTENSIVE LYSIS OF ADHESIONS, APPENDECTOMY (Abdomen) Diagnosis:       Small bowel obstruction (HCC)      (Small bowel obstruction (HCC) [K56.609])    Surgeons: oTmmy Tomas MD Responsible Provider: Alexx Santana DO    Anesthesia Type: General ASA Status: 3 - Emergent          Anesthesia Type: General    Lamont Phase I: Lamont Score: 8    Lamont Phase II:      Anesthesia Post Evaluation    Patient location during evaluation: PACU  Patient participation: complete - patient participated  Level of consciousness: awake  Airway patency: patent  Nausea & Vomiting: no vomiting and no nausea  Cardiovascular status: hemodynamically stable  Respiratory status: acceptable  Hydration status: stable  Pain management: adequate        No notable events documented.

## 2024-01-31 NOTE — BRIEF OP NOTE
full thickness perineal and lower extremity burns in incontinent patients 01/31/24 1418   Site Assessment No urethral drainage 01/31/24 1418   Urine Color Yellow 01/31/24 1418   Urine Appearance Clear 01/31/24 1418   Collection Container Standard 01/31/24 1418   Securement Method Securing device (Describe) 01/31/24 1418   Catheter Best Practices  Drainage tube clipped to bed;Catheter secured to thigh;Tamper seal intact;Bag below bladder;Bag not on floor;Lack of dependent loop in tubing;Drainage bag less than half full 01/31/24 1418   Status Draining 01/31/24 1418       [REMOVED] NG/OG/NJ/NE Tube Nasogastric 12 fr Right nostril (Removed)   Surrounding Skin Clean, dry & intact 01/28/24 2320   Securement device Adhesive based de oliveira 01/28/24 2320   Status Suction-low intermittent 01/28/24 2320   Placement Verified X-Ray (Initial) 01/28/24 2320   NG/OG/NJ/NE External Measurement (cm) 60 cm 01/28/24 2320   Drainage Appearance Bile;Brown 01/28/24 2320   Output (mL) 600 ml 01/29/24 0532       Findings: Severe interloop small bowel/omental adhesive disease.      Electronically signed by Tommy Tomas MD on 1/31/2024 at 5:54 PM    [id number]

## 2024-02-01 ENCOUNTER — APPOINTMENT (OUTPATIENT)
Facility: HOSPITAL | Age: 69
DRG: 335 | End: 2024-02-01
Attending: INTERNAL MEDICINE
Payer: MEDICARE

## 2024-02-01 LAB
ANION GAP SERPL CALC-SCNC: 11 MMOL/L (ref 5–15)
BASOPHILS # BLD: 0 K/UL (ref 0–0.1)
BASOPHILS NFR BLD: 0 % (ref 0–1)
BUN SERPL-MCNC: 61 MG/DL (ref 6–20)
BUN/CREAT SERPL: 17 (ref 12–20)
CALCIUM SERPL-MCNC: 8.8 MG/DL (ref 8.5–10.1)
CHLORIDE SERPL-SCNC: 101 MMOL/L (ref 97–108)
CO2 SERPL-SCNC: 29 MMOL/L (ref 21–32)
CREAT SERPL-MCNC: 3.57 MG/DL (ref 0.7–1.3)
DIFFERENTIAL METHOD BLD: ABNORMAL
EOSINOPHIL # BLD: 0 K/UL (ref 0–0.4)
EOSINOPHIL NFR BLD: 0 % (ref 0–7)
ERYTHROCYTE [DISTWIDTH] IN BLOOD BY AUTOMATED COUNT: 14.6 % (ref 11.5–14.5)
GLUCOSE BLD STRIP.AUTO-MCNC: 134 MG/DL (ref 65–117)
GLUCOSE BLD STRIP.AUTO-MCNC: 168 MG/DL (ref 65–117)
GLUCOSE SERPL-MCNC: 175 MG/DL (ref 65–100)
HCT VFR BLD AUTO: 40.6 % (ref 36.6–50.3)
HGB BLD-MCNC: 13.1 G/DL (ref 12.1–17)
IMM GRANULOCYTES # BLD AUTO: 0 K/UL
IMM GRANULOCYTES NFR BLD AUTO: 0 %
LYMPHOCYTES # BLD: 0.5 K/UL (ref 0.8–3.5)
LYMPHOCYTES NFR BLD: 4 % (ref 12–49)
MCH RBC QN AUTO: 29.1 PG (ref 26–34)
MCHC RBC AUTO-ENTMCNC: 32.3 G/DL (ref 30–36.5)
MCV RBC AUTO: 90.2 FL (ref 80–99)
METAMYELOCYTES NFR BLD MANUAL: 1 %
MONOCYTES # BLD: 0.8 K/UL (ref 0–1)
MONOCYTES NFR BLD: 6 % (ref 5–13)
NEUTS BAND NFR BLD MANUAL: 2 % (ref 0–6)
NEUTS SEG # BLD: 11.1 K/UL (ref 1.8–8)
NEUTS SEG NFR BLD: 87 % (ref 32–75)
NRBC # BLD: 0 K/UL (ref 0–0.01)
NRBC BLD-RTO: 0 PER 100 WBC
O+P SPEC MICRO: NORMAL
O+P STL CONC: NORMAL
PLATELET # BLD AUTO: 208 K/UL (ref 150–400)
PMV BLD AUTO: 9.6 FL (ref 8.9–12.9)
POTASSIUM SERPL-SCNC: 4 MMOL/L (ref 3.5–5.1)
RBC # BLD AUTO: 4.5 M/UL (ref 4.1–5.7)
RBC MORPH BLD: ABNORMAL
SERVICE CMNT-IMP: ABNORMAL
SERVICE CMNT-IMP: ABNORMAL
SODIUM SERPL-SCNC: 141 MMOL/L (ref 136–145)
SPECIMEN SOURCE: NORMAL
UFH PPP CHRO-ACNC: <0.1 IU/ML
WBC # BLD AUTO: 12.5 K/UL (ref 4.1–11.1)

## 2024-02-01 PROCEDURE — A4216 STERILE WATER/SALINE, 10 ML: HCPCS | Performed by: INTERNAL MEDICINE

## 2024-02-01 PROCEDURE — 6360000002 HC RX W HCPCS: Performed by: INTERNAL MEDICINE

## 2024-02-01 PROCEDURE — 2060000000 HC ICU INTERMEDIATE R&B

## 2024-02-01 PROCEDURE — 82962 GLUCOSE BLOOD TEST: CPT

## 2024-02-01 PROCEDURE — 36415 COLL VENOUS BLD VENIPUNCTURE: CPT

## 2024-02-01 PROCEDURE — 74018 RADEX ABDOMEN 1 VIEW: CPT

## 2024-02-01 PROCEDURE — 2700000000 HC OXYGEN THERAPY PER DAY

## 2024-02-01 PROCEDURE — 85520 HEPARIN ASSAY: CPT

## 2024-02-01 PROCEDURE — 97165 OT EVAL LOW COMPLEX 30 MIN: CPT

## 2024-02-01 PROCEDURE — 2580000003 HC RX 258: Performed by: ANESTHESIOLOGY

## 2024-02-01 PROCEDURE — 94761 N-INVAS EAR/PLS OXIMETRY MLT: CPT

## 2024-02-01 PROCEDURE — 97535 SELF CARE MNGMENT TRAINING: CPT

## 2024-02-01 PROCEDURE — 97161 PT EVAL LOW COMPLEX 20 MIN: CPT

## 2024-02-01 PROCEDURE — 2500000003 HC RX 250 WO HCPCS: Performed by: INTERNAL MEDICINE

## 2024-02-01 PROCEDURE — 97530 THERAPEUTIC ACTIVITIES: CPT

## 2024-02-01 PROCEDURE — 2580000003 HC RX 258: Performed by: INTERNAL MEDICINE

## 2024-02-01 PROCEDURE — 80048 BASIC METABOLIC PNL TOTAL CA: CPT

## 2024-02-01 PROCEDURE — 97110 THERAPEUTIC EXERCISES: CPT

## 2024-02-01 PROCEDURE — 6360000002 HC RX W HCPCS: Performed by: SURGERY

## 2024-02-01 PROCEDURE — 85025 COMPLETE CBC W/AUTO DIFF WBC: CPT

## 2024-02-01 RX ORDER — HEPARIN SODIUM 10000 [USP'U]/100ML
5-30 INJECTION, SOLUTION INTRAVENOUS CONTINUOUS
Status: DISPENSED | OUTPATIENT
Start: 2024-02-01 | End: 2024-02-04

## 2024-02-01 RX ADMIN — SODIUM CHLORIDE, PRESERVATIVE FREE 10 ML: 5 INJECTION INTRAVENOUS at 08:23

## 2024-02-01 RX ADMIN — SODIUM CHLORIDE, POTASSIUM CHLORIDE, SODIUM LACTATE AND CALCIUM CHLORIDE: 600; 310; 30; 20 INJECTION, SOLUTION INTRAVENOUS at 08:06

## 2024-02-01 RX ADMIN — ONDANSETRON 4 MG: 2 INJECTION INTRAMUSCULAR; INTRAVENOUS at 01:58

## 2024-02-01 RX ADMIN — ONDANSETRON 4 MG: 2 INJECTION INTRAMUSCULAR; INTRAVENOUS at 08:06

## 2024-02-01 RX ADMIN — SODIUM CHLORIDE 5 MG/HR: 900 INJECTION, SOLUTION INTRAVENOUS at 10:13

## 2024-02-01 RX ADMIN — HEPARIN SODIUM 4000 UNITS: 1000 INJECTION INTRAVENOUS; SUBCUTANEOUS at 16:23

## 2024-02-01 RX ADMIN — SODIUM CHLORIDE, PRESERVATIVE FREE 10 ML: 5 INJECTION INTRAVENOUS at 21:04

## 2024-02-01 RX ADMIN — PIPERACILLIN AND TAZOBACTAM 3375 MG: 3; .375 INJECTION, POWDER, FOR SOLUTION INTRAVENOUS at 16:28

## 2024-02-01 RX ADMIN — HEPARIN SODIUM AND DEXTROSE 14 UNITS/KG/HR: 10000; 5 INJECTION INTRAVENOUS at 16:15

## 2024-02-01 RX ADMIN — SODIUM CHLORIDE, POTASSIUM CHLORIDE, SODIUM LACTATE AND CALCIUM CHLORIDE: 600; 310; 30; 20 INJECTION, SOLUTION INTRAVENOUS at 19:47

## 2024-02-01 RX ADMIN — PIPERACILLIN AND TAZOBACTAM 3375 MG: 3; .375 INJECTION, POWDER, FOR SOLUTION INTRAVENOUS at 08:17

## 2024-02-01 RX ADMIN — FAMOTIDINE 20 MG: 10 INJECTION, SOLUTION INTRAVENOUS at 08:06

## 2024-02-01 NOTE — CARE COORDINATION
CM Note:  General surgery following-pt is s/p exploratory lap for SBO on 1/31  Possible ileus, ngt, KUB  Nephrology following  Cardiology following--a-fib, pt on dilt gtt  IVY Figueroa

## 2024-02-01 NOTE — OP NOTE
bowel and the omentum from the anterior abdominal wall to get full access to the complete intraabdominal cavity.  After this was done, a wound protector was placed.  The bowel was run from the ligament of Treitz to the ileal gutter.  There was several small bowel serosal rents that were reapproximated with 3-0 skin sutures.  There was one full-thickness small bowel enterotomy in an area of chronic inflammation that was reapproximated with a single layer of 3-0 silk to reapproximate the mucosa and submucosa followed by imbricating 3-0 silk to cover the small bowel enterotomy with 2-0 silk suture.  After this was done, I milked the small bowel, cut it back to the ligament of Treitz in order for the NG tube to drain the contents.  Exparel expanded with 0.5% Marcaine plain was injected to the abdominal wall.  To the dense adhesions, I placed three applications of Seprafilm on the intraabdominal contents and retroperitoneum interfacing with the viscera.  The abdomen was irrigated with saline.  I left a small piece of Surgicel on a part of the distal ileal vascular mesentery to prevent bleeding.  Wound protector was discarded.  The fascia was closed with a running 0 PDS suture.  The skin was closed with 3-0 Vicryl subcutaneous tissue stitches and a 4-0 Monocryl running subcuticular stitch.  Binder was placed.  He tolerated the procedure well.      CASSIDY KAUR MD      [id number]  D:  01/31/2024 18:00  T:  02/01/2024 3:25  JOB #:  [id number]

## 2024-02-02 LAB
ANION GAP SERPL CALC-SCNC: 8 MMOL/L (ref 5–15)
BASOPHILS # BLD: 0 K/UL (ref 0–0.1)
BASOPHILS NFR BLD: 0 % (ref 0–1)
BUN SERPL-MCNC: 73 MG/DL (ref 6–20)
BUN/CREAT SERPL: 21 (ref 12–20)
CALCIUM SERPL-MCNC: 8.9 MG/DL (ref 8.5–10.1)
CHLORIDE SERPL-SCNC: 103 MMOL/L (ref 97–108)
CO2 SERPL-SCNC: 33 MMOL/L (ref 21–32)
CREAT SERPL-MCNC: 3.53 MG/DL (ref 0.7–1.3)
DIFFERENTIAL METHOD BLD: ABNORMAL
EOSINOPHIL # BLD: 0 K/UL (ref 0–0.4)
EOSINOPHIL NFR BLD: 0 % (ref 0–7)
ERYTHROCYTE [DISTWIDTH] IN BLOOD BY AUTOMATED COUNT: 14.7 % (ref 11.5–14.5)
GLUCOSE BLD STRIP.AUTO-MCNC: 155 MG/DL (ref 65–117)
GLUCOSE BLD STRIP.AUTO-MCNC: 75 MG/DL (ref 65–117)
GLUCOSE BLD STRIP.AUTO-MCNC: 88 MG/DL (ref 65–117)
GLUCOSE BLD STRIP.AUTO-MCNC: 91 MG/DL (ref 65–117)
GLUCOSE BLD STRIP.AUTO-MCNC: 99 MG/DL (ref 65–117)
GLUCOSE BLD STRIP.AUTO-MCNC: 99 MG/DL (ref 65–117)
GLUCOSE SERPL-MCNC: 115 MG/DL (ref 65–100)
HCT VFR BLD AUTO: 36.7 % (ref 36.6–50.3)
HGB BLD-MCNC: 12.1 G/DL (ref 12.1–17)
IMM GRANULOCYTES # BLD AUTO: 0.1 K/UL (ref 0–0.04)
IMM GRANULOCYTES NFR BLD AUTO: 1 % (ref 0–0.5)
LYMPHOCYTES # BLD: 0.7 K/UL (ref 0.8–3.5)
LYMPHOCYTES NFR BLD: 6 % (ref 12–49)
MCH RBC QN AUTO: 29.4 PG (ref 26–34)
MCHC RBC AUTO-ENTMCNC: 33 G/DL (ref 30–36.5)
MCV RBC AUTO: 89.1 FL (ref 80–99)
MONOCYTES # BLD: 1.1 K/UL (ref 0–1)
MONOCYTES NFR BLD: 10 % (ref 5–13)
NEUTS SEG # BLD: 9.5 K/UL (ref 1.8–8)
NEUTS SEG NFR BLD: 83 % (ref 32–75)
NRBC # BLD: 0 K/UL (ref 0–0.01)
NRBC BLD-RTO: 0 PER 100 WBC
PLATELET # BLD AUTO: 195 K/UL (ref 150–400)
PMV BLD AUTO: 9.4 FL (ref 8.9–12.9)
POTASSIUM SERPL-SCNC: 3.6 MMOL/L (ref 3.5–5.1)
RBC # BLD AUTO: 4.12 M/UL (ref 4.1–5.7)
SERVICE CMNT-IMP: ABNORMAL
SERVICE CMNT-IMP: NORMAL
SODIUM SERPL-SCNC: 144 MMOL/L (ref 136–145)
UFH PPP CHRO-ACNC: 0.28 IU/ML
UFH PPP CHRO-ACNC: 0.31 IU/ML
UFH PPP CHRO-ACNC: 0.45 IU/ML
UFH PPP CHRO-ACNC: 0.47 IU/ML
WBC # BLD AUTO: 11.4 K/UL (ref 4.1–11.1)

## 2024-02-02 PROCEDURE — 85520 HEPARIN ASSAY: CPT

## 2024-02-02 PROCEDURE — 97530 THERAPEUTIC ACTIVITIES: CPT

## 2024-02-02 PROCEDURE — 2500000003 HC RX 250 WO HCPCS: Performed by: INTERNAL MEDICINE

## 2024-02-02 PROCEDURE — 2580000003 HC RX 258: Performed by: INTERNAL MEDICINE

## 2024-02-02 PROCEDURE — 6360000002 HC RX W HCPCS: Performed by: INTERNAL MEDICINE

## 2024-02-02 PROCEDURE — 2060000000 HC ICU INTERMEDIATE R&B

## 2024-02-02 PROCEDURE — 85025 COMPLETE CBC W/AUTO DIFF WBC: CPT

## 2024-02-02 PROCEDURE — 6360000002 HC RX W HCPCS: Performed by: SURGERY

## 2024-02-02 PROCEDURE — 82962 GLUCOSE BLOOD TEST: CPT

## 2024-02-02 PROCEDURE — 36415 COLL VENOUS BLD VENIPUNCTURE: CPT

## 2024-02-02 PROCEDURE — 80048 BASIC METABOLIC PNL TOTAL CA: CPT

## 2024-02-02 PROCEDURE — 94761 N-INVAS EAR/PLS OXIMETRY MLT: CPT

## 2024-02-02 PROCEDURE — A4216 STERILE WATER/SALINE, 10 ML: HCPCS | Performed by: INTERNAL MEDICINE

## 2024-02-02 RX ADMIN — SODIUM CHLORIDE, PRESERVATIVE FREE 10 ML: 5 INJECTION INTRAVENOUS at 09:00

## 2024-02-02 RX ADMIN — HEPARIN SODIUM 2000 UNITS: 1000 INJECTION INTRAVENOUS; SUBCUTANEOUS at 07:40

## 2024-02-02 RX ADMIN — PIPERACILLIN AND TAZOBACTAM 3375 MG: 3; .375 INJECTION, POWDER, FOR SOLUTION INTRAVENOUS at 16:09

## 2024-02-02 RX ADMIN — SODIUM CHLORIDE, POTASSIUM CHLORIDE, SODIUM LACTATE AND CALCIUM CHLORIDE: 600; 310; 30; 20 INJECTION, SOLUTION INTRAVENOUS at 06:13

## 2024-02-02 RX ADMIN — FAMOTIDINE 20 MG: 10 INJECTION, SOLUTION INTRAVENOUS at 08:54

## 2024-02-02 RX ADMIN — HEPARIN SODIUM AND DEXTROSE 16 UNITS/KG/HR: 10000; 5 INJECTION INTRAVENOUS at 11:18

## 2024-02-02 RX ADMIN — SODIUM CHLORIDE 10 MG/HR: 900 INJECTION, SOLUTION INTRAVENOUS at 18:51

## 2024-02-02 RX ADMIN — SODIUM CHLORIDE 5 MG/HR: 900 INJECTION, SOLUTION INTRAVENOUS at 06:11

## 2024-02-02 RX ADMIN — SODIUM CHLORIDE: 9 INJECTION, SOLUTION INTRAVENOUS at 08:58

## 2024-02-02 RX ADMIN — ASCORBIC ACID, VITAMIN A PALMITATE, CHOLECALCIFEROL, THIAMINE HYDROCHLORIDE, RIBOFLAVIN-5 PHOSPHATE SODIUM, PYRIDOXINE HYDROCHLORIDE, NIACINAMIDE, DEXPANTHENOL, ALPHA-TOCOPHEROL ACETATE, VITAMIN K1, FOLIC ACID, BIOTIN, CYANOCOBALAMIN: 200; 3300; 200; 6; 3.6; 6; 40; 15; 10; 150; 600; 60; 5 INJECTION, SOLUTION INTRAVENOUS at 18:53

## 2024-02-02 RX ADMIN — PIPERACILLIN AND TAZOBACTAM 3375 MG: 3; .375 INJECTION, POWDER, FOR SOLUTION INTRAVENOUS at 00:44

## 2024-02-02 RX ADMIN — PIPERACILLIN AND TAZOBACTAM 3375 MG: 3; .375 INJECTION, POWDER, FOR SOLUTION INTRAVENOUS at 09:00

## 2024-02-02 NOTE — CARE COORDINATION
CM Note:  General surgery following-pt is s/p exploratory lap for SBO on 1/31  Possible ileus, ngt, KUB, npo; plan to start PPN  IV pain med  Nephrology following  Cardiology following--a-fib, pt on dilt gtt  IVY Figueroa

## 2024-02-03 LAB
ANION GAP SERPL CALC-SCNC: 4 MMOL/L (ref 5–15)
BASOPHILS # BLD: 0 K/UL (ref 0–0.1)
BASOPHILS NFR BLD: 0 % (ref 0–1)
BUN SERPL-MCNC: 57 MG/DL (ref 6–20)
BUN/CREAT SERPL: 23 (ref 12–20)
CALCIUM SERPL-MCNC: 8.9 MG/DL (ref 8.5–10.1)
CHLORIDE SERPL-SCNC: 108 MMOL/L (ref 97–108)
CO2 SERPL-SCNC: 33 MMOL/L (ref 21–32)
CREAT SERPL-MCNC: 2.47 MG/DL (ref 0.7–1.3)
DIFFERENTIAL METHOD BLD: ABNORMAL
EOSINOPHIL # BLD: 0 K/UL (ref 0–0.4)
EOSINOPHIL NFR BLD: 0 % (ref 0–7)
ERYTHROCYTE [DISTWIDTH] IN BLOOD BY AUTOMATED COUNT: 15 % (ref 11.5–14.5)
GLUCOSE BLD STRIP.AUTO-MCNC: 112 MG/DL (ref 65–117)
GLUCOSE BLD STRIP.AUTO-MCNC: 162 MG/DL (ref 65–117)
GLUCOSE BLD STRIP.AUTO-MCNC: 163 MG/DL (ref 65–117)
GLUCOSE SERPL-MCNC: 129 MG/DL (ref 65–100)
HCT VFR BLD AUTO: 35.5 % (ref 36.6–50.3)
HGB BLD-MCNC: 11.4 G/DL (ref 12.1–17)
IMM GRANULOCYTES # BLD AUTO: 0 K/UL
IMM GRANULOCYTES NFR BLD AUTO: 0 %
LYMPHOCYTES # BLD: 0.7 K/UL (ref 0.8–3.5)
LYMPHOCYTES NFR BLD: 6 % (ref 12–49)
MAGNESIUM SERPL-MCNC: 2.4 MG/DL (ref 1.6–2.4)
MCH RBC QN AUTO: 28.8 PG (ref 26–34)
MCHC RBC AUTO-ENTMCNC: 32.1 G/DL (ref 30–36.5)
MCV RBC AUTO: 89.6 FL (ref 80–99)
MONOCYTES # BLD: 0.9 K/UL (ref 0–1)
MONOCYTES NFR BLD: 8 % (ref 5–13)
NEUTS BAND NFR BLD MANUAL: 2 % (ref 0–6)
NEUTS SEG # BLD: 10.1 K/UL (ref 1.8–8)
NEUTS SEG NFR BLD: 84 % (ref 32–75)
NRBC # BLD: 0 K/UL (ref 0–0.01)
NRBC BLD-RTO: 0 PER 100 WBC
PHOSPHATE SERPL-MCNC: 3.9 MG/DL (ref 2.6–4.7)
PLATELET # BLD AUTO: 206 K/UL (ref 150–400)
PMV BLD AUTO: 9.4 FL (ref 8.9–12.9)
POTASSIUM SERPL-SCNC: 3.3 MMOL/L (ref 3.5–5.1)
RBC # BLD AUTO: 3.96 M/UL (ref 4.1–5.7)
RBC MORPH BLD: ABNORMAL
SERVICE CMNT-IMP: ABNORMAL
SERVICE CMNT-IMP: ABNORMAL
SERVICE CMNT-IMP: NORMAL
SODIUM SERPL-SCNC: 145 MMOL/L (ref 136–145)
UFH PPP CHRO-ACNC: 0.44 IU/ML
WBC # BLD AUTO: 11.7 K/UL (ref 4.1–11.1)

## 2024-02-03 PROCEDURE — 36415 COLL VENOUS BLD VENIPUNCTURE: CPT

## 2024-02-03 PROCEDURE — 82962 GLUCOSE BLOOD TEST: CPT

## 2024-02-03 PROCEDURE — 6360000002 HC RX W HCPCS: Performed by: INTERNAL MEDICINE

## 2024-02-03 PROCEDURE — 84100 ASSAY OF PHOSPHORUS: CPT

## 2024-02-03 PROCEDURE — 2500000003 HC RX 250 WO HCPCS: Performed by: INTERNAL MEDICINE

## 2024-02-03 PROCEDURE — 80048 BASIC METABOLIC PNL TOTAL CA: CPT

## 2024-02-03 PROCEDURE — 85025 COMPLETE CBC W/AUTO DIFF WBC: CPT

## 2024-02-03 PROCEDURE — 2580000003 HC RX 258: Performed by: INTERNAL MEDICINE

## 2024-02-03 PROCEDURE — 83735 ASSAY OF MAGNESIUM: CPT

## 2024-02-03 PROCEDURE — 85520 HEPARIN ASSAY: CPT

## 2024-02-03 PROCEDURE — 6360000002 HC RX W HCPCS: Performed by: SURGERY

## 2024-02-03 PROCEDURE — 2060000000 HC ICU INTERMEDIATE R&B

## 2024-02-03 PROCEDURE — A4216 STERILE WATER/SALINE, 10 ML: HCPCS | Performed by: INTERNAL MEDICINE

## 2024-02-03 PROCEDURE — 94761 N-INVAS EAR/PLS OXIMETRY MLT: CPT

## 2024-02-03 RX ORDER — POTASSIUM CHLORIDE 7.45 MG/ML
10 INJECTION INTRAVENOUS
Status: COMPLETED | OUTPATIENT
Start: 2024-02-03 | End: 2024-02-03

## 2024-02-03 RX ADMIN — SODIUM CHLORIDE, PRESERVATIVE FREE 10 ML: 5 INJECTION INTRAVENOUS at 10:01

## 2024-02-03 RX ADMIN — SODIUM CHLORIDE 10 MG/HR: 900 INJECTION, SOLUTION INTRAVENOUS at 22:31

## 2024-02-03 RX ADMIN — POTASSIUM CHLORIDE 10 MEQ: 7.46 INJECTION, SOLUTION INTRAVENOUS at 15:38

## 2024-02-03 RX ADMIN — SODIUM CHLORIDE, PRESERVATIVE FREE 10 ML: 5 INJECTION INTRAVENOUS at 20:16

## 2024-02-03 RX ADMIN — SODIUM CHLORIDE, POTASSIUM CHLORIDE, SODIUM LACTATE AND CALCIUM CHLORIDE: 600; 310; 30; 20 INJECTION, SOLUTION INTRAVENOUS at 05:43

## 2024-02-03 RX ADMIN — FAMOTIDINE 20 MG: 10 INJECTION, SOLUTION INTRAVENOUS at 10:14

## 2024-02-03 RX ADMIN — PIPERACILLIN AND TAZOBACTAM 3375 MG: 3; .375 INJECTION, POWDER, FOR SOLUTION INTRAVENOUS at 15:30

## 2024-02-03 RX ADMIN — LABETALOL HYDROCHLORIDE 10 MG: 5 INJECTION, SOLUTION INTRAVENOUS at 05:50

## 2024-02-03 RX ADMIN — LABETALOL HYDROCHLORIDE 10 MG: 5 INJECTION, SOLUTION INTRAVENOUS at 22:32

## 2024-02-03 RX ADMIN — PIPERACILLIN AND TAZOBACTAM 3375 MG: 3; .375 INJECTION, POWDER, FOR SOLUTION INTRAVENOUS at 09:58

## 2024-02-03 RX ADMIN — PIPERACILLIN AND TAZOBACTAM 3375 MG: 3; .375 INJECTION, POWDER, FOR SOLUTION INTRAVENOUS at 00:41

## 2024-02-03 RX ADMIN — ASCORBIC ACID, VITAMIN A PALMITATE, CHOLECALCIFEROL, THIAMINE HYDROCHLORIDE, RIBOFLAVIN-5 PHOSPHATE SODIUM, PYRIDOXINE HYDROCHLORIDE, NIACINAMIDE, DEXPANTHENOL, ALPHA-TOCOPHEROL ACETATE, VITAMIN K1, FOLIC ACID, BIOTIN, CYANOCOBALAMIN: 200; 3300; 200; 6; 3.6; 6; 40; 15; 10; 150; 600; 60; 5 INJECTION, SOLUTION INTRAVENOUS at 17:18

## 2024-02-03 RX ADMIN — HYDROMORPHONE HYDROCHLORIDE 0.5 MG: 1 INJECTION, SOLUTION INTRAMUSCULAR; INTRAVENOUS; SUBCUTANEOUS at 20:21

## 2024-02-03 RX ADMIN — SODIUM CHLORIDE 10 MG/HR: 900 INJECTION, SOLUTION INTRAVENOUS at 11:41

## 2024-02-03 RX ADMIN — HEPARIN SODIUM AND DEXTROSE 16 UNITS/KG/HR: 10000; 5 INJECTION INTRAVENOUS at 19:43

## 2024-02-03 RX ADMIN — POTASSIUM CHLORIDE 10 MEQ: 7.46 INJECTION, SOLUTION INTRAVENOUS at 12:40

## 2024-02-03 RX ADMIN — LABETALOL HYDROCHLORIDE 10 MG: 5 INJECTION, SOLUTION INTRAVENOUS at 15:24

## 2024-02-03 RX ADMIN — SODIUM CHLORIDE 10 MG/HR: 900 INJECTION, SOLUTION INTRAVENOUS at 02:23

## 2024-02-03 RX ADMIN — HEPARIN SODIUM AND DEXTROSE 16 UNITS/KG/HR: 10000; 5 INJECTION INTRAVENOUS at 02:19

## 2024-02-03 RX ADMIN — POTASSIUM CHLORIDE 10 MEQ: 7.46 INJECTION, SOLUTION INTRAVENOUS at 10:04

## 2024-02-04 LAB
ALDOST SERPL-MCNC: 10.9 NG/DL (ref 0–30)
ALDOST/RENIN PLAS-RTO: 13.9 (ref 0–30)
ANION GAP SERPL CALC-SCNC: 3 MMOL/L (ref 5–15)
BUN SERPL-MCNC: 46 MG/DL (ref 6–20)
BUN/CREAT SERPL: 21 (ref 12–20)
CALCIUM SERPL-MCNC: 8.8 MG/DL (ref 8.5–10.1)
CHLORIDE SERPL-SCNC: 111 MMOL/L (ref 97–108)
CO2 SERPL-SCNC: 31 MMOL/L (ref 21–32)
COMMENT:: NORMAL
CREAT SERPL-MCNC: 2.17 MG/DL (ref 0.7–1.3)
GLUCOSE BLD STRIP.AUTO-MCNC: 126 MG/DL (ref 65–117)
GLUCOSE BLD STRIP.AUTO-MCNC: 174 MG/DL (ref 65–117)
GLUCOSE BLD STRIP.AUTO-MCNC: 217 MG/DL (ref 65–117)
GLUCOSE SERPL-MCNC: 225 MG/DL (ref 65–100)
MAGNESIUM SERPL-MCNC: 2.2 MG/DL (ref 1.6–2.4)
PHOSPHATE SERPL-MCNC: 4.4 MG/DL (ref 2.6–4.7)
POTASSIUM SERPL-SCNC: 3.5 MMOL/L (ref 3.5–5.1)
RENIN PLAS-CCNC: 0.79 NG/ML/HR (ref 0.17–5.38)
SERVICE CMNT-IMP: ABNORMAL
SODIUM SERPL-SCNC: 145 MMOL/L (ref 136–145)
SPECIMEN HOLD: NORMAL
UFH PPP CHRO-ACNC: 0.43 IU/ML

## 2024-02-04 PROCEDURE — 6360000002 HC RX W HCPCS: Performed by: INTERNAL MEDICINE

## 2024-02-04 PROCEDURE — 94761 N-INVAS EAR/PLS OXIMETRY MLT: CPT

## 2024-02-04 PROCEDURE — 2500000003 HC RX 250 WO HCPCS: Performed by: INTERNAL MEDICINE

## 2024-02-04 PROCEDURE — 2580000003 HC RX 258: Performed by: INTERNAL MEDICINE

## 2024-02-04 PROCEDURE — 85520 HEPARIN ASSAY: CPT

## 2024-02-04 PROCEDURE — A4216 STERILE WATER/SALINE, 10 ML: HCPCS | Performed by: INTERNAL MEDICINE

## 2024-02-04 PROCEDURE — 82962 GLUCOSE BLOOD TEST: CPT

## 2024-02-04 PROCEDURE — 2060000000 HC ICU INTERMEDIATE R&B

## 2024-02-04 PROCEDURE — 84100 ASSAY OF PHOSPHORUS: CPT

## 2024-02-04 PROCEDURE — 83735 ASSAY OF MAGNESIUM: CPT

## 2024-02-04 PROCEDURE — 80048 BASIC METABOLIC PNL TOTAL CA: CPT

## 2024-02-04 PROCEDURE — 6360000002 HC RX W HCPCS: Performed by: SURGERY

## 2024-02-04 RX ORDER — ENOXAPARIN SODIUM 100 MG/ML
1 INJECTION SUBCUTANEOUS 2 TIMES DAILY
Status: DISCONTINUED | OUTPATIENT
Start: 2024-02-04 | End: 2024-02-10

## 2024-02-04 RX ADMIN — LORAZEPAM 0.5 MG: 2 INJECTION INTRAMUSCULAR; INTRAVENOUS at 18:06

## 2024-02-04 RX ADMIN — HEPARIN SODIUM AND DEXTROSE 16 UNITS/KG/HR: 10000; 5 INJECTION INTRAVENOUS at 12:38

## 2024-02-04 RX ADMIN — SODIUM CHLORIDE 10 MG/HR: 900 INJECTION, SOLUTION INTRAVENOUS at 09:25

## 2024-02-04 RX ADMIN — PIPERACILLIN AND TAZOBACTAM 3375 MG: 3; .375 INJECTION, POWDER, FOR SOLUTION INTRAVENOUS at 09:32

## 2024-02-04 RX ADMIN — PIPERACILLIN AND TAZOBACTAM 3375 MG: 3; .375 INJECTION, POWDER, FOR SOLUTION INTRAVENOUS at 15:58

## 2024-02-04 RX ADMIN — SODIUM CHLORIDE 15 MG/HR: 900 INJECTION, SOLUTION INTRAVENOUS at 18:54

## 2024-02-04 RX ADMIN — HYDROMORPHONE HYDROCHLORIDE 0.5 MG: 1 INJECTION, SOLUTION INTRAMUSCULAR; INTRAVENOUS; SUBCUTANEOUS at 05:30

## 2024-02-04 RX ADMIN — ENOXAPARIN SODIUM 100 MG: 100 INJECTION SUBCUTANEOUS at 18:06

## 2024-02-04 RX ADMIN — PIPERACILLIN AND TAZOBACTAM 3375 MG: 3; .375 INJECTION, POWDER, FOR SOLUTION INTRAVENOUS at 00:12

## 2024-02-04 RX ADMIN — ASCORBIC ACID, VITAMIN A PALMITATE, CHOLECALCIFEROL, THIAMINE HYDROCHLORIDE, RIBOFLAVIN-5 PHOSPHATE SODIUM, PYRIDOXINE HYDROCHLORIDE, NIACINAMIDE, DEXPANTHENOL, ALPHA-TOCOPHEROL ACETATE, VITAMIN K1, FOLIC ACID, BIOTIN, CYANOCOBALAMIN: 200; 3300; 200; 6; 3.6; 6; 40; 15; 10; 150; 600; 60; 5 INJECTION, SOLUTION INTRAVENOUS at 19:27

## 2024-02-04 RX ADMIN — LABETALOL HYDROCHLORIDE 10 MG: 5 INJECTION, SOLUTION INTRAVENOUS at 16:00

## 2024-02-04 RX ADMIN — FAMOTIDINE 20 MG: 10 INJECTION, SOLUTION INTRAVENOUS at 09:31

## 2024-02-04 RX ADMIN — HYDROMORPHONE HYDROCHLORIDE 0.5 MG: 1 INJECTION, SOLUTION INTRAMUSCULAR; INTRAVENOUS; SUBCUTANEOUS at 00:09

## 2024-02-04 RX ADMIN — LABETALOL HYDROCHLORIDE 10 MG: 5 INJECTION, SOLUTION INTRAVENOUS at 09:26

## 2024-02-04 RX ADMIN — HYDROMORPHONE HYDROCHLORIDE 0.5 MG: 1 INJECTION, SOLUTION INTRAMUSCULAR; INTRAVENOUS; SUBCUTANEOUS at 18:06

## 2024-02-04 RX ADMIN — SODIUM CHLORIDE, PRESERVATIVE FREE 10 ML: 5 INJECTION INTRAVENOUS at 08:05

## 2024-02-05 ENCOUNTER — APPOINTMENT (OUTPATIENT)
Facility: HOSPITAL | Age: 69
DRG: 335 | End: 2024-02-05
Attending: INTERNAL MEDICINE
Payer: MEDICARE

## 2024-02-05 LAB
ANION GAP SERPL CALC-SCNC: 6 MMOL/L (ref 5–15)
BUN SERPL-MCNC: 44 MG/DL (ref 6–20)
BUN/CREAT SERPL: 22 (ref 12–20)
CALCIUM SERPL-MCNC: 8.4 MG/DL (ref 8.5–10.1)
CHLORIDE SERPL-SCNC: 113 MMOL/L (ref 97–108)
CO2 SERPL-SCNC: 28 MMOL/L (ref 21–32)
CREAT SERPL-MCNC: 1.98 MG/DL (ref 0.7–1.3)
CRP SERPL-MCNC: 26.1 MG/DL (ref 0–0.3)
ERYTHROCYTE [DISTWIDTH] IN BLOOD BY AUTOMATED COUNT: 15.9 % (ref 11.5–14.5)
EST. AVERAGE GLUCOSE BLD GHB EST-MCNC: 131 MG/DL
GLUCOSE BLD STRIP.AUTO-MCNC: 151 MG/DL (ref 65–117)
GLUCOSE BLD STRIP.AUTO-MCNC: 163 MG/DL (ref 65–117)
GLUCOSE SERPL-MCNC: 163 MG/DL (ref 65–100)
HBA1C MFR BLD: 6.2 % (ref 4–5.6)
HCT VFR BLD AUTO: 33.9 % (ref 36.6–50.3)
HGB BLD-MCNC: 10.7 G/DL (ref 12.1–17)
MAGNESIUM SERPL-MCNC: 2.2 MG/DL (ref 1.6–2.4)
MCH RBC QN AUTO: 29.1 PG (ref 26–34)
MCHC RBC AUTO-ENTMCNC: 31.6 G/DL (ref 30–36.5)
MCV RBC AUTO: 92.1 FL (ref 80–99)
NRBC # BLD: 0.02 K/UL (ref 0–0.01)
NRBC BLD-RTO: 0.1 PER 100 WBC
PHOSPHATE SERPL-MCNC: 4.2 MG/DL (ref 2.6–4.7)
PLATELET # BLD AUTO: 206 K/UL (ref 150–400)
PMV BLD AUTO: 9.5 FL (ref 8.9–12.9)
POTASSIUM SERPL-SCNC: 3.3 MMOL/L (ref 3.5–5.1)
RBC # BLD AUTO: 3.68 M/UL (ref 4.1–5.7)
SERVICE CMNT-IMP: ABNORMAL
SERVICE CMNT-IMP: ABNORMAL
SODIUM SERPL-SCNC: 147 MMOL/L (ref 136–145)
TRIGL SERPL-MCNC: 226 MG/DL
WBC # BLD AUTO: 13.7 K/UL (ref 4.1–11.1)

## 2024-02-05 PROCEDURE — 84478 ASSAY OF TRIGLYCERIDES: CPT

## 2024-02-05 PROCEDURE — 2060000000 HC ICU INTERMEDIATE R&B

## 2024-02-05 PROCEDURE — 6360000002 HC RX W HCPCS: Performed by: INTERNAL MEDICINE

## 2024-02-05 PROCEDURE — 6370000000 HC RX 637 (ALT 250 FOR IP): Performed by: SURGERY

## 2024-02-05 PROCEDURE — 83036 HEMOGLOBIN GLYCOSYLATED A1C: CPT

## 2024-02-05 PROCEDURE — 71045 X-RAY EXAM CHEST 1 VIEW: CPT

## 2024-02-05 PROCEDURE — C9113 INJ PANTOPRAZOLE SODIUM, VIA: HCPCS | Performed by: INTERNAL MEDICINE

## 2024-02-05 PROCEDURE — 83735 ASSAY OF MAGNESIUM: CPT

## 2024-02-05 PROCEDURE — A4216 STERILE WATER/SALINE, 10 ML: HCPCS | Performed by: INTERNAL MEDICINE

## 2024-02-05 PROCEDURE — 2709999900 HC NON-CHARGEABLE SUPPLY: Performed by: NURSE PRACTITIONER

## 2024-02-05 PROCEDURE — 85027 COMPLETE CBC AUTOMATED: CPT

## 2024-02-05 PROCEDURE — 05HM33Z INSERTION OF INFUSION DEVICE INTO RIGHT INTERNAL JUGULAR VEIN, PERCUTANEOUS APPROACH: ICD-10-PCS | Performed by: STUDENT IN AN ORGANIZED HEALTH CARE EDUCATION/TRAINING PROGRAM

## 2024-02-05 PROCEDURE — 2580000003 HC RX 258: Performed by: INTERNAL MEDICINE

## 2024-02-05 PROCEDURE — C1769 GUIDE WIRE: HCPCS | Performed by: NURSE PRACTITIONER

## 2024-02-05 PROCEDURE — 86140 C-REACTIVE PROTEIN: CPT

## 2024-02-05 PROCEDURE — 97530 THERAPEUTIC ACTIVITIES: CPT

## 2024-02-05 PROCEDURE — 2500000003 HC RX 250 WO HCPCS: Performed by: NURSE PRACTITIONER

## 2024-02-05 PROCEDURE — 2500000003 HC RX 250 WO HCPCS: Performed by: INTERNAL MEDICINE

## 2024-02-05 PROCEDURE — 84100 ASSAY OF PHOSPHORUS: CPT

## 2024-02-05 PROCEDURE — 82962 GLUCOSE BLOOD TEST: CPT

## 2024-02-05 PROCEDURE — 94761 N-INVAS EAR/PLS OXIMETRY MLT: CPT

## 2024-02-05 PROCEDURE — B543ZZA ULTRASONOGRAPHY OF RIGHT JUGULAR VEINS, GUIDANCE: ICD-10-PCS | Performed by: STUDENT IN AN ORGANIZED HEALTH CARE EDUCATION/TRAINING PROGRAM

## 2024-02-05 PROCEDURE — C1894 INTRO/SHEATH, NON-LASER: HCPCS | Performed by: NURSE PRACTITIONER

## 2024-02-05 PROCEDURE — 76937 US GUIDE VASCULAR ACCESS: CPT

## 2024-02-05 PROCEDURE — 80048 BASIC METABOLIC PNL TOTAL CA: CPT

## 2024-02-05 PROCEDURE — C1751 CATH, INF, PER/CENT/MIDLINE: HCPCS | Performed by: NURSE PRACTITIONER

## 2024-02-05 PROCEDURE — 36415 COLL VENOUS BLD VENIPUNCTURE: CPT

## 2024-02-05 RX ORDER — BISACODYL 10 MG
10 SUPPOSITORY, RECTAL RECTAL DAILY
Status: DISCONTINUED | OUTPATIENT
Start: 2024-02-05 | End: 2024-02-07

## 2024-02-05 RX ORDER — PROCHLORPERAZINE EDISYLATE 5 MG/ML
10 INJECTION INTRAMUSCULAR; INTRAVENOUS EVERY 6 HOURS PRN
Status: DISCONTINUED | OUTPATIENT
Start: 2024-02-05 | End: 2024-02-12 | Stop reason: HOSPADM

## 2024-02-05 RX ORDER — LIDOCAINE HYDROCHLORIDE 10 MG/ML
INJECTION, SOLUTION INFILTRATION; PERINEURAL PRN
Status: COMPLETED | OUTPATIENT
Start: 2024-02-05 | End: 2024-02-05

## 2024-02-05 RX ORDER — POTASSIUM CHLORIDE 7.45 MG/ML
10 INJECTION INTRAVENOUS
Status: COMPLETED | OUTPATIENT
Start: 2024-02-05 | End: 2024-02-05

## 2024-02-05 RX ADMIN — LABETALOL HYDROCHLORIDE 10 MG: 5 INJECTION, SOLUTION INTRAVENOUS at 04:11

## 2024-02-05 RX ADMIN — SODIUM CHLORIDE, PRESERVATIVE FREE 10 ML: 5 INJECTION INTRAVENOUS at 07:34

## 2024-02-05 RX ADMIN — LIDOCAINE HYDROCHLORIDE 2 ML: 10 INJECTION, SOLUTION INFILTRATION; PERINEURAL at 11:46

## 2024-02-05 RX ADMIN — BISACODYL 10 MG: 10 SUPPOSITORY RECTAL at 14:46

## 2024-02-05 RX ADMIN — LABETALOL HYDROCHLORIDE 10 MG: 5 INJECTION, SOLUTION INTRAVENOUS at 16:02

## 2024-02-05 RX ADMIN — PANTOPRAZOLE SODIUM 40 MG: 40 INJECTION, POWDER, FOR SOLUTION INTRAVENOUS at 14:46

## 2024-02-05 RX ADMIN — SODIUM CHLORIDE 15 MG/HR: 900 INJECTION, SOLUTION INTRAVENOUS at 16:13

## 2024-02-05 RX ADMIN — PIPERACILLIN AND TAZOBACTAM 3375 MG: 3; .375 INJECTION, POWDER, FOR SOLUTION INTRAVENOUS at 15:53

## 2024-02-05 RX ADMIN — PIPERACILLIN AND TAZOBACTAM 3375 MG: 3; .375 INJECTION, POWDER, FOR SOLUTION INTRAVENOUS at 07:37

## 2024-02-05 RX ADMIN — ASCORBIC ACID, VITAMIN A PALMITATE, CHOLECALCIFEROL, THIAMINE HYDROCHLORIDE, RIBOFLAVIN-5 PHOSPHATE SODIUM, PYRIDOXINE HYDROCHLORIDE, NIACINAMIDE, DEXPANTHENOL, ALPHA-TOCOPHEROL ACETATE, VITAMIN K1, FOLIC ACID, BIOTIN, CYANOCOBALAMIN: 200; 3300; 200; 6; 3.6; 6; 40; 15; 10; 150; 600; 60; 5 INJECTION, SOLUTION INTRAVENOUS at 18:34

## 2024-02-05 RX ADMIN — SOYBEAN OIL 250 ML: 20 INJECTION, SOLUTION INTRAVENOUS at 18:31

## 2024-02-05 RX ADMIN — POTASSIUM CHLORIDE 10 MEQ: 7.46 INJECTION, SOLUTION INTRAVENOUS at 10:01

## 2024-02-05 RX ADMIN — ENOXAPARIN SODIUM 100 MG: 100 INJECTION SUBCUTANEOUS at 06:29

## 2024-02-05 RX ADMIN — PIPERACILLIN AND TAZOBACTAM 3375 MG: 3; .375 INJECTION, POWDER, FOR SOLUTION INTRAVENOUS at 00:37

## 2024-02-05 RX ADMIN — POTASSIUM CHLORIDE 10 MEQ: 7.46 INJECTION, SOLUTION INTRAVENOUS at 12:27

## 2024-02-05 RX ADMIN — SODIUM CHLORIDE 15 MG/HR: 900 INJECTION, SOLUTION INTRAVENOUS at 03:27

## 2024-02-05 RX ADMIN — FAMOTIDINE 20 MG: 10 INJECTION, SOLUTION INTRAVENOUS at 07:33

## 2024-02-05 RX ADMIN — SODIUM CHLORIDE 15 MG/HR: 900 INJECTION, SOLUTION INTRAVENOUS at 09:22

## 2024-02-05 RX ADMIN — POTASSIUM CHLORIDE 10 MEQ: 7.46 INJECTION, SOLUTION INTRAVENOUS at 07:57

## 2024-02-05 RX ADMIN — ENOXAPARIN SODIUM 100 MG: 100 INJECTION SUBCUTANEOUS at 18:30

## 2024-02-05 NOTE — CARE COORDINATION
CM Note:  General surgery following-pt is s/p exploratory lap for SBO on 1/31  Pt on PPN-dietitian following, pt contiues with ngt  PT/OT following-recommending IPR  Nephrology following  Cardiology following--a-fib, pt on dilt gtt  IVY Figueroa

## 2024-02-06 LAB
ANION GAP SERPL CALC-SCNC: 6 MMOL/L (ref 5–15)
BUN SERPL-MCNC: 41 MG/DL (ref 6–20)
BUN/CREAT SERPL: 21 (ref 12–20)
CALCIUM SERPL-MCNC: 8.4 MG/DL (ref 8.5–10.1)
CHLORIDE SERPL-SCNC: 115 MMOL/L (ref 97–108)
CO2 SERPL-SCNC: 26 MMOL/L (ref 21–32)
CREAT SERPL-MCNC: 1.92 MG/DL (ref 0.7–1.3)
CRP SERPL-MCNC: 23.5 MG/DL (ref 0–0.3)
ECHO BSA: 2.13 M2
ERYTHROCYTE [DISTWIDTH] IN BLOOD BY AUTOMATED COUNT: 15.9 % (ref 11.5–14.5)
GLUCOSE BLD STRIP.AUTO-MCNC: 176 MG/DL (ref 65–117)
GLUCOSE BLD STRIP.AUTO-MCNC: 176 MG/DL (ref 65–117)
GLUCOSE BLD STRIP.AUTO-MCNC: 192 MG/DL (ref 65–117)
GLUCOSE BLD STRIP.AUTO-MCNC: 222 MG/DL (ref 65–117)
GLUCOSE SERPL-MCNC: 193 MG/DL (ref 65–100)
HCT VFR BLD AUTO: 33 % (ref 36.6–50.3)
HGB BLD-MCNC: 10.7 G/DL (ref 12.1–17)
MAGNESIUM SERPL-MCNC: 2.2 MG/DL (ref 1.6–2.4)
MCH RBC QN AUTO: 29.7 PG (ref 26–34)
MCHC RBC AUTO-ENTMCNC: 32.4 G/DL (ref 30–36.5)
MCV RBC AUTO: 91.7 FL (ref 80–99)
NRBC # BLD: 0.02 K/UL (ref 0–0.01)
NRBC BLD-RTO: 0.1 PER 100 WBC
PHOSPHATE SERPL-MCNC: 3.9 MG/DL (ref 2.6–4.7)
PLATELET # BLD AUTO: 234 K/UL (ref 150–400)
PMV BLD AUTO: 9.7 FL (ref 8.9–12.9)
POTASSIUM SERPL-SCNC: 3.5 MMOL/L (ref 3.5–5.1)
RBC # BLD AUTO: 3.6 M/UL (ref 4.1–5.7)
SERVICE CMNT-IMP: ABNORMAL
SODIUM SERPL-SCNC: 147 MMOL/L (ref 136–145)
WBC # BLD AUTO: 18.3 K/UL (ref 4.1–11.1)

## 2024-02-06 PROCEDURE — 97530 THERAPEUTIC ACTIVITIES: CPT

## 2024-02-06 PROCEDURE — 2580000003 HC RX 258: Performed by: INTERNAL MEDICINE

## 2024-02-06 PROCEDURE — A4216 STERILE WATER/SALINE, 10 ML: HCPCS | Performed by: INTERNAL MEDICINE

## 2024-02-06 PROCEDURE — 97110 THERAPEUTIC EXERCISES: CPT

## 2024-02-06 PROCEDURE — 6370000000 HC RX 637 (ALT 250 FOR IP): Performed by: INTERNAL MEDICINE

## 2024-02-06 PROCEDURE — 97530 THERAPEUTIC ACTIVITIES: CPT | Performed by: OCCUPATIONAL THERAPIST

## 2024-02-06 PROCEDURE — 2500000003 HC RX 250 WO HCPCS: Performed by: INTERNAL MEDICINE

## 2024-02-06 PROCEDURE — 86140 C-REACTIVE PROTEIN: CPT

## 2024-02-06 PROCEDURE — C9113 INJ PANTOPRAZOLE SODIUM, VIA: HCPCS | Performed by: INTERNAL MEDICINE

## 2024-02-06 PROCEDURE — 82962 GLUCOSE BLOOD TEST: CPT

## 2024-02-06 PROCEDURE — 36556 INSERT NON-TUNNEL CV CATH: CPT

## 2024-02-06 PROCEDURE — 6370000000 HC RX 637 (ALT 250 FOR IP): Performed by: SURGERY

## 2024-02-06 PROCEDURE — 97110 THERAPEUTIC EXERCISES: CPT | Performed by: OCCUPATIONAL THERAPIST

## 2024-02-06 PROCEDURE — 85027 COMPLETE CBC AUTOMATED: CPT

## 2024-02-06 PROCEDURE — 80048 BASIC METABOLIC PNL TOTAL CA: CPT

## 2024-02-06 PROCEDURE — 6360000002 HC RX W HCPCS: Performed by: INTERNAL MEDICINE

## 2024-02-06 PROCEDURE — 84100 ASSAY OF PHOSPHORUS: CPT

## 2024-02-06 PROCEDURE — 97535 SELF CARE MNGMENT TRAINING: CPT | Performed by: OCCUPATIONAL THERAPIST

## 2024-02-06 PROCEDURE — 83735 ASSAY OF MAGNESIUM: CPT

## 2024-02-06 PROCEDURE — 2060000000 HC ICU INTERMEDIATE R&B

## 2024-02-06 PROCEDURE — 36415 COLL VENOUS BLD VENIPUNCTURE: CPT

## 2024-02-06 PROCEDURE — 97116 GAIT TRAINING THERAPY: CPT

## 2024-02-06 RX ORDER — METOCLOPRAMIDE HYDROCHLORIDE 5 MG/ML
5 INJECTION INTRAMUSCULAR; INTRAVENOUS EVERY 8 HOURS
Status: DISCONTINUED | OUTPATIENT
Start: 2024-02-06 | End: 2024-02-08

## 2024-02-06 RX ORDER — METOCLOPRAMIDE HYDROCHLORIDE 5 MG/ML
INJECTION INTRAMUSCULAR; INTRAVENOUS
Status: DISPENSED
Start: 2024-02-06 | End: 2024-02-07

## 2024-02-06 RX ORDER — LABETALOL 100 MG/1
100 TABLET, FILM COATED ORAL EVERY 12 HOURS SCHEDULED
Status: DISCONTINUED | OUTPATIENT
Start: 2024-02-06 | End: 2024-02-12 | Stop reason: HOSPADM

## 2024-02-06 RX ORDER — WATER 10 ML/10ML
INJECTION INTRAMUSCULAR; INTRAVENOUS; SUBCUTANEOUS
Status: DISPENSED
Start: 2024-02-06 | End: 2024-02-07

## 2024-02-06 RX ORDER — LABETALOL HYDROCHLORIDE 5 MG/ML
20 INJECTION, SOLUTION INTRAVENOUS EVERY 4 HOURS PRN
Status: DISCONTINUED | OUTPATIENT
Start: 2024-02-06 | End: 2024-02-12 | Stop reason: HOSPADM

## 2024-02-06 RX ORDER — INSULIN LISPRO 100 [IU]/ML
0-8 INJECTION, SOLUTION INTRAVENOUS; SUBCUTANEOUS EVERY 6 HOURS
Status: DISCONTINUED | OUTPATIENT
Start: 2024-02-06 | End: 2024-02-09

## 2024-02-06 RX ORDER — METHYLPREDNISOLONE SODIUM SUCCINATE 40 MG/ML
INJECTION, POWDER, LYOPHILIZED, FOR SOLUTION INTRAMUSCULAR; INTRAVENOUS
Status: DISPENSED
Start: 2024-02-06 | End: 2024-02-07

## 2024-02-06 RX ADMIN — SODIUM CHLORIDE 15 MG/HR: 900 INJECTION, SOLUTION INTRAVENOUS at 00:10

## 2024-02-06 RX ADMIN — ENOXAPARIN SODIUM 100 MG: 100 INJECTION SUBCUTANEOUS at 07:25

## 2024-02-06 RX ADMIN — PIPERACILLIN AND TAZOBACTAM 3375 MG: 3; .375 INJECTION, POWDER, FOR SOLUTION INTRAVENOUS at 16:42

## 2024-02-06 RX ADMIN — LABETALOL HYDROCHLORIDE 20 MG: 5 INJECTION, SOLUTION INTRAVENOUS at 08:56

## 2024-02-06 RX ADMIN — METOCLOPRAMIDE 5 MG: 5 INJECTION, SOLUTION INTRAMUSCULAR; INTRAVENOUS at 21:04

## 2024-02-06 RX ADMIN — PIPERACILLIN AND TAZOBACTAM 3375 MG: 3; .375 INJECTION, POWDER, FOR SOLUTION INTRAVENOUS at 07:39

## 2024-02-06 RX ADMIN — SODIUM CHLORIDE 15 MG/HR: 900 INJECTION, SOLUTION INTRAVENOUS at 21:13

## 2024-02-06 RX ADMIN — FAMOTIDINE 20 MG: 10 INJECTION, SOLUTION INTRAVENOUS at 07:26

## 2024-02-06 RX ADMIN — INSULIN LISPRO 2 UNITS: 100 INJECTION, SOLUTION INTRAVENOUS; SUBCUTANEOUS at 18:17

## 2024-02-06 RX ADMIN — ENOXAPARIN SODIUM 100 MG: 100 INJECTION SUBCUTANEOUS at 18:15

## 2024-02-06 RX ADMIN — PIPERACILLIN AND TAZOBACTAM 3375 MG: 3; .375 INJECTION, POWDER, FOR SOLUTION INTRAVENOUS at 00:10

## 2024-02-06 RX ADMIN — LABETALOL HYDROCHLORIDE 100 MG: 100 TABLET, FILM COATED ORAL at 16:38

## 2024-02-06 RX ADMIN — PANTOPRAZOLE SODIUM 40 MG: 40 INJECTION, POWDER, FOR SOLUTION INTRAVENOUS at 07:36

## 2024-02-06 RX ADMIN — LABETALOL HYDROCHLORIDE 10 MG: 5 INJECTION, SOLUTION INTRAVENOUS at 03:21

## 2024-02-06 RX ADMIN — BISACODYL 10 MG: 10 SUPPOSITORY RECTAL at 07:43

## 2024-02-06 RX ADMIN — SODIUM CHLORIDE 15 MG/HR: 900 INJECTION, SOLUTION INTRAVENOUS at 14:01

## 2024-02-06 RX ADMIN — SODIUM CHLORIDE, PRESERVATIVE FREE 10 ML: 5 INJECTION INTRAVENOUS at 07:44

## 2024-02-06 RX ADMIN — ASCORBIC ACID, VITAMIN A PALMITATE, CHOLECALCIFEROL, THIAMINE HYDROCHLORIDE, RIBOFLAVIN-5 PHOSPHATE SODIUM, PYRIDOXINE HYDROCHLORIDE, NIACINAMIDE, DEXPANTHENOL, ALPHA-TOCOPHEROL ACETATE, VITAMIN K1, FOLIC ACID, BIOTIN, CYANOCOBALAMIN: 200; 3300; 200; 6; 3.6; 6; 40; 15; 10; 150; 600; 60; 5 INJECTION, SOLUTION INTRAVENOUS at 18:48

## 2024-02-06 RX ADMIN — LABETALOL HYDROCHLORIDE 20 MG: 5 INJECTION, SOLUTION INTRAVENOUS at 14:57

## 2024-02-06 RX ADMIN — METOCLOPRAMIDE 5 MG: 5 INJECTION, SOLUTION INTRAMUSCULAR; INTRAVENOUS at 12:37

## 2024-02-06 RX ADMIN — SODIUM CHLORIDE 15 MG/HR: 900 INJECTION, SOLUTION INTRAVENOUS at 07:35

## 2024-02-06 RX ADMIN — WATER 40 MG: 1 INJECTION INTRAMUSCULAR; INTRAVENOUS; SUBCUTANEOUS at 12:38

## 2024-02-06 NOTE — CARE COORDINATION
CM Note:  General surgery following-pt is s/p exploratory lap for SBO on 1/31  Pt has central line placed 2/5 and is on TPN; ileus slow to resolve  PT/OT following-recommending IPR  Nephrology following  Cardiology following--a-fib, pt on dilt gtt  L-sided weakness; needs head MRI  IVY Figueroa

## 2024-02-07 ENCOUNTER — HOSPITAL ENCOUNTER (INPATIENT)
Facility: HOSPITAL | Age: 69
Discharge: HOME | DRG: 335 | End: 2024-02-10
Attending: INTERNAL MEDICINE
Payer: MEDICARE

## 2024-02-07 LAB
ANION GAP SERPL CALC-SCNC: 8 MMOL/L (ref 5–15)
BUN SERPL-MCNC: 48 MG/DL (ref 6–20)
BUN/CREAT SERPL: 22 (ref 12–20)
CALCIUM SERPL-MCNC: 8.4 MG/DL (ref 8.5–10.1)
CHLORIDE SERPL-SCNC: 108 MMOL/L (ref 97–108)
CO2 SERPL-SCNC: 23 MMOL/L (ref 21–32)
CREAT SERPL-MCNC: 2.17 MG/DL (ref 0.7–1.3)
CRP SERPL-MCNC: 22.1 MG/DL (ref 0–0.3)
ERYTHROCYTE [DISTWIDTH] IN BLOOD BY AUTOMATED COUNT: 16 % (ref 11.5–14.5)
GLUCOSE BLD STRIP.AUTO-MCNC: 224 MG/DL (ref 65–117)
GLUCOSE BLD STRIP.AUTO-MCNC: 236 MG/DL (ref 65–117)
GLUCOSE BLD STRIP.AUTO-MCNC: 245 MG/DL (ref 65–117)
GLUCOSE BLD STRIP.AUTO-MCNC: 246 MG/DL (ref 65–117)
GLUCOSE BLD STRIP.AUTO-MCNC: 251 MG/DL (ref 65–117)
GLUCOSE SERPL-MCNC: 244 MG/DL (ref 65–100)
HCT VFR BLD AUTO: 30.2 % (ref 36.6–50.3)
HGB BLD-MCNC: 9.5 G/DL (ref 12.1–17)
MAGNESIUM SERPL-MCNC: 2.1 MG/DL (ref 1.6–2.4)
MCH RBC QN AUTO: 29.1 PG (ref 26–34)
MCHC RBC AUTO-ENTMCNC: 31.5 G/DL (ref 30–36.5)
MCV RBC AUTO: 92.4 FL (ref 80–99)
NRBC # BLD: 0 K/UL (ref 0–0.01)
NRBC BLD-RTO: 0 PER 100 WBC
PHOSPHATE SERPL-MCNC: 4.3 MG/DL (ref 2.6–4.7)
PLATELET # BLD AUTO: 220 K/UL (ref 150–400)
PMV BLD AUTO: 9.7 FL (ref 8.9–12.9)
POTASSIUM SERPL-SCNC: 3.7 MMOL/L (ref 3.5–5.1)
RBC # BLD AUTO: 3.27 M/UL (ref 4.1–5.7)
SERVICE CMNT-IMP: ABNORMAL
SODIUM SERPL-SCNC: 139 MMOL/L (ref 136–145)
WBC # BLD AUTO: 16 K/UL (ref 4.1–11.1)

## 2024-02-07 PROCEDURE — 2060000000 HC ICU INTERMEDIATE R&B

## 2024-02-07 PROCEDURE — 2580000003 HC RX 258: Performed by: INTERNAL MEDICINE

## 2024-02-07 PROCEDURE — 6370000000 HC RX 637 (ALT 250 FOR IP): Performed by: INTERNAL MEDICINE

## 2024-02-07 PROCEDURE — 84100 ASSAY OF PHOSPHORUS: CPT

## 2024-02-07 PROCEDURE — 83735 ASSAY OF MAGNESIUM: CPT

## 2024-02-07 PROCEDURE — 97116 GAIT TRAINING THERAPY: CPT

## 2024-02-07 PROCEDURE — 82962 GLUCOSE BLOOD TEST: CPT

## 2024-02-07 PROCEDURE — 97530 THERAPEUTIC ACTIVITIES: CPT

## 2024-02-07 PROCEDURE — 80048 BASIC METABOLIC PNL TOTAL CA: CPT

## 2024-02-07 PROCEDURE — 85027 COMPLETE CBC AUTOMATED: CPT

## 2024-02-07 PROCEDURE — 86140 C-REACTIVE PROTEIN: CPT

## 2024-02-07 PROCEDURE — 2500000003 HC RX 250 WO HCPCS: Performed by: INTERNAL MEDICINE

## 2024-02-07 PROCEDURE — A4216 STERILE WATER/SALINE, 10 ML: HCPCS | Performed by: INTERNAL MEDICINE

## 2024-02-07 PROCEDURE — 6360000002 HC RX W HCPCS: Performed by: INTERNAL MEDICINE

## 2024-02-07 PROCEDURE — 97535 SELF CARE MNGMENT TRAINING: CPT

## 2024-02-07 PROCEDURE — C9113 INJ PANTOPRAZOLE SODIUM, VIA: HCPCS | Performed by: INTERNAL MEDICINE

## 2024-02-07 PROCEDURE — 36556 INSERT NON-TUNNEL CV CATH: CPT

## 2024-02-07 PROCEDURE — 70551 MRI BRAIN STEM W/O DYE: CPT

## 2024-02-07 PROCEDURE — 36415 COLL VENOUS BLD VENIPUNCTURE: CPT

## 2024-02-07 PROCEDURE — 94761 N-INVAS EAR/PLS OXIMETRY MLT: CPT

## 2024-02-07 RX ORDER — PREDNISONE 10 MG/1
10 TABLET ORAL DAILY
Status: DISCONTINUED | OUTPATIENT
Start: 2024-02-08 | End: 2024-02-12 | Stop reason: HOSPADM

## 2024-02-07 RX ORDER — LACTOBACILLUS RHAMNOSUS GG 10B CELL
1 CAPSULE ORAL
Status: DISCONTINUED | OUTPATIENT
Start: 2024-02-07 | End: 2024-02-12 | Stop reason: HOSPADM

## 2024-02-07 RX ORDER — AMLODIPINE BESYLATE 5 MG/1
5 TABLET ORAL DAILY
Status: DISCONTINUED | OUTPATIENT
Start: 2024-02-07 | End: 2024-02-12 | Stop reason: HOSPADM

## 2024-02-07 RX ADMIN — PIPERACILLIN AND TAZOBACTAM 3375 MG: 3; .375 INJECTION, POWDER, FOR SOLUTION INTRAVENOUS at 16:32

## 2024-02-07 RX ADMIN — SODIUM CHLORIDE, PRESERVATIVE FREE 10 ML: 5 INJECTION INTRAVENOUS at 09:09

## 2024-02-07 RX ADMIN — INSULIN LISPRO 2 UNITS: 100 INJECTION, SOLUTION INTRAVENOUS; SUBCUTANEOUS at 18:10

## 2024-02-07 RX ADMIN — LABETALOL HYDROCHLORIDE 100 MG: 100 TABLET, FILM COATED ORAL at 21:18

## 2024-02-07 RX ADMIN — INSULIN LISPRO 2 UNITS: 100 INJECTION, SOLUTION INTRAVENOUS; SUBCUTANEOUS at 12:20

## 2024-02-07 RX ADMIN — AMLODIPINE BESYLATE 5 MG: 5 TABLET ORAL at 09:48

## 2024-02-07 RX ADMIN — METOCLOPRAMIDE 5 MG: 5 INJECTION, SOLUTION INTRAMUSCULAR; INTRAVENOUS at 05:58

## 2024-02-07 RX ADMIN — ENOXAPARIN SODIUM 100 MG: 100 INJECTION SUBCUTANEOUS at 18:10

## 2024-02-07 RX ADMIN — METOCLOPRAMIDE 5 MG: 5 INJECTION, SOLUTION INTRAMUSCULAR; INTRAVENOUS at 12:22

## 2024-02-07 RX ADMIN — SOYBEAN OIL 250 ML: 20 INJECTION, SOLUTION INTRAVENOUS at 18:17

## 2024-02-07 RX ADMIN — PIPERACILLIN AND TAZOBACTAM 3375 MG: 3; .375 INJECTION, POWDER, FOR SOLUTION INTRAVENOUS at 09:09

## 2024-02-07 RX ADMIN — Medication 1 CAPSULE: at 09:09

## 2024-02-07 RX ADMIN — ENOXAPARIN SODIUM 100 MG: 100 INJECTION SUBCUTANEOUS at 05:57

## 2024-02-07 RX ADMIN — INSULIN LISPRO 2 UNITS: 100 INJECTION, SOLUTION INTRAVENOUS; SUBCUTANEOUS at 01:50

## 2024-02-07 RX ADMIN — ASCORBIC ACID, VITAMIN A PALMITATE, CHOLECALCIFEROL, THIAMINE HYDROCHLORIDE, RIBOFLAVIN-5 PHOSPHATE SODIUM, PYRIDOXINE HYDROCHLORIDE, NIACINAMIDE, DEXPANTHENOL, ALPHA-TOCOPHEROL ACETATE, VITAMIN K1, FOLIC ACID, BIOTIN, CYANOCOBALAMIN: 200; 3300; 200; 6; 3.6; 6; 40; 15; 10; 150; 600; 60; 5 INJECTION, SOLUTION INTRAVENOUS at 18:10

## 2024-02-07 RX ADMIN — LABETALOL HYDROCHLORIDE 100 MG: 100 TABLET, FILM COATED ORAL at 09:09

## 2024-02-07 RX ADMIN — INSULIN LISPRO 2 UNITS: 100 INJECTION, SOLUTION INTRAVENOUS; SUBCUTANEOUS at 06:25

## 2024-02-07 RX ADMIN — WATER 40 MG: 1 INJECTION INTRAMUSCULAR; INTRAVENOUS; SUBCUTANEOUS at 09:08

## 2024-02-07 RX ADMIN — METOCLOPRAMIDE 5 MG: 5 INJECTION, SOLUTION INTRAMUSCULAR; INTRAVENOUS at 21:17

## 2024-02-07 RX ADMIN — PANTOPRAZOLE SODIUM 40 MG: 40 INJECTION, POWDER, FOR SOLUTION INTRAVENOUS at 06:25

## 2024-02-07 RX ADMIN — PIPERACILLIN AND TAZOBACTAM 3375 MG: 3; .375 INJECTION, POWDER, FOR SOLUTION INTRAVENOUS at 01:31

## 2024-02-07 RX ADMIN — FAMOTIDINE 20 MG: 10 INJECTION, SOLUTION INTRAVENOUS at 09:08

## 2024-02-08 LAB
ALBUMIN SERPL-MCNC: 1.6 G/DL (ref 3.5–5)
ALBUMIN/GLOB SERPL: 0.3 (ref 1.1–2.2)
ALP SERPL-CCNC: 80 U/L (ref 45–117)
ALT SERPL-CCNC: 18 U/L (ref 12–78)
ANION GAP SERPL CALC-SCNC: 8 MMOL/L (ref 5–15)
AST SERPL-CCNC: 11 U/L (ref 15–37)
BILIRUB SERPL-MCNC: 0.5 MG/DL (ref 0.2–1)
BUN SERPL-MCNC: 51 MG/DL (ref 6–20)
BUN/CREAT SERPL: 26 (ref 12–20)
CALCIUM SERPL-MCNC: 8 MG/DL (ref 8.5–10.1)
CHLORIDE SERPL-SCNC: 105 MMOL/L (ref 97–108)
CO2 SERPL-SCNC: 22 MMOL/L (ref 21–32)
CREAT SERPL-MCNC: 1.94 MG/DL (ref 0.7–1.3)
CRP SERPL-MCNC: 14 MG/DL (ref 0–0.3)
ERYTHROCYTE [DISTWIDTH] IN BLOOD BY AUTOMATED COUNT: 15.6 % (ref 11.5–14.5)
GLOBULIN SER CALC-MCNC: 5.1 G/DL (ref 2–4)
GLUCOSE BLD STRIP.AUTO-MCNC: 153 MG/DL (ref 65–117)
GLUCOSE BLD STRIP.AUTO-MCNC: 160 MG/DL (ref 65–117)
GLUCOSE BLD STRIP.AUTO-MCNC: 166 MG/DL (ref 65–117)
GLUCOSE BLD STRIP.AUTO-MCNC: 172 MG/DL (ref 65–117)
GLUCOSE BLD STRIP.AUTO-MCNC: 187 MG/DL (ref 65–117)
GLUCOSE BLD STRIP.AUTO-MCNC: 213 MG/DL (ref 65–117)
GLUCOSE BLD STRIP.AUTO-MCNC: 216 MG/DL (ref 65–117)
GLUCOSE SERPL-MCNC: 229 MG/DL (ref 65–100)
HCT VFR BLD AUTO: 27.9 % (ref 36.6–50.3)
HGB BLD-MCNC: 9.2 G/DL (ref 12.1–17)
MAGNESIUM SERPL-MCNC: 2 MG/DL (ref 1.6–2.4)
MCH RBC QN AUTO: 29.7 PG (ref 26–34)
MCHC RBC AUTO-ENTMCNC: 33 G/DL (ref 30–36.5)
MCV RBC AUTO: 90 FL (ref 80–99)
NRBC # BLD: 0 K/UL (ref 0–0.01)
NRBC BLD-RTO: 0 PER 100 WBC
PHOSPHATE SERPL-MCNC: 4.1 MG/DL (ref 2.6–4.7)
PLATELET # BLD AUTO: 273 K/UL (ref 150–400)
PMV BLD AUTO: 9.7 FL (ref 8.9–12.9)
POTASSIUM SERPL-SCNC: 3.3 MMOL/L (ref 3.5–5.1)
PROT SERPL-MCNC: 6.7 G/DL (ref 6.4–8.2)
RBC # BLD AUTO: 3.1 M/UL (ref 4.1–5.7)
SERVICE CMNT-IMP: ABNORMAL
SODIUM SERPL-SCNC: 135 MMOL/L (ref 136–145)
WBC # BLD AUTO: 17.4 K/UL (ref 4.1–11.1)

## 2024-02-08 PROCEDURE — 80053 COMPREHEN METABOLIC PANEL: CPT

## 2024-02-08 PROCEDURE — 86140 C-REACTIVE PROTEIN: CPT

## 2024-02-08 PROCEDURE — 36415 COLL VENOUS BLD VENIPUNCTURE: CPT

## 2024-02-08 PROCEDURE — C9113 INJ PANTOPRAZOLE SODIUM, VIA: HCPCS | Performed by: INTERNAL MEDICINE

## 2024-02-08 PROCEDURE — A4216 STERILE WATER/SALINE, 10 ML: HCPCS | Performed by: INTERNAL MEDICINE

## 2024-02-08 PROCEDURE — 2580000003 HC RX 258: Performed by: INTERNAL MEDICINE

## 2024-02-08 PROCEDURE — 82962 GLUCOSE BLOOD TEST: CPT

## 2024-02-08 PROCEDURE — 6370000000 HC RX 637 (ALT 250 FOR IP): Performed by: INTERNAL MEDICINE

## 2024-02-08 PROCEDURE — 83735 ASSAY OF MAGNESIUM: CPT

## 2024-02-08 PROCEDURE — 97535 SELF CARE MNGMENT TRAINING: CPT

## 2024-02-08 PROCEDURE — 6360000002 HC RX W HCPCS: Performed by: INTERNAL MEDICINE

## 2024-02-08 PROCEDURE — 97116 GAIT TRAINING THERAPY: CPT

## 2024-02-08 PROCEDURE — 94761 N-INVAS EAR/PLS OXIMETRY MLT: CPT

## 2024-02-08 PROCEDURE — 85027 COMPLETE CBC AUTOMATED: CPT

## 2024-02-08 PROCEDURE — 2060000000 HC ICU INTERMEDIATE R&B

## 2024-02-08 PROCEDURE — 6370000000 HC RX 637 (ALT 250 FOR IP): Performed by: SURGERY

## 2024-02-08 PROCEDURE — 97530 THERAPEUTIC ACTIVITIES: CPT

## 2024-02-08 PROCEDURE — 2500000003 HC RX 250 WO HCPCS: Performed by: INTERNAL MEDICINE

## 2024-02-08 PROCEDURE — 84100 ASSAY OF PHOSPHORUS: CPT

## 2024-02-08 RX ORDER — FAMOTIDINE 20 MG/1
20 TABLET, FILM COATED ORAL DAILY
Status: DISCONTINUED | OUTPATIENT
Start: 2024-02-09 | End: 2024-02-12 | Stop reason: HOSPADM

## 2024-02-08 RX ORDER — METOCLOPRAMIDE 10 MG/1
5 TABLET ORAL EVERY 8 HOURS
Status: DISCONTINUED | OUTPATIENT
Start: 2024-02-08 | End: 2024-02-12 | Stop reason: HOSPADM

## 2024-02-08 RX ORDER — PANTOPRAZOLE SODIUM 40 MG/1
40 TABLET, DELAYED RELEASE ORAL
Status: DISCONTINUED | OUTPATIENT
Start: 2024-02-09 | End: 2024-02-12 | Stop reason: HOSPADM

## 2024-02-08 RX ADMIN — INSULIN LISPRO 2 UNITS: 100 INJECTION, SOLUTION INTRAVENOUS; SUBCUTANEOUS at 00:19

## 2024-02-08 RX ADMIN — PIPERACILLIN AND TAZOBACTAM 3375 MG: 3; .375 INJECTION, POWDER, FOR SOLUTION INTRAVENOUS at 09:00

## 2024-02-08 RX ADMIN — METOCLOPRAMIDE 5 MG: 10 TABLET ORAL at 15:24

## 2024-02-08 RX ADMIN — LABETALOL HYDROCHLORIDE 100 MG: 100 TABLET, FILM COATED ORAL at 08:54

## 2024-02-08 RX ADMIN — Medication 1 CAPSULE: at 08:54

## 2024-02-08 RX ADMIN — SODIUM CHLORIDE, PRESERVATIVE FREE 10 ML: 5 INJECTION INTRAVENOUS at 20:39

## 2024-02-08 RX ADMIN — PANTOPRAZOLE SODIUM 40 MG: 40 INJECTION, POWDER, FOR SOLUTION INTRAVENOUS at 05:55

## 2024-02-08 RX ADMIN — ACETAMINOPHEN 650 MG: 325 TABLET ORAL at 20:36

## 2024-02-08 RX ADMIN — SODIUM CHLORIDE 5 ML/HR: 9 INJECTION, SOLUTION INTRAVENOUS at 00:16

## 2024-02-08 RX ADMIN — METOCLOPRAMIDE 5 MG: 10 TABLET ORAL at 20:37

## 2024-02-08 RX ADMIN — LABETALOL HYDROCHLORIDE 100 MG: 100 TABLET, FILM COATED ORAL at 20:37

## 2024-02-08 RX ADMIN — SILVER NITRATE 1 EACH: 38.21; 12.74 STICK TOPICAL at 08:00

## 2024-02-08 RX ADMIN — PIPERACILLIN AND TAZOBACTAM 3375 MG: 3; .375 INJECTION, POWDER, FOR SOLUTION INTRAVENOUS at 15:24

## 2024-02-08 RX ADMIN — METOCLOPRAMIDE 5 MG: 5 INJECTION, SOLUTION INTRAMUSCULAR; INTRAVENOUS at 05:55

## 2024-02-08 RX ADMIN — AMLODIPINE BESYLATE 5 MG: 5 TABLET ORAL at 08:54

## 2024-02-08 RX ADMIN — PIPERACILLIN AND TAZOBACTAM 3375 MG: 3; .375 INJECTION, POWDER, FOR SOLUTION INTRAVENOUS at 00:18

## 2024-02-08 RX ADMIN — FAMOTIDINE 20 MG: 10 INJECTION, SOLUTION INTRAVENOUS at 08:54

## 2024-02-08 RX ADMIN — PREDNISONE 10 MG: 10 TABLET ORAL at 08:54

## 2024-02-08 RX ADMIN — POTASSIUM BICARBONATE 40 MEQ: 391 TABLET, EFFERVESCENT ORAL at 08:52

## 2024-02-08 RX ADMIN — SODIUM CHLORIDE, PRESERVATIVE FREE 10 ML: 5 INJECTION INTRAVENOUS at 08:55

## 2024-02-09 LAB
ALBUMIN SERPL-MCNC: 1.8 G/DL (ref 3.5–5)
ALBUMIN/GLOB SERPL: 0.4 (ref 1.1–2.2)
ALP SERPL-CCNC: 77 U/L (ref 45–117)
ALT SERPL-CCNC: 20 U/L (ref 12–78)
ANION GAP SERPL CALC-SCNC: 5 MMOL/L (ref 5–15)
AST SERPL-CCNC: 12 U/L (ref 15–37)
BASOPHILS # BLD: 0 K/UL (ref 0–0.1)
BASOPHILS NFR BLD: 0 % (ref 0–1)
BILIRUB SERPL-MCNC: 0.7 MG/DL (ref 0.2–1)
BUN SERPL-MCNC: 56 MG/DL (ref 6–20)
BUN/CREAT SERPL: 28 (ref 12–20)
CALCIUM SERPL-MCNC: 8 MG/DL (ref 8.5–10.1)
CHLORIDE SERPL-SCNC: 106 MMOL/L (ref 97–108)
CO2 SERPL-SCNC: 27 MMOL/L (ref 21–32)
COMMENT:: NORMAL
CREAT SERPL-MCNC: 2.03 MG/DL (ref 0.7–1.3)
CRP SERPL-MCNC: 6.46 MG/DL (ref 0–0.3)
DIFFERENTIAL METHOD BLD: ABNORMAL
EOSINOPHIL # BLD: 0.1 K/UL (ref 0–0.4)
EOSINOPHIL NFR BLD: 1 % (ref 0–7)
ERYTHROCYTE [DISTWIDTH] IN BLOOD BY AUTOMATED COUNT: 15.3 % (ref 11.5–14.5)
GLOBULIN SER CALC-MCNC: 4.4 G/DL (ref 2–4)
GLUCOSE BLD STRIP.AUTO-MCNC: 150 MG/DL (ref 65–117)
GLUCOSE BLD STRIP.AUTO-MCNC: 92 MG/DL (ref 65–117)
GLUCOSE BLD STRIP.AUTO-MCNC: 99 MG/DL (ref 65–117)
GLUCOSE SERPL-MCNC: 92 MG/DL (ref 65–100)
HCT VFR BLD AUTO: 25 % (ref 36.6–50.3)
HGB BLD-MCNC: 8.1 G/DL (ref 12.1–17)
IMM GRANULOCYTES # BLD AUTO: 0 K/UL
IMM GRANULOCYTES NFR BLD AUTO: 0 %
LYMPHOCYTES # BLD: 1.2 K/UL (ref 0.8–3.5)
LYMPHOCYTES NFR BLD: 8 % (ref 12–49)
MAGNESIUM SERPL-MCNC: 2 MG/DL (ref 1.6–2.4)
MCH RBC QN AUTO: 29.3 PG (ref 26–34)
MCHC RBC AUTO-ENTMCNC: 32.4 G/DL (ref 30–36.5)
MCV RBC AUTO: 90.6 FL (ref 80–99)
MONOCYTES # BLD: 0.9 K/UL (ref 0–1)
MONOCYTES NFR BLD: 6 % (ref 5–13)
MYELOCYTES NFR BLD MANUAL: 4 %
NEUTS SEG # BLD: 11.7 K/UL (ref 1.8–8)
NEUTS SEG NFR BLD: 81 % (ref 32–75)
NRBC # BLD: 0.02 K/UL (ref 0–0.01)
NRBC BLD-RTO: 0.1 PER 100 WBC
PHOSPHATE SERPL-MCNC: 4.6 MG/DL (ref 2.6–4.7)
PLATELET # BLD AUTO: 274 K/UL (ref 150–400)
PMV BLD AUTO: 9.4 FL (ref 8.9–12.9)
POTASSIUM SERPL-SCNC: 2.9 MMOL/L (ref 3.5–5.1)
PROT SERPL-MCNC: 6.2 G/DL (ref 6.4–8.2)
RBC # BLD AUTO: 2.76 M/UL (ref 4.1–5.7)
RBC MORPH BLD: ABNORMAL
SERVICE CMNT-IMP: ABNORMAL
SERVICE CMNT-IMP: NORMAL
SERVICE CMNT-IMP: NORMAL
SODIUM SERPL-SCNC: 138 MMOL/L (ref 136–145)
SPECIMEN HOLD: NORMAL
WBC # BLD AUTO: 14.5 K/UL (ref 4.1–11.1)

## 2024-02-09 PROCEDURE — 2580000003 HC RX 258: Performed by: INTERNAL MEDICINE

## 2024-02-09 PROCEDURE — 84100 ASSAY OF PHOSPHORUS: CPT

## 2024-02-09 PROCEDURE — 6370000000 HC RX 637 (ALT 250 FOR IP): Performed by: INTERNAL MEDICINE

## 2024-02-09 PROCEDURE — 36415 COLL VENOUS BLD VENIPUNCTURE: CPT

## 2024-02-09 PROCEDURE — 6360000002 HC RX W HCPCS: Performed by: INTERNAL MEDICINE

## 2024-02-09 PROCEDURE — 2060000000 HC ICU INTERMEDIATE R&B

## 2024-02-09 PROCEDURE — 82962 GLUCOSE BLOOD TEST: CPT

## 2024-02-09 PROCEDURE — 6370000000 HC RX 637 (ALT 250 FOR IP): Performed by: HOSPITALIST

## 2024-02-09 PROCEDURE — 2500000003 HC RX 250 WO HCPCS: Performed by: SURGERY

## 2024-02-09 PROCEDURE — 97116 GAIT TRAINING THERAPY: CPT

## 2024-02-09 PROCEDURE — 83735 ASSAY OF MAGNESIUM: CPT

## 2024-02-09 PROCEDURE — 80053 COMPREHEN METABOLIC PANEL: CPT

## 2024-02-09 PROCEDURE — 86140 C-REACTIVE PROTEIN: CPT

## 2024-02-09 PROCEDURE — 94761 N-INVAS EAR/PLS OXIMETRY MLT: CPT

## 2024-02-09 PROCEDURE — 85025 COMPLETE CBC W/AUTO DIFF WBC: CPT

## 2024-02-09 RX ORDER — POTASSIUM CHLORIDE 750 MG/1
40 TABLET, FILM COATED, EXTENDED RELEASE ORAL ONCE
Status: COMPLETED | OUTPATIENT
Start: 2024-02-09 | End: 2024-02-09

## 2024-02-09 RX ADMIN — PREDNISONE 10 MG: 10 TABLET ORAL at 09:07

## 2024-02-09 RX ADMIN — PANTOPRAZOLE SODIUM 40 MG: 40 TABLET, DELAYED RELEASE ORAL at 05:48

## 2024-02-09 RX ADMIN — PIPERACILLIN AND TAZOBACTAM 3375 MG: 3; .375 INJECTION, POWDER, FOR SOLUTION INTRAVENOUS at 16:58

## 2024-02-09 RX ADMIN — SODIUM CHLORIDE, PRESERVATIVE FREE 10 ML: 5 INJECTION INTRAVENOUS at 21:08

## 2024-02-09 RX ADMIN — HYDROMORPHONE HYDROCHLORIDE 1 MG: 1 INJECTION, SOLUTION INTRAMUSCULAR; INTRAVENOUS; SUBCUTANEOUS at 05:59

## 2024-02-09 RX ADMIN — METOCLOPRAMIDE 5 MG: 10 TABLET ORAL at 21:18

## 2024-02-09 RX ADMIN — Medication 1 CAPSULE: at 09:07

## 2024-02-09 RX ADMIN — METOCLOPRAMIDE 5 MG: 10 TABLET ORAL at 05:59

## 2024-02-09 RX ADMIN — ACETAMINOPHEN 650 MG: 325 TABLET ORAL at 11:53

## 2024-02-09 RX ADMIN — LABETALOL HYDROCHLORIDE 100 MG: 100 TABLET, FILM COATED ORAL at 21:07

## 2024-02-09 RX ADMIN — POTASSIUM CHLORIDE 40 MEQ: 750 TABLET, EXTENDED RELEASE ORAL at 09:02

## 2024-02-09 RX ADMIN — LABETALOL HYDROCHLORIDE 100 MG: 100 TABLET, FILM COATED ORAL at 09:04

## 2024-02-09 RX ADMIN — PIPERACILLIN AND TAZOBACTAM 3375 MG: 3; .375 INJECTION, POWDER, FOR SOLUTION INTRAVENOUS at 01:11

## 2024-02-09 RX ADMIN — PIPERACILLIN AND TAZOBACTAM 3375 MG: 3; .375 INJECTION, POWDER, FOR SOLUTION INTRAVENOUS at 08:58

## 2024-02-09 RX ADMIN — SODIUM CHLORIDE, PRESERVATIVE FREE 10 ML: 5 INJECTION INTRAVENOUS at 09:10

## 2024-02-09 RX ADMIN — METOCLOPRAMIDE 5 MG: 10 TABLET ORAL at 13:49

## 2024-02-09 RX ADMIN — AMLODIPINE BESYLATE 5 MG: 5 TABLET ORAL at 09:04

## 2024-02-09 RX ADMIN — ACETAMINOPHEN 650 MG: 325 TABLET ORAL at 21:18

## 2024-02-09 RX ADMIN — FAMOTIDINE 20 MG: 20 TABLET, FILM COATED ORAL at 09:05

## 2024-02-09 NOTE — CARE COORDINATION
CM Note:  Requested [facility] to start auth.  Dietitian following--advance diet as tolerated  Nephro following  IVY Figueroa

## 2024-02-10 LAB
ANION GAP SERPL CALC-SCNC: 6 MMOL/L (ref 5–15)
BASOPHILS # BLD: 0 K/UL (ref 0–0.1)
BASOPHILS NFR BLD: 0 % (ref 0–1)
BUN SERPL-MCNC: 52 MG/DL (ref 6–20)
BUN/CREAT SERPL: 26 (ref 12–20)
CALCIUM SERPL-MCNC: 7.4 MG/DL (ref 8.5–10.1)
CHLORIDE SERPL-SCNC: 109 MMOL/L (ref 97–108)
CO2 SERPL-SCNC: 27 MMOL/L (ref 21–32)
CREAT SERPL-MCNC: 1.99 MG/DL (ref 0.7–1.3)
DIFFERENTIAL METHOD BLD: ABNORMAL
EOSINOPHIL # BLD: 0 K/UL (ref 0–0.4)
EOSINOPHIL NFR BLD: 0 % (ref 0–7)
ERYTHROCYTE [DISTWIDTH] IN BLOOD BY AUTOMATED COUNT: 15.4 % (ref 11.5–14.5)
GLUCOSE SERPL-MCNC: 109 MG/DL (ref 65–100)
HCT VFR BLD AUTO: 22.4 % (ref 36.6–50.3)
HCT VFR BLD AUTO: 24 % (ref 36.6–50.3)
HGB BLD-MCNC: 7.1 G/DL (ref 12.1–17)
HGB BLD-MCNC: 7.7 G/DL (ref 12.1–17)
IMM GRANULOCYTES # BLD AUTO: 0 K/UL
IMM GRANULOCYTES NFR BLD AUTO: 0 %
LYMPHOCYTES # BLD: 1.4 K/UL (ref 0.8–3.5)
LYMPHOCYTES NFR BLD: 10 % (ref 12–49)
MAGNESIUM SERPL-MCNC: 2 MG/DL (ref 1.6–2.4)
MCH RBC QN AUTO: 29.1 PG (ref 26–34)
MCHC RBC AUTO-ENTMCNC: 31.7 G/DL (ref 30–36.5)
MCV RBC AUTO: 91.8 FL (ref 80–99)
METAMYELOCYTES NFR BLD MANUAL: 3 %
MONOCYTES # BLD: 1.4 K/UL (ref 0–1)
MONOCYTES NFR BLD: 10 % (ref 5–13)
NEUTS BAND NFR BLD MANUAL: 1 % (ref 0–6)
NEUTS SEG # BLD: 10.9 K/UL (ref 1.8–8)
NEUTS SEG NFR BLD: 76 % (ref 32–75)
NRBC # BLD: 0 K/UL (ref 0–0.01)
NRBC BLD-RTO: 0 PER 100 WBC
PLATELET # BLD AUTO: 265 K/UL (ref 150–400)
PMV BLD AUTO: 9.8 FL (ref 8.9–12.9)
POTASSIUM SERPL-SCNC: 3.2 MMOL/L (ref 3.5–5.1)
RBC # BLD AUTO: 2.44 M/UL (ref 4.1–5.7)
RBC MORPH BLD: ABNORMAL
SODIUM SERPL-SCNC: 142 MMOL/L (ref 136–145)
WBC # BLD AUTO: 14.1 K/UL (ref 4.1–11.1)

## 2024-02-10 PROCEDURE — 36415 COLL VENOUS BLD VENIPUNCTURE: CPT

## 2024-02-10 PROCEDURE — 80048 BASIC METABOLIC PNL TOTAL CA: CPT

## 2024-02-10 PROCEDURE — 6370000000 HC RX 637 (ALT 250 FOR IP): Performed by: INTERNAL MEDICINE

## 2024-02-10 PROCEDURE — 85014 HEMATOCRIT: CPT

## 2024-02-10 PROCEDURE — 6360000002 HC RX W HCPCS: Performed by: INTERNAL MEDICINE

## 2024-02-10 PROCEDURE — 85025 COMPLETE CBC W/AUTO DIFF WBC: CPT

## 2024-02-10 PROCEDURE — 83735 ASSAY OF MAGNESIUM: CPT

## 2024-02-10 PROCEDURE — 85018 HEMOGLOBIN: CPT

## 2024-02-10 PROCEDURE — 2580000003 HC RX 258: Performed by: INTERNAL MEDICINE

## 2024-02-10 PROCEDURE — 6370000000 HC RX 637 (ALT 250 FOR IP): Performed by: HOSPITALIST

## 2024-02-10 PROCEDURE — 2060000000 HC ICU INTERMEDIATE R&B

## 2024-02-10 PROCEDURE — 94761 N-INVAS EAR/PLS OXIMETRY MLT: CPT

## 2024-02-10 RX ORDER — POTASSIUM CHLORIDE 750 MG/1
40 TABLET, FILM COATED, EXTENDED RELEASE ORAL ONCE
Status: COMPLETED | OUTPATIENT
Start: 2024-02-10 | End: 2024-02-10

## 2024-02-10 RX ADMIN — POTASSIUM CHLORIDE 40 MEQ: 750 TABLET, EXTENDED RELEASE ORAL at 11:03

## 2024-02-10 RX ADMIN — METOCLOPRAMIDE 5 MG: 10 TABLET ORAL at 14:43

## 2024-02-10 RX ADMIN — SODIUM CHLORIDE, PRESERVATIVE FREE 10 ML: 5 INJECTION INTRAVENOUS at 09:06

## 2024-02-10 RX ADMIN — SODIUM CHLORIDE, PRESERVATIVE FREE 10 ML: 5 INJECTION INTRAVENOUS at 20:20

## 2024-02-10 RX ADMIN — ACETAMINOPHEN 650 MG: 325 TABLET ORAL at 20:20

## 2024-02-10 RX ADMIN — PIPERACILLIN AND TAZOBACTAM 3375 MG: 3; .375 INJECTION, POWDER, FOR SOLUTION INTRAVENOUS at 00:58

## 2024-02-10 RX ADMIN — PIPERACILLIN AND TAZOBACTAM 3375 MG: 3; .375 INJECTION, POWDER, FOR SOLUTION INTRAVENOUS at 06:54

## 2024-02-10 RX ADMIN — PREDNISONE 10 MG: 10 TABLET ORAL at 09:06

## 2024-02-10 RX ADMIN — METOCLOPRAMIDE 5 MG: 10 TABLET ORAL at 20:20

## 2024-02-10 RX ADMIN — ACETAMINOPHEN 650 MG: 325 TABLET ORAL at 04:24

## 2024-02-10 RX ADMIN — METOCLOPRAMIDE 5 MG: 10 TABLET ORAL at 06:50

## 2024-02-10 RX ADMIN — Medication 1 CAPSULE: at 06:50

## 2024-02-10 RX ADMIN — LABETALOL HYDROCHLORIDE 100 MG: 100 TABLET, FILM COATED ORAL at 09:06

## 2024-02-10 RX ADMIN — LABETALOL HYDROCHLORIDE 100 MG: 100 TABLET, FILM COATED ORAL at 20:20

## 2024-02-10 RX ADMIN — FAMOTIDINE 20 MG: 20 TABLET, FILM COATED ORAL at 09:06

## 2024-02-10 RX ADMIN — PANTOPRAZOLE SODIUM 40 MG: 40 TABLET, DELAYED RELEASE ORAL at 06:51

## 2024-02-10 RX ADMIN — AMLODIPINE BESYLATE 5 MG: 5 TABLET ORAL at 09:06

## 2024-02-11 LAB
ANION GAP SERPL CALC-SCNC: 6 MMOL/L (ref 5–15)
BASOPHILS # BLD: 0 K/UL (ref 0–0.1)
BASOPHILS NFR BLD: 0 % (ref 0–1)
BUN SERPL-MCNC: 45 MG/DL (ref 6–20)
BUN/CREAT SERPL: 25 (ref 12–20)
CALCIUM SERPL-MCNC: 8.3 MG/DL (ref 8.5–10.1)
CHLORIDE SERPL-SCNC: 110 MMOL/L (ref 97–108)
CO2 SERPL-SCNC: 25 MMOL/L (ref 21–32)
COMMENT:: NORMAL
CREAT SERPL-MCNC: 1.81 MG/DL (ref 0.7–1.3)
DIFFERENTIAL METHOD BLD: ABNORMAL
EOSINOPHIL # BLD: 0.2 K/UL (ref 0–0.4)
EOSINOPHIL NFR BLD: 1 % (ref 0–7)
ERYTHROCYTE [DISTWIDTH] IN BLOOD BY AUTOMATED COUNT: 15.5 % (ref 11.5–14.5)
GLUCOSE BLD STRIP.AUTO-MCNC: 124 MG/DL (ref 65–117)
GLUCOSE SERPL-MCNC: 110 MG/DL (ref 65–100)
HCT VFR BLD AUTO: 23.6 % (ref 36.6–50.3)
HGB BLD-MCNC: 7.8 G/DL (ref 12.1–17)
IMM GRANULOCYTES # BLD AUTO: 0 K/UL
IMM GRANULOCYTES NFR BLD AUTO: 0 %
LYMPHOCYTES # BLD: 1.2 K/UL (ref 0.8–3.5)
LYMPHOCYTES NFR BLD: 8 % (ref 12–49)
MCH RBC QN AUTO: 30 PG (ref 26–34)
MCHC RBC AUTO-ENTMCNC: 33.1 G/DL (ref 30–36.5)
MCV RBC AUTO: 90.8 FL (ref 80–99)
METAMYELOCYTES NFR BLD MANUAL: 4 %
MONOCYTES # BLD: 0.9 K/UL (ref 0–1)
MONOCYTES NFR BLD: 6 % (ref 5–13)
NEUTS BAND NFR BLD MANUAL: 2 % (ref 0–6)
NEUTS SEG # BLD: 12.4 K/UL (ref 1.8–8)
NEUTS SEG NFR BLD: 79 % (ref 32–75)
NRBC # BLD: 0.04 K/UL (ref 0–0.01)
NRBC BLD-RTO: 0.3 PER 100 WBC
PLATELET # BLD AUTO: 303 K/UL (ref 150–400)
PMV BLD AUTO: 9.2 FL (ref 8.9–12.9)
POTASSIUM SERPL-SCNC: 3.3 MMOL/L (ref 3.5–5.1)
RBC # BLD AUTO: 2.6 M/UL (ref 4.1–5.7)
RBC MORPH BLD: ABNORMAL
SERVICE CMNT-IMP: ABNORMAL
SODIUM SERPL-SCNC: 141 MMOL/L (ref 136–145)
SPECIMEN HOLD: NORMAL
WBC # BLD AUTO: 15.3 K/UL (ref 4.1–11.1)

## 2024-02-11 PROCEDURE — 6370000000 HC RX 637 (ALT 250 FOR IP): Performed by: INTERNAL MEDICINE

## 2024-02-11 PROCEDURE — 82962 GLUCOSE BLOOD TEST: CPT

## 2024-02-11 PROCEDURE — 80048 BASIC METABOLIC PNL TOTAL CA: CPT

## 2024-02-11 PROCEDURE — 85025 COMPLETE CBC W/AUTO DIFF WBC: CPT

## 2024-02-11 PROCEDURE — 2580000003 HC RX 258: Performed by: INTERNAL MEDICINE

## 2024-02-11 PROCEDURE — 2060000000 HC ICU INTERMEDIATE R&B

## 2024-02-11 PROCEDURE — 6370000000 HC RX 637 (ALT 250 FOR IP): Performed by: HOSPITALIST

## 2024-02-11 PROCEDURE — 36415 COLL VENOUS BLD VENIPUNCTURE: CPT

## 2024-02-11 PROCEDURE — 94761 N-INVAS EAR/PLS OXIMETRY MLT: CPT

## 2024-02-11 RX ORDER — POTASSIUM CHLORIDE 750 MG/1
40 TABLET, FILM COATED, EXTENDED RELEASE ORAL ONCE
Status: COMPLETED | OUTPATIENT
Start: 2024-02-11 | End: 2024-02-11

## 2024-02-11 RX ADMIN — METOCLOPRAMIDE 5 MG: 10 TABLET ORAL at 06:37

## 2024-02-11 RX ADMIN — LABETALOL HYDROCHLORIDE 100 MG: 100 TABLET, FILM COATED ORAL at 08:36

## 2024-02-11 RX ADMIN — AMLODIPINE BESYLATE 5 MG: 5 TABLET ORAL at 08:35

## 2024-02-11 RX ADMIN — METOCLOPRAMIDE 5 MG: 10 TABLET ORAL at 20:59

## 2024-02-11 RX ADMIN — POTASSIUM CHLORIDE 40 MEQ: 750 TABLET, EXTENDED RELEASE ORAL at 10:34

## 2024-02-11 RX ADMIN — METOCLOPRAMIDE 5 MG: 10 TABLET ORAL at 12:06

## 2024-02-11 RX ADMIN — SODIUM CHLORIDE, PRESERVATIVE FREE 10 ML: 5 INJECTION INTRAVENOUS at 21:00

## 2024-02-11 RX ADMIN — SODIUM CHLORIDE, PRESERVATIVE FREE 10 ML: 5 INJECTION INTRAVENOUS at 08:38

## 2024-02-11 RX ADMIN — FAMOTIDINE 20 MG: 20 TABLET, FILM COATED ORAL at 08:35

## 2024-02-11 RX ADMIN — Medication 1 CAPSULE: at 08:36

## 2024-02-11 RX ADMIN — PREDNISONE 10 MG: 10 TABLET ORAL at 08:36

## 2024-02-11 RX ADMIN — ACETAMINOPHEN 650 MG: 325 TABLET ORAL at 21:04

## 2024-02-11 RX ADMIN — LABETALOL HYDROCHLORIDE 100 MG: 100 TABLET, FILM COATED ORAL at 20:59

## 2024-02-11 RX ADMIN — APIXABAN 5 MG: 5 TABLET, FILM COATED ORAL at 20:59

## 2024-02-11 RX ADMIN — PANTOPRAZOLE SODIUM 40 MG: 40 TABLET, DELAYED RELEASE ORAL at 06:38

## 2024-02-12 VITALS
TEMPERATURE: 98.4 F | HEIGHT: 71 IN | DIASTOLIC BLOOD PRESSURE: 76 MMHG | BODY MASS INDEX: 30.13 KG/M2 | SYSTOLIC BLOOD PRESSURE: 129 MMHG | OXYGEN SATURATION: 98 % | RESPIRATION RATE: 19 BRPM | WEIGHT: 215.2 LBS | HEART RATE: 89 BPM

## 2024-02-12 LAB
ANION GAP SERPL CALC-SCNC: 6 MMOL/L (ref 5–15)
BASOPHILS # BLD: 0 K/UL (ref 0–0.1)
BASOPHILS NFR BLD: 0 % (ref 0–1)
BUN SERPL-MCNC: 48 MG/DL (ref 6–20)
BUN/CREAT SERPL: 28 (ref 12–20)
CALCIUM SERPL-MCNC: 8.1 MG/DL (ref 8.5–10.1)
CHLORIDE SERPL-SCNC: 109 MMOL/L (ref 97–108)
CO2 SERPL-SCNC: 24 MMOL/L (ref 21–32)
DIFFERENTIAL METHOD BLD: ABNORMAL
EOSINOPHIL # BLD: 0 K/UL (ref 0–0.4)
EOSINOPHIL NFR BLD: 0 % (ref 0–7)
ERYTHROCYTE [DISTWIDTH] IN BLOOD BY AUTOMATED COUNT: 15.7 % (ref 11.5–14.5)
GLUCOSE SERPL-MCNC: 158 MG/DL (ref 65–100)
HCT VFR BLD AUTO: 23.4 % (ref 36.6–50.3)
HGB BLD-MCNC: 7.5 G/DL (ref 12.1–17)
IMM GRANULOCYTES NFR BLD AUTO: 0 %
LYMPHOCYTES # BLD: 1.1 K/UL (ref 0.8–3.5)
LYMPHOCYTES NFR BLD: 7 % (ref 12–49)
MCH RBC QN AUTO: 29.4 PG (ref 26–34)
MCHC RBC AUTO-ENTMCNC: 32.1 G/DL (ref 30–36.5)
MCV RBC AUTO: 91.8 FL (ref 80–99)
METAMYELOCYTES NFR BLD MANUAL: 1 %
MONOCYTES # BLD: 1 K/UL (ref 0–1)
MONOCYTES NFR BLD: 6 % (ref 5–13)
NEUTS BAND NFR BLD MANUAL: 2 % (ref 0–6)
NEUTS SEG # BLD: 13.8 K/UL (ref 1.8–8)
NEUTS SEG NFR BLD: 84 % (ref 32–75)
NRBC # BLD: 0.02 K/UL (ref 0–0.01)
NRBC BLD-RTO: 0.1 PER 100 WBC
PLATELET # BLD AUTO: 303 K/UL (ref 150–400)
PMV BLD AUTO: 9.4 FL (ref 8.9–12.9)
POTASSIUM SERPL-SCNC: 3.4 MMOL/L (ref 3.5–5.1)
RBC # BLD AUTO: 2.55 M/UL (ref 4.1–5.7)
RBC MORPH BLD: ABNORMAL
SODIUM SERPL-SCNC: 139 MMOL/L (ref 136–145)
WBC # BLD AUTO: 16 K/UL (ref 4.1–11.1)

## 2024-02-12 PROCEDURE — 80048 BASIC METABOLIC PNL TOTAL CA: CPT

## 2024-02-12 PROCEDURE — 6370000000 HC RX 637 (ALT 250 FOR IP): Performed by: INTERNAL MEDICINE

## 2024-02-12 PROCEDURE — 2580000003 HC RX 258: Performed by: INTERNAL MEDICINE

## 2024-02-12 PROCEDURE — 85025 COMPLETE CBC W/AUTO DIFF WBC: CPT

## 2024-02-12 PROCEDURE — 36415 COLL VENOUS BLD VENIPUNCTURE: CPT

## 2024-02-12 PROCEDURE — 97535 SELF CARE MNGMENT TRAINING: CPT

## 2024-02-12 PROCEDURE — 97530 THERAPEUTIC ACTIVITIES: CPT

## 2024-02-12 PROCEDURE — 94761 N-INVAS EAR/PLS OXIMETRY MLT: CPT

## 2024-02-12 PROCEDURE — 6370000000 HC RX 637 (ALT 250 FOR IP): Performed by: HOSPITALIST

## 2024-02-12 RX ORDER — LABETALOL 100 MG/1
100 TABLET, FILM COATED ORAL EVERY 12 HOURS SCHEDULED
Qty: 60 TABLET | Refills: 3 | Status: SHIPPED | OUTPATIENT
Start: 2024-02-12

## 2024-02-12 RX ORDER — PANTOPRAZOLE SODIUM 40 MG/1
40 TABLET, DELAYED RELEASE ORAL
Qty: 30 TABLET | Refills: 3 | Status: SHIPPED | OUTPATIENT
Start: 2024-02-13

## 2024-02-12 RX ORDER — AMLODIPINE BESYLATE 5 MG/1
5 TABLET ORAL DAILY
Qty: 30 TABLET | Refills: 3 | Status: SHIPPED | OUTPATIENT
Start: 2024-02-13

## 2024-02-12 RX ORDER — METOCLOPRAMIDE 5 MG/1
5 TABLET ORAL EVERY 8 HOURS
Qty: 9 TABLET | Refills: 0 | Status: SHIPPED | OUTPATIENT
Start: 2024-02-12 | End: 2024-02-15

## 2024-02-12 RX ORDER — LACTOBACILLUS RHAMNOSUS GG 10B CELL
1 CAPSULE ORAL
Qty: 30 CAPSULE | Refills: 0 | Status: SHIPPED | OUTPATIENT
Start: 2024-02-13 | End: 2024-03-14

## 2024-02-12 RX ADMIN — ACETAMINOPHEN 650 MG: 325 TABLET ORAL at 08:29

## 2024-02-12 RX ADMIN — SODIUM CHLORIDE, PRESERVATIVE FREE 10 ML: 5 INJECTION INTRAVENOUS at 08:31

## 2024-02-12 RX ADMIN — APIXABAN 5 MG: 5 TABLET, FILM COATED ORAL at 08:30

## 2024-02-12 RX ADMIN — LABETALOL HYDROCHLORIDE 100 MG: 100 TABLET, FILM COATED ORAL at 08:30

## 2024-02-12 RX ADMIN — PREDNISONE 10 MG: 10 TABLET ORAL at 08:29

## 2024-02-12 RX ADMIN — METOCLOPRAMIDE 5 MG: 10 TABLET ORAL at 05:45

## 2024-02-12 RX ADMIN — FAMOTIDINE 20 MG: 20 TABLET, FILM COATED ORAL at 08:29

## 2024-02-12 RX ADMIN — Medication 1 CAPSULE: at 08:30

## 2024-02-12 RX ADMIN — METOCLOPRAMIDE 5 MG: 10 TABLET ORAL at 12:34

## 2024-02-12 RX ADMIN — AMLODIPINE BESYLATE 5 MG: 5 TABLET ORAL at 08:30

## 2024-02-12 RX ADMIN — PANTOPRAZOLE SODIUM 40 MG: 40 TABLET, DELAYED RELEASE ORAL at 05:45

## 2024-02-12 NOTE — PROGRESS NOTES
[facility]  [address] [place], [place] [address] [phone]      Hospitalist  Progress Note      NAME:       Tim Morales Sr.   :        1955  MRM:        [id number]    Date of service: 2024      Subjective: Patient seen and examined by me. Patient admitted with SBO. He says he has nausea with worsening abdominal distention. No vomiting. HR variable. Now with billious effluent from NG tube.     Objective:    Vital Signs:    BP (!) 180/99   Pulse (!) 123   Temp 98.9 °F (37.2 °C) (Oral)   Resp 18   Ht 1.803 m (5' 11\")   Wt 100.7 kg (222 lb)   SpO2 93%   BMI 30.96 kg/m²        Intake/Output Summary (Last 24 hours) at 2024 07  Last data filed at 2024 0614  Gross per 24 hour   Intake --   Output 2000 ml   Net -2000 ml          Current inpatient medications reviewed:  Current Facility-Administered Medications   Medication Dose Route Frequency    heparin 25,000 units in dextrose 5% 250 mL (premix) infusion  5-30 Units/kg/hr IntraVENous Continuous    lactated ringers IV soln infusion   IntraVENous Continuous    piperacillin-tazobactam (ZOSYN) 3,375 mg in sodium chloride 0.9 % 50 mL IVPB (mini-bag)  3,375 mg IntraVENous Q8H    HYDROmorphone (DILAUDID) injection 0.5 mg  0.5 mg IntraVENous Q3H PRN    Or    HYDROmorphone HCl PF (DILAUDID) injection 1 mg  1 mg IntraVENous Q3H PRN    famotidine (PEPCID) 20 mg in sodium chloride (PF) 0.9 % 10 mL injection  20 mg IntraVENous Daily    heparin (porcine) injection 4,000 Units  4,000 Units IntraVENous PRN    heparin (porcine) injection 2,000 Units  2,000 Units IntraVENous PRN    dilTIAZem 100 mg in sodium chloride 0.9 % 100 mL infusion (ADD-[place])  5 mg/hr IntraVENous Continuous    dextrose 5 % solution   IntraVENous Continuous    [Held by provider] predniSONE (DELTASONE) tablet 10 mg  10 mg Oral Daily    labetalol (NORMODYNE;TRANDATE) injection 10 mg  
                                                             FUAD MARIE [facility]  [address] [place], [place] [id number] [phone]      Hospitalist  Progress Note      NAME:       Tim Morales Sr.   :        1955  MRM:        [id number]    Date of service: 2/3/2024      Subjective: Patient seen and examined by me. Patient admitted with SBO. He says he has nausea with worsening abdominal distention. No vomiting. He remains distended with persistent NG tube output. No fever or chills.      Objective:    Vital Signs:    BP (!) 158/93   Pulse 82   Temp 98.9 °F (37.2 °C) (Oral)   Resp 18   Ht 1.803 m (5' 11\")   Wt 100.7 kg (222 lb)   SpO2 93%   BMI 30.96 kg/m²        Intake/Output Summary (Last 24 hours) at 2/3/2024 0821  Last data filed at 2/3/2024 0545  Gross per 24 hour   Intake 125 ml   Output 3700 ml   Net -3575 ml          Current inpatient medications reviewed:  Current Facility-Administered Medications   Medication Dose Route Frequency    PN-Adult Premix  4.25/10 - Standard Electrolytes - Peripheral Line   IntraVENous Continuous TPN    heparin 25,000 units in dextrose 5% 250 mL (premix) infusion  5-30 Units/kg/hr IntraVENous Continuous    lactated ringers IV soln infusion   IntraVENous Continuous    piperacillin-tazobactam (ZOSYN) 3,375 mg in sodium chloride 0.9 % 50 mL IVPB (mini-bag)  3,375 mg IntraVENous Q8H    HYDROmorphone (DILAUDID) injection 0.5 mg  0.5 mg IntraVENous Q3H PRN    Or    HYDROmorphone HCl PF (DILAUDID) injection 1 mg  1 mg IntraVENous Q3H PRN    famotidine (PEPCID) 20 mg in sodium chloride (PF) 0.9 % 10 mL injection  20 mg IntraVENous Daily    heparin (porcine) injection 4,000 Units  4,000 Units IntraVENous PRN    heparin (porcine) injection 2,000 Units  2,000 Units IntraVENous PRN    dilTIAZem 100 mg in sodium chloride 0.9 % 100 mL infusion (ADD-[place])  10 mg/hr IntraVENous Continuous    [Held by provider] predniSONE (DELTASONE) tablet 10 mg  
                                                             FUAD MARIE [facility]  [address] [place] [address]  [phone]      Hospitalist  Progress Note      NAME:       Tim Morales Sr.   :        1955  MRM:        [id number]    Date of service: 2024      Subjective: Patient seen and examined by me. Patient admitted with SBO. He says he has nausea with worsening abdominal distention. No vomiting. HR better controlled. Afebrile.      Objective:    Vital Signs:    BP (!) 168/87   Pulse (!) 104   Temp 98.6 °F (37 °C) (Oral)   Resp 21   Ht 1.803 m (5' 11\")   Wt 100.8 kg (222 lb 3.2 oz)   SpO2 91%   BMI 30.99 kg/m²        Intake/Output Summary (Last 24 hours) at 2024 0804  Last data filed at 2024 0700  Gross per 24 hour   Intake 5763.08 ml   Output 520 ml   Net 5243.08 ml        Current inpatient medications reviewed:  Current Facility-Administered Medications   Medication Dose Route Frequency    lactated ringers IV soln infusion   IntraVENous Continuous    piperacillin-tazobactam (ZOSYN) 3,375 mg in sodium chloride 0.9 % 50 mL IVPB (mini-bag)  3,375 mg IntraVENous Q8H    HYDROmorphone (DILAUDID) injection 0.5 mg  0.5 mg IntraVENous Q3H PRN    Or    HYDROmorphone HCl PF (DILAUDID) injection 1 mg  1 mg IntraVENous Q3H PRN    famotidine (PEPCID) 20 mg in sodium chloride (PF) 0.9 % 10 mL injection  20 mg IntraVENous Daily    heparin (porcine) injection 4,000 Units  4,000 Units IntraVENous PRN    heparin (porcine) injection 2,000 Units  2,000 Units IntraVENous PRN    [Held by provider] heparin 25,000 units in dextrose 5% 250 mL (premix) infusion  5-30 Units/kg/hr IntraVENous Continuous    dilTIAZem 100 mg in sodium chloride 0.9 % 100 mL infusion (ADD-[place])  5 mg/hr IntraVENous Continuous    dextrose 5 % solution   IntraVENous Continuous    [Held by provider] predniSONE (DELTASONE) tablet 10 mg  10 mg Oral Daily    labetalol (NORMODYNE;TRANDATE) 
                            [facility]    [facility] Sr[facility]  YOB: 1955          Assessment & Plan:     CHETNA likely due to prerenal azotemia in the setting of IVVD and SBO  Severe hypokalemia  Metabolic alkalosis   CKD stage G3   -cr 1.48 from 03/21  HTN  SBO    Rec:  Creat improving to baseline  No acute indication HD       Subjective:   CC: CHETNA  HPI: On NGT and TPN. Cr improving over weekend    Current Facility-Administered Medications   Medication Dose Route Frequency    potassium chloride 10 mEq/100 mL IVPB (Peripheral Line)  10 mEq IntraVENous Q2H    prochlorperazine (COMPAZINE) injection 10 mg  10 mg IntraVENous Q6H PRN    PN-Adult Premix  4.25/10 - Standard Electrolytes - Peripheral Line   IntraVENous Continuous TPN    enoxaparin (LOVENOX) injection 100 mg  1 mg/kg SubCUTAneous BID    piperacillin-tazobactam (ZOSYN) 3,375 mg in sodium chloride 0.9 % 50 mL IVPB (mini-bag)  3,375 mg IntraVENous Q8H    HYDROmorphone (DILAUDID) injection 0.5 mg  0.5 mg IntraVENous Q3H PRN    Or    HYDROmorphone HCl PF (DILAUDID) injection 1 mg  1 mg IntraVENous Q3H PRN    famotidine (PEPCID) 20 mg in sodium chloride (PF) 0.9 % 10 mL injection  20 mg IntraVENous Daily    dilTIAZem 100 mg in sodium chloride 0.9 % 100 mL infusion (ADD-[place])  15 mg/hr IntraVENous Continuous    [Held by provider] predniSONE (DELTASONE) tablet 10 mg  10 mg Oral Daily    labetalol (NORMODYNE;TRANDATE) injection 10 mg  10 mg IntraVENous Q8H PRN    LORazepam (ATIVAN) injection 0.5 mg  0.5 mg IntraVENous Q4H PRN    sodium chloride flush 0.9 % injection 5-40 mL  5-40 mL IntraVENous 2 times per day    sodium chloride flush 0.9 % injection 5-40 mL  5-40 mL IntraVENous PRN    0.9 % sodium chloride infusion   IntraVENous PRN    ondansetron (ZOFRAN-ODT) disintegrating tablet 4 mg  4 mg Oral Q8H PRN    Or    ondansetron (ZOFRAN) injection 4 mg  4 mg IntraVENous Q6H PRN    polyethylene glycol (GLYCOLAX) packet 17 g  17 
                            [facility]    Tim Morales Sr.  YOB: 1955          Assessment & Plan:     CHETNA likely due to prerenal azotemia in the setting of IVVD and SBO  Severe hypokalemia  Metabolic alkalosis   CKD stage G3   -cr 1.48 from 03/21  HTN  SBO s/p ExLap MINERVA  AFib    Rec:  Creat improving to baseline  Seems to have less NG output  No acute indication HD       Subjective:   CC: CHETNA  HPI: On NGT and TPN. NG output improving. No sob. Pt frustrated by slow progress.    Current Facility-Administered Medications   Medication Dose Route Frequency    labetalol (NORMODYNE;TRANDATE) injection 20 mg  20 mg IntraVENous Q4H PRN    insulin lispro (HUMALOG) injection vial 0-8 Units  0-8 Units SubCUTAneous Q6H    PN-Adult Premix 5/15 - Standard Electrolytes - Central Line   IntraVENous Continuous TPN    prochlorperazine (COMPAZINE) injection 10 mg  10 mg IntraVENous Q6H PRN    PN-Adult Premix 5/15 - Standard Electrolytes - Central Line   IntraVENous Continuous TPN    fat emulsion (INTRALIPID/NUTRILIPID) 20 % infusion 250 mL  250 mL IntraVENous Q MWF    famotidine (PEPCID) 20 mg in sodium chloride (PF) 0.9 % 10 mL injection  20 mg IntraVENous Daily    pantoprazole (PROTONIX) 40 mg in sodium chloride (PF) 0.9 % 10 mL injection  40 mg IntraVENous QAM AC    bisacodyl (DULCOLAX) suppository 10 mg  10 mg Rectal Daily    enoxaparin (LOVENOX) injection 100 mg  1 mg/kg SubCUTAneous BID    piperacillin-tazobactam (ZOSYN) 3,375 mg in sodium chloride 0.9 % 50 mL IVPB (mini-bag)  3,375 mg IntraVENous Q8H    HYDROmorphone (DILAUDID) injection 0.5 mg  0.5 mg IntraVENous Q3H PRN    Or    HYDROmorphone HCl PF (DILAUDID) injection 1 mg  1 mg IntraVENous Q3H PRN    dilTIAZem 100 mg in sodium chloride 0.9 % 100 mL infusion (ADD-[place])  15 mg/hr IntraVENous Continuous    [Held by provider] predniSONE (DELTASONE) tablet 10 mg  10 mg Oral Daily    LORazepam (ATIVAN) injection 0.5 mg  0.5 mg 
                            FUAD MARIE [facility]    Tim Morales Sr.  YOB: 1955          Assessment & Plan:     CHETNA likely due to prerenal azotemia in the setting of IVVD and SBO  Severe hypokalemia  Metabolic alkalosis   CKD stage G3   -cr 1.48 from 03/21  HTN  SBO    Rec:  Change IVF to D5 1/2NS with KCL to give some saline  No acute indication HD  High urine K may be due to metabolic alkalosis or tubulopathy from ETOH  Would recheck when more stable       Subjective:   CC: CHETNA  HPI: Creat tending down. K is low. On NGT to WS.   ROS: Voiding well  Current Facility-Administered Medications   Medication Dose Route Frequency    famotidine (PEPCID) 20 mg in sodium chloride (PF) 0.9 % 10 mL injection  20 mg IntraVENous Daily    heparin (porcine) injection 4,000 Units  4,000 Units IntraVENous PRN    heparin (porcine) injection 2,000 Units  2,000 Units IntraVENous PRN    heparin 25,000 units in dextrose 5% 250 mL (premix) infusion  5-30 Units/kg/hr IntraVENous Continuous    dilTIAZem 100 mg in sodium chloride 0.9 % 100 mL infusion (ADD-[place])  5 mg/hr IntraVENous Continuous    dextrose 5 % solution   IntraVENous Continuous    [Held by provider] predniSONE (DELTASONE) tablet 10 mg  10 mg Oral Daily    labetalol (NORMODYNE;TRANDATE) injection 10 mg  10 mg IntraVENous Q8H PRN    LORazepam (ATIVAN) injection 0.5 mg  0.5 mg IntraVENous Q4H PRN    sodium chloride flush 0.9 % injection 5-40 mL  5-40 mL IntraVENous 2 times per day    sodium chloride flush 0.9 % injection 5-40 mL  5-40 mL IntraVENous PRN    0.9 % sodium chloride infusion   IntraVENous PRN    ondansetron (ZOFRAN-ODT) disintegrating tablet 4 mg  4 mg Oral Q8H PRN    Or    ondansetron (ZOFRAN) injection 4 mg  4 mg IntraVENous Q6H PRN    polyethylene glycol (GLYCOLAX) packet 17 g  17 g Oral Daily PRN    acetaminophen (TYLENOL) tablet 650 mg  650 mg Oral Q6H PRN    Or    acetaminophen (TYLENOL) suppository 650 mg  650 mg Rectal 
                            FUAD MARIE [facility]    Tim Morales Sr.  YOB: 1955          Assessment & Plan:     CHETNA likely due to prerenal azotemia in the setting of IVVD and SBO  Severe hypokalemia  Metabolic alkalosis   CKD stage G3   -cr 1.48 from 03/21  HTN  SBO    Rec:  Change IVF to D5 1/2NS with KCL to give some saline  No acute indication HD  High urine K may be due to metabolic alkalosis or tubulopathy from ETOH or other causes  Would recheck when more stable       Subjective:   CC: CHETNA  HPI: Still on NGT. Cr improving nicely. Persistent hypokalemia. He says he has been on K supplements for years.  ROS: Voiding well  Current Facility-Administered Medications   Medication Dose Route Frequency    potassium chloride 10 mEq/100 mL IVPB (Peripheral Line)  10 mEq IntraVENous Q1H    famotidine (PEPCID) 20 mg in sodium chloride (PF) 0.9 % 10 mL injection  20 mg IntraVENous Daily    heparin (porcine) injection 4,000 Units  4,000 Units IntraVENous PRN    heparin (porcine) injection 2,000 Units  2,000 Units IntraVENous PRN    heparin 25,000 units in dextrose 5% 250 mL (premix) infusion  5-30 Units/kg/hr IntraVENous Continuous    dilTIAZem 100 mg in sodium chloride 0.9 % 100 mL infusion (ADD-[place])  5 mg/hr IntraVENous Continuous    dextrose 5 % solution   IntraVENous Continuous    [Held by provider] predniSONE (DELTASONE) tablet 10 mg  10 mg Oral Daily    labetalol (NORMODYNE;TRANDATE) injection 10 mg  10 mg IntraVENous Q8H PRN    LORazepam (ATIVAN) injection 0.5 mg  0.5 mg IntraVENous Q4H PRN    sodium chloride flush 0.9 % injection 5-40 mL  5-40 mL IntraVENous 2 times per day    sodium chloride flush 0.9 % injection 5-40 mL  5-40 mL IntraVENous PRN    0.9 % sodium chloride infusion   IntraVENous PRN    ondansetron (ZOFRAN-ODT) disintegrating tablet 4 mg  4 mg Oral Q8H PRN    Or    ondansetron (ZOFRAN) injection 4 mg  4 mg IntraVENous Q6H PRN    polyethylene glycol (GLYCOLAX) 
                            FUAD MARIE [facility]    Tim Morales Sr.  YOB: 1955          Assessment & Plan:     CHETNA likely due to prerenal azotemia in the setting of IVVD and SBO  Hypokalemia  Metabolic alkalosis   CKD stage 3b? not previously f/b nephrology  -cr 1.48 from 03/21  HTN  SBO s/p ExLap MINERVA  AFib    Rec:  Creat improved and stable   Replete K  No acute indication HD  Daily labs and monitor of UOP       Subjective:   CC: CHETNA  HPI: cr 1.7<1.8 and K 3.4    Current Facility-Administered Medications   Medication Dose Route Frequency    apixaban (ELIQUIS) tablet 5 mg  5 mg Oral BID    silver nitrate applicators applicator 1 each  1 each Topical Once    silver nitrate applicators applicator 1 each  1 each Topical Once    famotidine (PEPCID) tablet 20 mg  20 mg Oral Daily    metoclopramide (REGLAN) tablet 5 mg  5 mg Oral q8h    pantoprazole (PROTONIX) tablet 40 mg  40 mg Oral QAM AC    lactobacillus (CULTURELLE) capsule 1 capsule  1 capsule Oral Daily with breakfast    amLODIPine (NORVASC) tablet 5 mg  5 mg Oral Daily    predniSONE (DELTASONE) tablet 10 mg  10 mg Oral Daily    labetalol (NORMODYNE;TRANDATE) injection 20 mg  20 mg IntraVENous Q4H PRN    labetalol (NORMODYNE) tablet 100 mg  100 mg Oral 2 times per day    prochlorperazine (COMPAZINE) injection 10 mg  10 mg IntraVENous Q6H PRN    HYDROmorphone (DILAUDID) injection 0.5 mg  0.5 mg IntraVENous Q3H PRN    Or    HYDROmorphone HCl PF (DILAUDID) injection 1 mg  1 mg IntraVENous Q3H PRN    LORazepam (ATIVAN) injection 0.5 mg  0.5 mg IntraVENous Q4H PRN    sodium chloride flush 0.9 % injection 5-40 mL  5-40 mL IntraVENous 2 times per day    sodium chloride flush 0.9 % injection 5-40 mL  5-40 mL IntraVENous PRN    0.9 % sodium chloride infusion   IntraVENous PRN    ondansetron (ZOFRAN-ODT) disintegrating tablet 4 mg  4 mg Oral Q8H PRN    Or    ondansetron (ZOFRAN) injection 4 mg  4 mg IntraVENous Q6H PRN    polyethylene 
                            [facility]AD MAR[facility]    Tim Morales Sr.  YOB: 1955          Assessment & Plan:     CHETNA likely due to prerenal azotemia in the setting of IVVD and SBO  Hypokalemia  Metabolic alkalosis   CKD stage 3b? not previously f/b nephrology  -cr 1.48 from 03/21  HTN  SBO s/p ExLap MINERVA  AFib    Rec:  Creat improved and stable ?at baseline  NGT out, advancing diet  Replete K  No acute indication HD       Subjective:   CC: CHETNA  HPI: Off tpn. Rohith POs. Renal function stable. K lower today.    Current Facility-Administered Medications   Medication Dose Route Frequency    silver nitrate applicators applicator 1 each  1 each Topical Once    silver nitrate applicators applicator 1 each  1 each Topical Once    famotidine (PEPCID) tablet 20 mg  20 mg Oral Daily    metoclopramide (REGLAN) tablet 5 mg  5 mg Oral q8h    pantoprazole (PROTONIX) tablet 40 mg  40 mg Oral QAM AC    lactobacillus (CULTURELLE) capsule 1 capsule  1 capsule Oral Daily with breakfast    amLODIPine (NORVASC) tablet 5 mg  5 mg Oral Daily    predniSONE (DELTASONE) tablet 10 mg  10 mg Oral Daily    labetalol (NORMODYNE;TRANDATE) injection 20 mg  20 mg IntraVENous Q4H PRN    insulin lispro (HUMALOG) injection vial 0-8 Units  0-8 Units SubCUTAneous Q6H    labetalol (NORMODYNE) tablet 100 mg  100 mg Oral 2 times per day    prochlorperazine (COMPAZINE) injection 10 mg  10 mg IntraVENous Q6H PRN    [Held by provider] enoxaparin (LOVENOX) injection 100 mg  1 mg/kg SubCUTAneous BID    piperacillin-tazobactam (ZOSYN) 3,375 mg in sodium chloride 0.9 % 50 mL IVPB (mini-bag)  3,375 mg IntraVENous Q8H    HYDROmorphone (DILAUDID) injection 0.5 mg  0.5 mg IntraVENous Q3H PRN    Or    HYDROmorphone HCl PF (DILAUDID) injection 1 mg  1 mg IntraVENous Q3H PRN    LORazepam (ATIVAN) injection 0.5 mg  0.5 mg IntraVENous Q4H PRN    sodium chloride flush 0.9 % injection 5-40 mL  5-40 mL IntraVENous 2 times per day    sodium 
                            FUAD MARIE [facility]    Tim Morales Sr.  YOB: 1955          Assessment & Plan:     CHETNA likely due to prerenal azotemia in the setting of IVVD and SBO  Severe hypokalemia  Metabolic alkalosis   CKD stage G3   -cr 1.48 from 03/21  HTN  SBO    Rec:  Continue IVF  Renal function stable  No acute indication HD  Potassium now stable       Subjective:   CC: CHETNA  HPI: On NGT and IVF. Creat stable. K normal.    Current Facility-Administered Medications   Medication Dose Route Frequency    heparin 25,000 units in dextrose 5% 250 mL (premix) infusion  5-30 Units/kg/hr IntraVENous Continuous    lactated ringers IV soln infusion   IntraVENous Continuous    piperacillin-tazobactam (ZOSYN) 3,375 mg in sodium chloride 0.9 % 50 mL IVPB (mini-bag)  3,375 mg IntraVENous Q8H    HYDROmorphone (DILAUDID) injection 0.5 mg  0.5 mg IntraVENous Q3H PRN    Or    HYDROmorphone HCl PF (DILAUDID) injection 1 mg  1 mg IntraVENous Q3H PRN    famotidine (PEPCID) 20 mg in sodium chloride (PF) 0.9 % 10 mL injection  20 mg IntraVENous Daily    heparin (porcine) injection 4,000 Units  4,000 Units IntraVENous PRN    heparin (porcine) injection 2,000 Units  2,000 Units IntraVENous PRN    dilTIAZem 100 mg in sodium chloride 0.9 % 100 mL infusion (ADD-[place])  10 mg/hr IntraVENous Continuous    [Held by provider] predniSONE (DELTASONE) tablet 10 mg  10 mg Oral Daily    labetalol (NORMODYNE;TRANDATE) injection 10 mg  10 mg IntraVENous Q8H PRN    LORazepam (ATIVAN) injection 0.5 mg  0.5 mg IntraVENous Q4H PRN    sodium chloride flush 0.9 % injection 5-40 mL  5-40 mL IntraVENous 2 times per day    sodium chloride flush 0.9 % injection 5-40 mL  5-40 mL IntraVENous PRN    0.9 % sodium chloride infusion   IntraVENous PRN    ondansetron (ZOFRAN-ODT) disintegrating tablet 4 mg  4 mg Oral Q8H PRN    Or    ondansetron (ZOFRAN) injection 4 mg  4 mg IntraVENous Q6H PRN    polyethylene glycol (GLYCOLAX) 
                            FUAD MARIE [facility]    Tim Morales Sr.  YOB: 1955          Assessment & Plan:     CHETNA likely due to prerenal azotemia in the setting of IVVD and SBO  Severe hypokalemia  Metabolic alkalosis   CKD stage G3   -cr 1.48 from 03/21  HTN  SBO    Rec:  On LR after surgery  Renal function stable  No acute indication HD  High urine K may be due to metabolic alkalosis or tubulopathy from ETOH or other causes  Would recheck when more stable       Subjective:   CC: CHETNA  HPI: Creat stable. Had exlap MINERVA yesterday but still on NGT to suction.    Current Facility-Administered Medications   Medication Dose Route Frequency    lactated ringers IV soln infusion   IntraVENous Continuous    piperacillin-tazobactam (ZOSYN) 3,375 mg in sodium chloride 0.9 % 50 mL IVPB (mini-bag)  3,375 mg IntraVENous Q8H    HYDROmorphone (DILAUDID) injection 0.5 mg  0.5 mg IntraVENous Q3H PRN    Or    HYDROmorphone HCl PF (DILAUDID) injection 1 mg  1 mg IntraVENous Q3H PRN    famotidine (PEPCID) 20 mg in sodium chloride (PF) 0.9 % 10 mL injection  20 mg IntraVENous Daily    heparin (porcine) injection 4,000 Units  4,000 Units IntraVENous PRN    heparin (porcine) injection 2,000 Units  2,000 Units IntraVENous PRN    [Held by provider] heparin 25,000 units in dextrose 5% 250 mL (premix) infusion  5-30 Units/kg/hr IntraVENous Continuous    dilTIAZem 100 mg in sodium chloride 0.9 % 100 mL infusion (ADD-[place])  5 mg/hr IntraVENous Continuous    dextrose 5 % solution   IntraVENous Continuous    [Held by provider] predniSONE (DELTASONE) tablet 10 mg  10 mg Oral Daily    labetalol (NORMODYNE;TRANDATE) injection 10 mg  10 mg IntraVENous Q8H PRN    LORazepam (ATIVAN) injection 0.5 mg  0.5 mg IntraVENous Q4H PRN    sodium chloride flush 0.9 % injection 5-40 mL  5-40 mL IntraVENous 2 times per day    sodium chloride flush 0.9 % injection 5-40 mL  5-40 mL IntraVENous PRN    0.9 % sodium chloride 
                            [facility]    Tim Morales Sr.  YOB: 1955          Assessment & Plan:     CHETNA likely due to prerenal azotemia in the setting of IVVD and SBO  Hypokalemia  Metabolic alkalosis   CKD stage 3b? not previously f/b nephrology  -cr 1.48 from 03/21  HTN  SBO s/p ExLap MINERVA  AFib    Rec:  Creat improved and stable ?at baseline  NGT out, taking some POs  Replete K  No acute indication HD       Subjective:   CC: CHETNA  HPI: Taking some POs. Still on TPN. Renal function stable    Current Facility-Administered Medications   Medication Dose Route Frequency    silver nitrate applicators applicator 1 each  1 each Topical Once    silver nitrate applicators applicator 1 each  1 each Topical Once    silver nitrate applicators applicator 1 each  1 each Topical Once    lactobacillus (CULTURELLE) capsule 1 capsule  1 capsule Oral Daily with breakfast    amLODIPine (NORVASC) tablet 5 mg  5 mg Oral Daily    PN-Adult Premix 5/15 - Standard Electrolytes - Central Line   IntraVENous Continuous TPN    predniSONE (DELTASONE) tablet 10 mg  10 mg Oral Daily    labetalol (NORMODYNE;TRANDATE) injection 20 mg  20 mg IntraVENous Q4H PRN    insulin lispro (HUMALOG) injection vial 0-8 Units  0-8 Units SubCUTAneous Q6H    metoclopramide (REGLAN) injection 5 mg  5 mg IntraVENous Q8H    labetalol (NORMODYNE) tablet 100 mg  100 mg Oral 2 times per day    prochlorperazine (COMPAZINE) injection 10 mg  10 mg IntraVENous Q6H PRN    famotidine (PEPCID) 20 mg in sodium chloride (PF) 0.9 % 10 mL injection  20 mg IntraVENous Daily    pantoprazole (PROTONIX) 40 mg in sodium chloride (PF) 0.9 % 10 mL injection  40 mg IntraVENous QAM AC    [Held by provider] enoxaparin (LOVENOX) injection 100 mg  1 mg/kg SubCUTAneous BID    piperacillin-tazobactam (ZOSYN) 3,375 mg in sodium chloride 0.9 % 50 mL IVPB (mini-bag)  3,375 mg IntraVENous Q8H    HYDROmorphone (DILAUDID) injection 0.5 mg  0.5 mg 
        NEPHROLOGY CONSULT NOTE     Patient: Tim Morales Sr. MRN: [id number]  PCP: Saurabh Goldstein MD   :     1955  Age:   68 y.o.  Sex:  male      Referring physician: Agustina Starks DO  Reason for consultation:   Admission Date: 2024  9:24 AM  LOS: 1 day        ASSESSMENT and PLAN :   CHETNA likely due to prerenal azotemia in the setting of IVVD and SBO  Severe hypokalemia  Metabolic alkalosis   CKD stage G3   -cr 1.48 from   HTN  SBO    Plan:  Cr/GFR better   Continue LR  Replete the K aggressively  Urine lytes reviewed  Urine K is up likely urinary loss  TTKG 20 (>3)   Check renin aldosterone level  Once cr stabilize then plan to start on K sparring diuretics   Check serial labs for low K  Need follow up outpatient              Subjective:   HPI:  CHETNA and hypokalemia  Cr better K is low 3.0 but better         Past Medical Hx:   Past Medical History:   Diagnosis Date    Hyperlipidemia     Hypertension         Past Surgical Hx:     Past Surgical History:   Procedure Laterality Date    ABDOMEN SURGERY      unknown reason    ANKLE SURGERY Right     bone spurs removed    COLONOSCOPY      COLONOSCOPY N/A 2023    COLONOSCOPY DIAGNOSTIC performed by Lalit Villavicencio Jr., MD at [facility] ENDOSCOPY    FRACTURE SURGERY Bilateral        Medications:  Prior to Admission medications    Medication Sig Start Date End Date Taking? Authorizing Provider   aspirin 325 MG tablet Take 1 tablet by mouth daily    Provider, MD Ulises   amLODIPine (NORVASC) 10 MG tablet ceived the following from [facility]: Outside name: amLODIPine (NORVASC) 10 mg tablet 21   Automatic Reconciliation, Ar   hydrALAZINE (APRESOLINE) 50 MG tablet 2 tablets 3/31/21   Automatic Reconciliation, Ar   magnesium oxide (MAG-OX) 400 MG tablet TAKE 1 TABLET BY MOUTH TWICE DAILY 20   Automatic Reconciliation, Ar   olmesartan (BENICAR) 40 MG tablet ceived the following from [facility]: Outside 
  Physician Progress Note      PATIENT:               STEW EDEN  CSN #:                  [id number]  :                       1955  ADMIT DATE:       2024 9:24 AM  DISCH DATE:  RESPONDING  PROVIDER #:        Kings GREEN Do, MD          QUERY TEXT:    Patient admitted with admitted with SBO, s/p exploratory laparotomy with MINERVA   and appendectomy with post-op ileus. Noted documentation of \"severe   malnutrition\" in 2/5 PN and \"moderate malnutrition\" in / RD assessment. If   possible, please document in progress notes and discharge summary if you are   evaluating and /or treating any of the following:    The medical record reflects the following:  Risk Factors: SBP with post-op ileus  - Clinical Indicators: admitted with SBO, s/p exploratory laparotomy with MINERVA   and appendectomy with post-op ileus  -  PN: Severe pro-yomi malnutrition: due to SBO.  Cont PPN (Nutrition   managing).  Will consult IR for central line for TPN.  -  RD: Malnutrition Status:  Moderate malnutrition (24 [id number])  Context:  Acute Illness  Findings of the 6 clinical characteristics of malnutrition:  Energy Intake:  50% or less of estimated energy requirements for 5 or more   days  Body Fat Loss:  Mild body fat loss Fat Overlying Ribs, Triceps  Treatment: RD assessment, TPN, monitoring    Thank you,    Amanda Morrison RN  CDI  Options provided:  -- Severe malnutrition confirmed and moderate malnutrition ruled out  -- Moderate malnutrition confirmed and severe malnutrition ruled out  -- Other - I will add my own diagnosis  -- Disagree - Not applicable / Not valid  -- Disagree - Clinically unable to determine / Unknown  -- Refer to Clinical Documentation Reviewer    PROVIDER RESPONSE TEXT:    After study, severe malnutrition confirmed, and moderate malnutrition ruled   out.    Query created by: Amanda Morrison on 2024 1:40 PM      Electronically signed by:  Kings GREEN Do, MD 2024 3:07 PM          
  Physician Progress Note      PATIENT:               STEW EDEN  CSN #:                  [id number]  :                       1955  ADMIT DATE:       2024 9:24 AM  DISCH DATE:  RESPONDING  PROVIDER #:        Dawson Robertson MD          QUERY TEXT:    Patient admitted with SBO.  Noted documentation of \"likely postoperative   ileus\" in  PN.  If possible, please document in progress notes and   discharge summary:    The medical record reflects the following:  Risk Factors: SBO s/p surgery  Clinical Indicators: admitted with SBO s/p  exploratory laparotomy with   MINERVA and appendectomy  -  PN: Now with likely post op ileus. Get a KUB. Keep NG tube suction.  -  KUB with mild small bowel dilation and fold thickening in the Right   upper quadrant  Treatment: XR, NGT, monitoring    Thank you,    Amanda Morrison RN  CDI  Options provided:  -- Likely ileus is a postoperative complication of  abdominal surgery  -- Likely ileus is not related to the procedure as the term postoperative   denotes place in time only  -- Other - I will add my own diagnosis  -- Disagree - Not applicable / Not valid  -- Disagree - Clinically unable to determine / Unknown  -- Refer to Clinical Documentation Reviewer    PROVIDER RESPONSE TEXT:    This patient has a likely ileus as a postoperative complication of    abdominal surgery.    Query created by: Amanda Morrison on 2024 2:41 PM      Electronically signed by:  Dawson Robertson MD 2024 3:56 PM          
0218  Patient complained of nausea.  Dilaudid given at 2339.  Patient has also had several cups of ice.      0700  Bedside and Verbal shift change report given to Shonda (oncoming nurse) by Charo (offgoing nurse). Report included the following information Nurse Handoff Report, Index, Intake/Output, MAR, and Recent Results.     
0623: Pt's HR  went low to 65 HR. Currently, her HR ranges bet 65-78. ANTONY Freeman made aware. Per ANTONY Freeman: \"don't titrate as patient is NPO\".  
0700 Bedside and Verbal shift change report given to Sofia RN (oncoming nurse) by Jaylyn HAYNES (offgoing nurse). Report included the following information Nurse Handoff Report, Adult Overview, Intake/Output, MAR, and Recent Results.      1325 Notified Do MD of patient complaints of indigestion in the upper stomach area. Per Do MD, will order Pepcid and an EKG.    1335 EKG completed and placed in Pt chart. No further orders at this time.    1900 Bedside and Verbal shift change report given to Zulema HAYNES (oncoming nurse) by Sofia RN (offgoing nurse). Report included the following information Nurse Handoff Report, Adult Overview, Intake/Output, MAR, and Recent Results.    
0700 Bedside and Verbal shift change report given to Sofia RN (oncoming nurse) by Zulema HAYNES (offgoing nurse). Report included the following information Nurse Handoff Report, Adult Overview, Intake/Output, MAR, and Recent Results.      1000 Per MD orders, NG tube disconnected from suction and clamped.     1200 Reached out to general surgery regarding Pt soiled abdominal dressing. Notified Thom KIMBROUGH of drainage and dressing change.    1238 Solumedrol and Reglan given. Not listed under active orders list, medications retrieved from stocked items list in med cabinet.    1400 Per MD orders, NG tube re-connected to suction. Will monitor output.    1630 Per Thom KIMBROUGH, remove NG tube at this time.    1900 Bedside and Verbal shift change report given to Wanda HAYNES (oncoming nurse) by Sofia RN (offgoing nurse). Report included the following information Nurse Handoff Report, Adult Overview, Intake/Output, MAR, and Recent Results.    
0700: Bedside and Verbal shift change report given to Karson RN (oncoming nurse) by Charo RN (offgoing nurse). Report included the following information Nurse Handoff Report, Index, Adult Overview, Intake/Output, MAR, and Recent Results.     0800: Pt c/o \"indigestion and nausea\". Scheduled pepcid and prn zofran given. This RN also noted that pt's NG tube suction was not pulling fluid into canister. Suction set at almost zero. Increased to low intermittent suction and immediately 550 mL was emptied into canister. MD notified. KUB ordered d/t distention and possible ileus as well.    1350: Called and left voicemail for pt's wife to give update. Gave call back number to call at her earliest convenience.    1900: Bedside and Verbal shift change report given to Charo RN (oncoming nurse) by Karson RN (offgoing nurse). Report included the following information Nurse Handoff Report, Index, Adult Overview, and Recent Results.     
0700: Bedside and Verbal shift change report given to Karson RN (oncoming nurse) by Kristy RN (offgoing nurse). Report included the following information Nurse Handoff Report, Index, Adult Overview, Recent Results, and Cardiac Rhythm Afib/Aflutter .     0725: Messaged hospitalist and left vm for gen surg MD alerting to pt bleeding extensively from incision site. No new orders at this time.    1900: Bedside and Verbal shift change report given to Suzie RN (oncoming nurse) by Karson RN (offgoing nurse). Report included the following information Nurse Handoff Report, Index, Adult Overview, Intake/Output, MAR, Recent Results, and Cardiac Rhythm Afib/Aflutter .     
0700: Bedside and Verbal shift change report given to Karson RN (oncoming nurse) by Wanda RN (offgoing nurse). Report included the following information Nurse Handoff Report, Index, Adult Overview, Recent Results, and Cardiac Rhythm Afib/Aflutter .     0945: Pt cleaned and linen/gown changed after bm. New malewick applied. Pt resting comfortably.    1345: Called pt's wife to give update. Opportunity for questions and clarification provided.     1635: New MRI screening form completed and signed by pt.    1900: Bedside and Verbal shift change report given to Kristy HAYNES (oncoming nurse) by Karson RN (offgoing nurse). Report included the following information Nurse Handoff Report, Index, Adult Overview, and Cardiac Rhythm Afib/Aflutter .     
0724 notified MD that potassium was 2.9, need order for +k    0926 notified MD that patient isn't NPO anymore and is not a diabetic patient. Received orders to stop BS q6hs, ACHS.  
0828 Paged MD\" Pt has been bradying into the 50's and high 40's. He's on a dilt drip. Can we turn off the drip or discontinue the drip now that patient is taking oral po meds? \"    MD responded with holding the drip until 3am Will continue to monitor pts VS and resume at 3.     
1014: made dr newell aware Is in Afib  was told he was SR at other hospital, EKG completed. No new orders     1150: made dr newell aware Potassium 2.7 and MD advised would order more iv potassium    1200: made dr newell aware phosphorus 8.6 and CRT is elevated. Asked if should be stepdown and MD made patient stepdown level of care.       1301  Patient is poor historian with how he takes and what meds he takes at home. Stated he did not take things exactly like they say on the bottle, so does not have to refill some of them as often. He could not identify any of the names of his meds except potassium. RN made Nico BARRERA RN aware that med rec could not be completed as she is now taking over his care. Also informed needs potassium lab draw at 1800.   
1435 Accessed chart to assist primary RN.   
1615 Spoke with Jorge Pharmacist about restarting Pt Heparin gtt. Orders received to start it at 14 units/kg/hr and give 4,000 unit initial bolus. Anti Xa sent. Notified Dr. Robertson.  
1855 Notified Dr. Dean that Pt is to be transported down to obtain MRI of brain. Ok to pause TPN/Lipids and Heart monitoring for transport and MRI imaging. Will notify Night shift RN. Notified MRI department.  
1900  Bedside shift change report given to Zulema (oncoming nurse) by IVY Black (offgoing nurse). Report included the following information SBAR, Kardex, ED Summary, Intake/Output, and Recent Results.        0700  Bedside shift change report given to IVY Black (oncoming nurse) by Zulema (offgoing nurse). Report included the following information SBAR, Kardex, ED Summary, Intake/Output, and Recent Results.         This patient was assisted with Intentional Toileting every 2 hours during this shift as appropriate.  Documentation of ambulation and output reflected on Flowsheet as appropriate.  Purposeful hourly rounding was completed using AIDET and 5Ps.  Outcomes of PHR documented as they occurred. Bed alarm in use as appropriate.  Dual Suction and ambubag in place.        
1900: Bedside shift change report given to IVY Wagner (oncoming nurse) by Karson Merchant RN (offgoing nurse). Report included the following information Nurse Handoff Report, Adult Overview, Surgery Report, Intake/Output, Recent Results, and Cardiac Rhythm A-Flutter .     0315: Called and left message for General Surgery regarding patients bleeding from abdominal incision.     0700: Bedside shift change report given to IVY Hernandez (oncoming nurse) by IVY Wagner (offgoing nurse). Report included the following information Nurse Handoff Report, Adult Overview, Surgery Report, Intake/Output, Recent Results, and Cardiac Rhythm A-Fib .     
1930 Bedside shift change report given to Gabby HAYNES (oncoming nurse) by Sherrell  (offgoing nurse). Report included the following information SBAR, Intake/Output, MAR, Recent Results, and Cardiac Rhythm Afib .      2250 Entered room to empty the NGT. Patient appears to be confused and disoriented to time, place, and situation. Patient was previously A&O x4. Attempted to reorient patient and patient states that I must be lying to him. Patient wanted to speak with his wife, called patients wife in the room.    2251 Notified charge nurse of patient condition change, charge assessed patient. Patient now complaining of pain in his left arm and states that his arm is \"stiff\".  is weaker in the left arm.     2300 Bedside shift change report given to Zulema HAYNES (oncoming nurse) by Gabby HAYNES (offgoing nurse). Report included the following information SBAR, Intake/Output, MAR, Recent Results, and Cardiac Rhythm Afib .     
1930 Bedside shift change report given to Gabby RN (oncoming nurse) by Nico RN (offgoing nurse). Report included the following information SBAR, Intake/Output, MAR, Recent Results, and Cardiac Rhythm afib .      0700 Bedside shift change report given to Viktoria RN (oncoming nurse) by Gabby RN (offgoing nurse). Report included the following information SBAR, Intake/Output, MAR, Recent Results, and Cardiac Rhythm Afib .     
1930 Bedside shift change report given to Gabby RN (oncoming nurse) by Nico RN (offgoing nurse). Report included the following information SBAR, Intake/Output, MAR, Recent Results, and Cardiac Rhythm afib .      1930 Patient was changed from NPO to clear liquid diet for dinner time. Upon assessment of the patient he has vomited and is belching. Patient is also expressing abdominals discomfort. Notified MD Michael, inquired about possible NGT and NPO status.     1943 Per MD Michael change diet order to NPO and place NGT tube.     2115 NGT is placed, will place order for KUB.     2155 Patient is complaining of nausea and indigestion. Notified MD. Michael order received for IV ativan.     2216 KUB results show that NG tube needs to be advanced 5 more CM per MD Micheal advance 5 CM and place NGT on low intermittent suction.     2230 NGT is advanced to 60 CM and placed on low intermittent suction.    0116 Patient removed NGT tube. Orders received to place NGT again.     0332 NGT placed, 63 cm, per x-ray placement is appropriate. NGT placed on low-intermittent suction.    0700 Total NGT output for shift is 2500 ml.     0700 Bedside shift change report given to Sherrell HAYNES (oncoming nurse) by Gabby RN (offgoing nurse). Report included the following information SBAR, Intake/Output, MAR, Recent Results, and Cardiac Rhythm Afib .     
2130  Took patient down to MRI.  MRI IV pump was malfunctioning.  Patient is on dilt and heparin.  Unable to complete MRI at this time.      0419  Heparin stopped.  Notified pharmacy.    0700  Bedside and Verbal shift change report given to Melissa (oncoming nurse) by Charo (offgoing nurse). Report included the following information Nurse Handoff Report, Index, Intake/Output, and MAR.     
2230: RRT RN rounded on patient due to bloody drainage from midline incision. Quickclot and new ABD dressing applied. Abdominal binder changed. Patient has no complaints of abdominal pain, no s/s of distress.     0310: Notified by RN that patients dressing and gown was saturated with bloody drainage from midline incision. RRT RN held pressure to area. General Surgery paged. Order to continue to change dressing as needed. Patients VSS, hgb 9.2, no complaints of abdominal pain, tolerating holding pressure to incision. New Quick Clot bandage applied with gauze, ABD pads, tape and abdominal binder. No other RRT interventions needed at this time.    
2330  Bedside shift change report given to Zulema (oncoming nurse) by Gabby HAYNES (offgoing nurse). Report included the following information SBAR, Kardex, ED Summary, Intake/Output, and Recent Results.    2345  Code stroke called for change in mentation and left sided weakness.         0700  Bedside shift change report given to IVY Torres (oncoming nurse) by Zulema (offgoing nurse). Report included the following information SBAR, Kardex, ED Summary, Intake/Output, and Recent Results.         This patient was assisted with Intentional Toileting every 2 hours during this shift as appropriate.  Documentation of ambulation and output reflected on Flowsheet as appropriate.  Purposeful hourly rounding was completed using AIDET and 5Ps.  Outcomes of PHR documented as they occurred. Bed alarm in use as appropriate.  Dual Suction and ambubag in place.      
Abdomen still distended, reports return of bowel function.    Vitals:    01/28/24 1959   BP: (!) 182/85   Pulse: 92   Resp: 21   Temp: 97.8 °F (36.6 °C)   SpO2: 96%     Abdomen distended, no peritonitis.     Lab Results   Component Value Date    WBC 7.4 01/28/2024    HGB 13.1 01/28/2024    HCT 40.3 01/28/2024    MCV 90.0 01/28/2024     01/28/2024     Lab Results   Component Value Date/Time     01/28/2024 04:20 PM    K 3.0 01/28/2024 04:20 PM    CL 99 01/28/2024 04:20 PM    CO2 34 01/28/2024 04:20 PM    BUN 57 01/28/2024 04:20 PM    CREATININE 5.31 01/28/2024 04:20 PM    GLUCOSE 72 01/28/2024 04:20 PM    CALCIUM 8.3 01/28/2024 04:20 PM    LABGLOM 11 01/28/2024 04:20 PM      HD2 SBO, resuming bowel function.  Clears  Ambulate    erythromycin    
Awaiting return of bowel function.   Hospitalist/nephrology managing medical issues.  Would recommend NGT if he vomits.    Tommy Tomas MD    [id number]    
Bedside and Verbal shift change report given to IVY Walsh (oncoming nurse) by IVY Mancilla (offgoing nurse). Report included the following information Nurse Handoff Report, Index, Adult Overview, Intake/Output, MAR, and Recent Results.     0712 - Bedside shift report done with Charo, heparin drip verified at this time.    1049 - Patient resting comfortably in bed, eyes closed.    1649 - Patient to start PPN, IVMF ordered as well. Spoke with Dr. Robertson, per MD love to continue IVMF, decrease rate to 75ml/hr.    Bedside and Verbal shift change report given to IVY Pierre (oncoming nurse) by IVY Walsh (offgoing nurse). Report included the following information Nurse Handoff Report, Index, Adult Overview, Intake/Output, MAR, and Recent Results.       
Bedside and Verbal shift change report given to IVY Walsh (oncoming nurse) by IVY Mancilla (offgoing nurse). Report included the following information Nurse Handoff Report, Index, Adult Overview, Intake/Output, MAR, and Recent Results.     1457 - Labs drawn after admission to unit and next scheduled lab due at 6pm. Spoke with Dr. Starks, per MD love to draw 2pm labs at 6pm.    1700 - Patient finishing up 5th run of potassium IV - lab recheck due at 1800 as well as 2 more runs of potassium. Labs to be sent and awaiting results prior to administering more potassium.     1834 - Order for aldosterone and renin lab. Lavender and red tubes needed. Per lab, lab to be drawn mid morning, must be awake for 2 hours, seated for 5-15 minutes, and walked to lab immediately. Patient also voided 1x unmeasureable while having BM, bladder scanner 146 ml. Dr. Starks aware.    Patient poor historian, unable to complete med rec at this time.     Bedside and Verbal shift change report given to IVY Sy (oncoming nurse) by IVY Walsh (offgoing nurse). Report included the following information Nurse Handoff Report, Index, Adult Overview, Intake/Output, MAR, Recent Results, and Med Rec Status.         
Bedside and Verbal shift change report given to IVY Walsh (oncoming nurse) by Jaylyn/IVY Boyd (offgoing nurse). Report included the following information Nurse Handoff Report, Index, Adult Overview, Intake/Output, MAR, and Recent Results.     1241 - Order  for IV potassium prior to RN administering last dose, MD to place new order.    1457 - Noon blood sugar check 67, patient treated with PRN and recheck 101. Dr. Starks notified, RN inquiring about changing MIVF due to patient being NPO.      - Dr. Starks notified potassium 3.0, MD to place orders to replete.     - Patient started on clear liquid diet for dinner, consuming majority of the tray. Patient now saying \"I feel like I need to burp\" as well as being nauseous and abdomen more distended. Dr. Starks notified, will continue to monitor.     - Patient hypertensive, most recent /92. Dr. Starks notified, RN inquiring about PRNs. MD to place orders.    Bedside and Verbal shift change report given to IVY Pierre (oncoming nurse) by IVY Walsh (offgoing nurse). Report included the following information Nurse Handoff Report, Index, Adult Overview, Intake/Output, MAR, and Recent Results.       
Bedside and Verbal shift change report given to Nico (oncoming nurse) by Charo (offgoing nurse). Report included the following information Nurse Handoff Report, Intake/Output, MAR, and Recent Results.     
Bedside report provide to Crystal night shift RN nurse. Patient care assumed. Acknowledge information.   
Brief Nutrition Follow Up    Type and Reason for Visit: Reassess    Nutrition Recommendations/Plan:   Continue Low Fiber diet  Continue ONS after discharge once daily   Stop Protein Gelatin ONS - pt not taking    Brief follow up. RD visited patient room, he had finished lunch earlier but RD didn't see tray. He is having bowel movements but doesn't feel they are complete and reports he is afraid to pass gas for fear stool will come out. Some abd bloating continues as expected. Blood glucose monitoring stopped since TPN has been weaned and stopped. Low K+ today is being corrected. Other electrolytes appear stable. Renal labs still elevated, nephrology following.     Diet Order:   ADULT ORAL NUTRITION SUPPLEMENT; Dinner; Low Calorie/High Protein Oral Supplement  ADULT ORAL NUTRITION SUPPLEMENT; Breakfast; Fortified Gelatin Oral Supplement  ADULT DIET; Regular; Low Fiber    Meal Intake:   Patient Vitals for the past 168 hrs:   PO Meals Eaten (%)   02/09/24 1307 1 - 25%   02/09/24 0919 76 - 100%   02/08/24 1221 76 - 100%   02/08/24 0826 76 - 100%   02/07/24 1212 76 - 100%   02/07/24 0945 76 - 100%   02/02/24 1826 0%   02/02/24 1500 0%     Supplement Intake:  No data found.    Estimated Nutrition Needs:   Energy Requirements Based On: Formula  Weight Used for Energy Requirements: Current  Energy (kcal/day): 2200 kcal/d (MSJ x1.2 x1.1)  Weight Used for Protein Requirements: Current  Protein (g/day): 72 - 100 g/d (0.8 - 1.1 g/kg)  Method Used for Fluid Requirements: 1 ml/kcal  Fluid (ml/day): 2200 mL/d    Last BM: 02/09/24  Edema: Right lower extremity, Left lower extremity      RUE Edema: None  LUE Edema: None  RLE Edema: Trace, Non-pitting  LLE Edema: Non-pitting, Trace    Nutr. Labs:  Lab Results   Component Value Date    CREATININE 2.03 (H) 02/09/2024    BUN 56 (H) 02/09/2024     02/09/2024    K 2.9 (L) 02/09/2024     02/09/2024    CO2 27 02/09/2024     Lab Results   Component Value Date/Time    POCGLU 99 
Code stroke Called at 2345, responded at 2345.    Signs and symptoms: Confusion and Left arm weakness   Time of Code Stroke Activation:2345  Provider at bedside time:  2350  VAN score: positive  Last Known Well (Time): 2300  Blood Glucose Result/Time: 142   Blood Pressure: 178/90  Anticoagulants (List medications): Heparin     Teleneuro notified Yes, responded at 2350. No CTA or perfusion per provider.    TNK Given No    Code Stroke Ended at 0025    RRT RN assisted with transport to accepting unit Yes.        Vitals:    01/29/24 2345   BP: (!) 178/90   Pulse: 100   Resp: 19   Temp: (!) 101.4 °F (38.6 °C)   SpO2: 90%        Matt Renee RN    
Comprehensive Nutrition Assessment    Type and Reason for Visit: Initial, NPO/Clear Liquid    Nutrition Recommendations/Plan:   PPN:   Day 1: 1008 mL, 101 g carbs, 42 g protein (D10, AA4.25% @ 42 mL/hour)  Day 2: 1512 mL, 151 g carbs, 64 g protein (D10, AA4.25% @ 63 mL/hour)  Day 3: 1992 mL, 199 g carbs, 85 g protein (D10, AA4.25% @ 83 mL/hour (goal)     2. Lipids: On Monday, check triglycerides and if < 400, add lipids per below:      Lipids 20% 250 mL @ 21 mL/hr x12 hours, 3x/week      3. Monitor K, Phos, Mg until stable x 3 days with nutrition, replace PRN.       Hold TPN rate at current if electrolytes are not stable    4. If Nutrition Support is needed > 3-4 days, place central line access to more closely meet needs.      PPN at goal rate 83 mL/hour with lipids 3x/week  provides 1228 kcal (56% needs); 84 g protein (92% needs)    Malnutrition Assessment:  Malnutrition Status:  Insufficient data (02/02/24 1641)    Context:  Acute Illness     Findings of the 6 clinical characteristics of malnutrition:  Energy Intake:  50% or less of estimated energy requirements for 5 or more days  Weight Loss:  Unable to assess     Body Fat Loss:  Unable to assess     Muscle Mass Loss:  Unable to assess    Fluid Accumulation:  No significant fluid accumulation     Strength:  Not Performed     Nutrition Assessment:    68 year old Male admitted with Small bowel obstruction (HCC) [K56.609] who has a past medical history of Hyperlipidemia and Hypertension. Pt expected to remain NPO for several more days following GI surgery, NG remains in place with fairly high output. Renal function improving today, potassium stable, Phos and Mg will be rechecked to obtain updated results. Unable to place central line access for nutrition support.MD has ordered mid-line for PPN to begin. Unable to meet calorie needs and 92% of minimal protein needs.          Estimated Daily Nutrient Needs:  Energy Requirements Based On: Formula  Weight Used for 
Comprehensive Nutrition Assessment    Type and Reason for Visit: Reassess    Nutrition Recommendations/Plan:   Begin TPN once central access in place:          Day 1: 1512 mL, 225 g carbs, 76 g protein (D15/AA5 @ 63 mL/hour)        Day 2: 1800 mL, 270 g carbs, 90 g protein (D15/AA5 @ 75 mL/hour)        If renal function stable, Day 3: 1992 mL, 300 g carbs, 100 g protein (D15/AA5 @ 83 mL/hour)     2. Begin Lipids (M,W,F): 250 mL of 20% Lipids @ 21 mL/hr x12 hours, 3x/week      3. Monitor K, Phos, Mg until stable x 3 days with nutrition, replace PRN.       Hold TPN rate at current if electrolytes or renal labs not stable       TPN @ 75 mL/hour with lipids 3x/week provides 1800 kcal/d (82% needs), 90 g protein (100% needs), 360 g dextrose   TPN at goal rate 83 mL/hour with lipids 3x/week provides 1969 kcal (90% needs); 100 g protein (100% needs), 400 g dextrose    Malnutrition Assessment:  Malnutrition Status:  Moderate malnutrition (02/05/24 1149)    Context:  Acute Illness     Findings of the 6 clinical characteristics of malnutrition:  Energy Intake:  50% or less of estimated energy requirements for 5 or more days  Weight Loss:  Unable to assess     Body Fat Loss:  Mild body fat loss Fat Overlying Ribs, Triceps   Muscle Mass Loss:  No significant muscle mass loss    Fluid Accumulation:  No significant fluid accumulation     Strength:  Not Performed     Nutrition Assessment:    68 year old Male admitted with Small bowel obstruction (HCC) [K56.609] who has a past medical history of A-fib (HCC), Hyperlipidemia, and Hypertension. Pt expected to remain NPO for several more days following GI surgery, NG remains in place with fairly high output. Renal function improving today, potassium stable, Phos and Mg will be rechecked to obtain updated results. Unable to place central line access for nutrition support.MD has ordered mid-line for PPN to begin. Unable to meet calorie needs and 92% of minimal protein needs.   
Comprehensive Nutrition Assessment    Type and Reason for Visit: Reassess    Nutrition Recommendations/Plan:   Wean TPN: order 1/2 volume for tonight & do not reorder tomorrow if diet is advanced and tolerating PO            2. Continue Lipids today and then stop orders if tolerating diet     3. Provide Gelatein once daily to increase kcal/protein intake (80 kcal, <1 g carbs, 20 g protein)   4. Provide Ensure High Protein once daily (160 kcal, 19 g carbs, 16 g protein) - cautious with only chocolate flavor is available in house           Malnutrition Assessment:  Malnutrition Status:  Moderate malnutrition (02/05/24 1149)    Context:  Acute Illness     Findings of the 6 clinical characteristics of malnutrition:  Energy Intake:  50% or less of estimated energy requirements for 5 or more days  Weight Loss:  Unable to assess     Body Fat Loss:  Mild body fat loss Fat Overlying Ribs, Triceps   Muscle Mass Loss:  No significant muscle mass loss    Fluid Accumulation:  No significant fluid accumulation     Strength:  Not Performed     Nutrition Assessment:    68 year old Male admitted with Small bowel obstruction (HCC) [K56.609] who has a past medical history of A-fib (HCC), Hyperlipidemia, and Hypertension. Pt expected to remain NPO for several more days following GI surgery, NG remains in place with fairly high output. Renal function improving today, potassium stable, Phos and Mg will be rechecked to obtain updated results. Unable to place central line access for nutrition support.MD has ordered mid-line for PPN to begin. Unable to meet calorie needs and 92% of minimal protein needs.       2/5: follow up. RD attended & discussed patient during interdisciplinary rounds on unit. RD visited patient room, still with NG having bilious output, no bowel function. Measured weight obtained to find it near UBW now. Nephology following, renal function is fair. Central line placement today to more closely meet calorie needs. 
Department of General Surgery - Adult  Surgical Service    Progress Note      SUBJECTIVE:  feels bloated, no bowel function    OBJECTIVE      Physical    VITALS:  BP (!) 158/93   Pulse 82   Temp 98.3 °F (36.8 °C)   Resp 18   Ht 1.803 m (5' 11\")   Wt 100.7 kg (222 lb)   SpO2 95%   BMI 30.96 kg/m²   ABDOMEN:  soft, distended, nontender    Data  CBC:   Lab Results   Component Value Date/Time    WBC 11.7 02/03/2024 04:19 AM    RBC 3.96 02/03/2024 04:19 AM    HGB 11.4 02/03/2024 04:19 AM    HCT 35.5 02/03/2024 04:19 AM    MCV 89.6 02/03/2024 04:19 AM    MCH 28.8 02/03/2024 04:19 AM    MCHC 32.1 02/03/2024 04:19 AM    RDW 15.0 02/03/2024 04:19 AM     02/03/2024 04:19 AM    MPV 9.4 02/03/2024 04:19 AM     BMP:    Lab Results   Component Value Date/Time     02/03/2024 04:19 AM    K 3.3 02/03/2024 04:19 AM     02/03/2024 04:19 AM    CO2 33 02/03/2024 04:19 AM    BUN 57 02/03/2024 04:19 AM    LABALBU 3.5 01/27/2024 01:00 AM    CREATININE 2.47 02/03/2024 04:19 AM    CALCIUM 8.9 02/03/2024 04:19 AM    GFRAA 58 03/17/2021 02:16 PM    LABGLOM 28 02/03/2024 04:19 AM    GLUCOSE 129 02/03/2024 04:19 AM       Current Inpatient Medications    Current Facility-Administered Medications: PN-Adult Premix  4.25/10 - Standard Electrolytes - Peripheral Line, , IntraVENous, Continuous TPN  potassium chloride 10 mEq/100 mL IVPB (Peripheral Line), 10 mEq, IntraVENous, Q2H  PN-Adult Premix  4.25/10 - Standard Electrolytes - Peripheral Line, , IntraVENous, Continuous TPN  heparin 25,000 units in dextrose 5% 250 mL (premix) infusion, 5-30 Units/kg/hr, IntraVENous, Continuous  [COMPLETED] piperacillin-tazobactam (ZOSYN) 4,500 mg in sodium chloride 0.9 % 100 mL IVPB (mini-bag), 4,500 mg, IntraVENous, Once **FOLLOWED BY** piperacillin-tazobactam (ZOSYN) 3,375 mg in sodium chloride 0.9 % 50 mL IVPB (mini-bag), 3,375 mg, IntraVENous, Q8H  HYDROmorphone (DILAUDID) injection 0.5 mg, 0.5 mg, IntraVENous, Q3H PRN **OR** 
Department of General Surgery - Adult  Surgical Service Progress Note      SUBJECTIVE:  BM today.  NGT output decreasing.     OBJECTIVE      Physical    VITALS:  BP (!) 172/85   Pulse 78   Temp 97.8 °F (36.6 °C) (Oral)   Resp 20   Ht 1.803 m (5' 11\")   Wt 90.4 kg (199 lb 3.2 oz)   SpO2 95%   BMI 27.78 kg/m²   ABDOMEN:  still distended, softer, nontender    Data  CBC:   Lab Results   Component Value Date/Time    WBC 13.7 02/05/2024 03:38 AM    RBC 3.68 02/05/2024 03:38 AM    HGB 10.7 02/05/2024 03:38 AM    HCT 33.9 02/05/2024 03:38 AM    MCV 92.1 02/05/2024 03:38 AM    MCH 29.1 02/05/2024 03:38 AM    MCHC 31.6 02/05/2024 03:38 AM    RDW 15.9 02/05/2024 03:38 AM     02/05/2024 03:38 AM    MPV 9.5 02/05/2024 03:38 AM     BMP:    Lab Results   Component Value Date/Time     02/05/2024 03:38 AM    K 3.3 02/05/2024 03:38 AM     02/05/2024 03:38 AM    CO2 28 02/05/2024 03:38 AM    BUN 44 02/05/2024 03:38 AM    LABALBU 3.5 01/27/2024 01:00 AM    CREATININE 1.98 02/05/2024 03:38 AM    CALCIUM 8.4 02/05/2024 03:38 AM    GFRAA 58 03/17/2021 02:16 PM    LABGLOM 36 02/05/2024 03:38 AM    GLUCOSE 163 02/05/2024 03:38 AM       Current Inpatient Medications    Current Facility-Administered Medications: prochlorperazine (COMPAZINE) injection 10 mg, 10 mg, IntraVENous, Q6H PRN  PN-Adult Premix 5/15 - Standard Electrolytes - Central Line, , IntraVENous, Continuous TPN  fat emulsion (INTRALIPID/NUTRILIPID) 20 % infusion 250 mL, 250 mL, IntraVENous, Q MWF  [START ON 2/6/2024] famotidine (PEPCID) 20 mg in sodium chloride (PF) 0.9 % 10 mL injection, 20 mg, IntraVENous, Daily  pantoprazole (PROTONIX) 40 mg in sodium chloride (PF) 0.9 % 10 mL injection, 40 mg, IntraVENous, QAM AC  bisacodyl (DULCOLAX) suppository 10 mg, 10 mg, Rectal, Daily  PN-Adult Premix  4.25/10 - Standard Electrolytes - Peripheral Line, , IntraVENous, Continuous TPN  enoxaparin (LOVENOX) injection 100 mg, 1 mg/kg, SubCUTAneous, BID  [COMPLETED] 
Department of General Surgery - Adult  Surgical Service Progress Note      SUBJECTIVE:  No bowel function.  Frustrated.  NGT still very bilious    OBJECTIVE      Physical    VITALS:  BP (!) 163/106   Pulse 80   Temp 98.2 °F (36.8 °C) (Oral)   Resp 20   Ht 1.803 m (5' 11\")   Wt 100.7 kg (222 lb)   SpO2 95%   BMI 30.96 kg/m²   ABDOMEN:  still distended, softer, nontender    Data  CBC:   Lab Results   Component Value Date/Time    WBC 11.7 02/03/2024 04:19 AM    RBC 3.96 02/03/2024 04:19 AM    HGB 11.4 02/03/2024 04:19 AM    HCT 35.5 02/03/2024 04:19 AM    MCV 89.6 02/03/2024 04:19 AM    MCH 28.8 02/03/2024 04:19 AM    MCHC 32.1 02/03/2024 04:19 AM    RDW 15.0 02/03/2024 04:19 AM     02/03/2024 04:19 AM    MPV 9.4 02/03/2024 04:19 AM     BMP:    Lab Results   Component Value Date/Time     02/04/2024 02:52 AM    K 3.5 02/04/2024 02:52 AM     02/04/2024 02:52 AM    CO2 31 02/04/2024 02:52 AM    BUN 46 02/04/2024 02:52 AM    LABALBU 3.5 01/27/2024 01:00 AM    CREATININE 2.17 02/04/2024 02:52 AM    CALCIUM 8.8 02/04/2024 02:52 AM    GFRAA 58 03/17/2021 02:16 PM    LABGLOM 32 02/04/2024 02:52 AM    GLUCOSE 225 02/04/2024 02:52 AM       Current Inpatient Medications    Current Facility-Administered Medications: PN-Adult Premix  4.25/10 - Standard Electrolytes - Peripheral Line, , IntraVENous, Continuous TPN  enoxaparin (LOVENOX) injection 100 mg, 1 mg/kg, SubCUTAneous, BID  PN-Adult Premix  4.25/10 - Standard Electrolytes - Peripheral Line, , IntraVENous, Continuous TPN  heparin 25,000 units in dextrose 5% 250 mL (premix) infusion, 5-30 Units/kg/hr, IntraVENous, Continuous  [COMPLETED] piperacillin-tazobactam (ZOSYN) 4,500 mg in sodium chloride 0.9 % 100 mL IVPB (mini-bag), 4,500 mg, IntraVENous, Once **FOLLOWED BY** piperacillin-tazobactam (ZOSYN) 3,375 mg in sodium chloride 0.9 % 50 mL IVPB (mini-bag), 3,375 mg, IntraVENous, Q8H  HYDROmorphone (DILAUDID) injection 0.5 mg, 0.5 mg, IntraVENous, Q3H 
Doing ok, just didn't sleep last night due to wound care.     Vitals:    02/08/24 0415   BP: (!) 143/110   Pulse: 86   Resp: 18   Temp: 97.5 °F (36.4 °C)   SpO2: 98%     Abdomen soft, nondistended. No perotinitis, MUCH less distended.      Lab Results   Component Value Date    WBC 17.4 (H) 02/08/2024    HGB 9.2 (L) 02/08/2024    HCT 27.9 (L) 02/08/2024    MCV 90.0 02/08/2024     02/08/2024       POD 8 Ex lap,pippa  Applied silver nitrate to skin edge stopped bleeding.  Abdomen does not appear to be source of leukocytosis   Low residue diet today.    Tommy Tomas MD    
[facility]AD MARIE [facility]  [address] [place] [address] [phone]      Medical Progress Note      NAME: Tim Morales Sr.   :  1955  MRM:  [id number]    Date/Time of service: 2024        Subjective:     Chief Complaint:  Patient was personally seen and examined by me during this time period.  Chart reviewed.  Fu small bowel obstruction. Concern for left UE weakness yesterday. Abd pain well controlled          Objective:       Vitals:       Last 24hrs VS reviewed since prior progress note. Most recent are:    Vitals:    24 0741   BP: (!) 155/96   Pulse: 93   Resp: 19   Temp: 97.6 °F (36.4 °C)   SpO2: 96%     SpO2 Readings from Last 6 Encounters:   24 96%   24 97%   23 97%          Intake/Output Summary (Last 24 hours) at 2024 0853  Last data filed at 2024 0751  Gross per 24 hour   Intake 356.8 ml   Output 2625 ml   Net -2268.2 ml          Exam:     Physical Exam:    Gen:   in no acute distress  HEENT:  Pink conjunctivae, PERRL, hearing intact to voice, NGT   Resp:  No accessory muscle use, clear breath sounds without wheezes rales or rhonchi  Card:  RRR, No murmurs, normal S1, S2, no peripheral edema  Abd:  distended  Lymph:  No cervical adenopathy  Musc:  No cyanosis or clubbing  Skin:  No rashes or ulcers, skin turgor is good  Neuro:  Cranial nerves 3-12 are grossly intact, follows commands appropriately, left sided weakness   Psych:  poor insight    Medications Reviewed: (see below)    Lab Data Reviewed: (see below)    ______________________________________________________________________    Medications:     Current Facility-Administered Medications   Medication Dose Route Frequency    potassium chloride 10 mEq/100 mL IVPB (Peripheral Line)  10 mEq IntraVENous Q1H    famotidine (PEPCID) 20 mg in sodium chloride (PF) 0.9 % 10 mL injection  20 mg IntraVENous Daily    heparin (porcine) injection 4,000 Units  4,000 Units IntraVENous PRN 
[facility] MARIE [facility]  [address] [place] [address] [phone]      Medical Progress Note      NAME: Tim Morales Sr.   :  1955  MRM:  [id number]    Date/Time of service: 2024        Subjective:     Chief Complaint:  Patient was personally seen and examined by me during this time period.  Chart reviewed.  Fu small bowel obstruction. Febrile this am.  Some abd pain         Objective:       Vitals:       Last 24hrs VS reviewed since prior progress note. Most recent are:    Vitals:    24 0806   BP: (!) 159/83   Pulse: (!) 118   Resp: 19   Temp: (!) 100.9 °F (38.3 °C)   SpO2: 93%     SpO2 Readings from Last 6 Encounters:   24 93%   24 97%   23 97%          Intake/Output Summary (Last 24 hours) at 2024 0852  Last data filed at 2024 0552  Gross per 24 hour   Intake 300 ml   Output 550 ml   Net -250 ml          Exam:     Physical Exam:    Gen:   in no acute distress  HEENT:  Pink conjunctivae, PERRL, hearing intact to voice, NGT   Resp:  No accessory muscle use, clear breath sounds without wheezes rales or rhonchi  Card:  No murmurs, normal S1, S2, no peripheral edema  Abd:  distended  Lymph:  No cervical adenopathy  Musc:  No cyanosis or clubbing  Skin:  No rashes or ulcers, skin turgor is good  Neuro:  Cranial nerves 3-12 are grossly intact, follows commands appropriately, left sided weakness   Psych:  poor insight    Medications Reviewed: (see below)    Lab Data Reviewed: (see below)    ______________________________________________________________________    Medications:     Current Facility-Administered Medications   Medication Dose Route Frequency    lactated ringers IV soln infusion   IntraVENous Continuous    piperacillin-tazobactam (ZOSYN) 4,500 mg in sodium chloride 0.9 % 100 mL IVPB (mini-bag)  4,500 mg IntraVENous Once    Followed by    piperacillin-tazobactam (ZOSYN) 3,375 mg in sodium chloride 0.9 % 50 mL IVPB (mini-bag)  
[facility] MARIE [facility]  [address] [place] [address] [phone]        Hospitalist Progress Note      NAME: Tim Morales Sr.   :  1955  MRM:  [id number]    Date/Time of service: 2024  9:46 AM       Subjective:     Chief Complaint:  Patient was personally seen and examined by me during this time period.  Chart reviewed.  Still has NGT.  Distended abdomen.  Spoke to wife on phone.  She is ok with central line placement        Objective:       Vitals:       Last 24hrs VS reviewed since prior progress note. Most recent are:    Vitals:    24 0747   BP: (!) 168/84   Pulse: 79   Resp: 19   Temp: 97.8 °F (36.6 °C)   SpO2: 96%     SpO2 Readings from Last 6 Encounters:   24 96%   24 97%   23 97%          Intake/Output Summary (Last 24 hours) at 2024 0946  Last data filed at 2024 0631  Gross per 24 hour   Intake --   Output 1500 ml   Net -1500 ml        Exam:     Physical Exam:    Gen:  frail, ill-appearing, NAD  HEENT:  Pink conjunctivae, PERRL, hearing intact to voice, has NGT  Neck:  Supple, without masses, thyroid non-tender  Resp:  No accessory muscle use, decreased BS at bases  Card:  No murmurs, normal S1, S2 without thrills, +edema  Abd:  Soft, mild pain, +distention, poor BS  Musc:  No cyanosis or clubbing  Skin:  No rashes  Neuro:  Cranial nerves 3-12 are grossly intact, mild LUE weakness, follows commands appropriately  Psych:  poor insight, oriented to person    Medications Reviewed: (see below)    Lab Data Reviewed: (see below)    ______________________________________________________________________    Medications:     Current Facility-Administered Medications   Medication Dose Route Frequency    potassium chloride 10 mEq/100 mL IVPB (Peripheral Line)  10 mEq IntraVENous Q2H    PN-Adult Premix  4.25/10 - Standard Electrolytes - Peripheral Line   IntraVENous Continuous TPN    enoxaparin (LOVENOX) injection 100 mg  1 mg/kg 
[facility]AD MARIE [facility]  [address] [place] [address] [phone]        Hospitalist Progress Note      NAME: Tim Morales Sr.   :  1955  MRM:  [id number]    Date/Time of service: 2024  12:07 PM       Subjective:     Chief Complaint:  Patient was personally seen and examined by me during this time period.  Chart reviewed.  Still with NGT output. Issues with high blood pressure       Objective:       Vitals:       Last 24hrs VS reviewed since prior progress note. Most recent are:    Vitals:    24 1115   BP: (!) 162/83   Pulse: 63   Resp: 17   Temp: 97.4 °F (36.3 °C)   SpO2: 99%     SpO2 Readings from Last 6 Encounters:   24 99%   24 97%   23 97%          Intake/Output Summary (Last 24 hours) at 2024 1207  Last data filed at 2024 0847  Gross per 24 hour   Intake 100 ml   Output 3125 ml   Net -3025 ml          Exam:     Physical Exam:    Gen:  frail, ill-appearing, NAD  HEENT:  Pink conjunctivae, PERRL, hearing intact to voice, has NGT  Neck:  Supple, without masses, thyroid non-tender  Resp:  No accessory muscle use, decreased BS at bases  Card:  No murmurs, normal S1, S2 without thrills, +edema  Abd:  Soft, mild pain, +distention, poor BS  Musc:  No cyanosis or clubbing  Skin:  No rashes  Neuro:  Cranial nerves 3-12 are grossly intact, mild LUE weakness, follows commands appropriately  Psych:  poor insight, oriented to person    Medications Reviewed: (see below)    Lab Data Reviewed: (see below)    ______________________________________________________________________    Medications:     Current Facility-Administered Medications   Medication Dose Route Frequency    labetalol (NORMODYNE;TRANDATE) injection 20 mg  20 mg IntraVENous Q4H PRN    insulin lispro (HUMALOG) injection vial 0-8 Units  0-8 Units SubCUTAneous Q6H    PN-Adult Premix 5/15 - Standard Electrolytes - Central Line   IntraVENous Continuous TPN    prochlorperazine 
FUAD MARIE [facility]  [address] [place] [address] [phone]      Medical Progress Note      NAME: Tim Morales Sr.   :  1955  MRM:  [id number]    Date/Time of service: 2024        Subjective:     Chief Complaint:  Patient was personally seen and examined by me during this time period.  Chart reviewed.  Fu small bowel obstruction. No current n/v. Endorses bowel movement yesterday         Objective:       Vitals:       Last 24hrs VS reviewed since prior progress note. Most recent are:    Vitals:    24 0756   BP: 139/80   Pulse: 96   Resp: 16   Temp: 98.2 °F (36.8 °C)   SpO2: 92%     SpO2 Readings from Last 6 Encounters:   24 92%   24 97%   23 97%          Intake/Output Summary (Last 24 hours) at 2024 0911  Last data filed at 2024 0635  Gross per 24 hour   Intake 0 ml   Output 270 ml   Net -270 ml        Exam:     Physical Exam:    Gen:   in no acute distress  HEENT:  Pink conjunctivae, PERRL, hearing intact to voice  Resp:  No accessory muscle use, clear breath sounds without wheezes rales or rhonchi  Card:  RRR, No murmurs, normal S1, S2, no peripheral edema  Abd:  distended  Lymph:  No cervical adenopathy  Musc:  No cyanosis or clubbing  Skin:  No rashes or ulcers, skin turgor is good  Neuro:  Cranial nerves 3-12 are grossly intact, follows commands appropriately  Psych:  poor insight    Medications Reviewed: (see below)    Lab Data Reviewed: (see below)    ______________________________________________________________________    Medications:     Current Facility-Administered Medications   Medication Dose Route Frequency    potassium chloride 10 mEq/100 mL IVPB (Peripheral Line)  10 mEq IntraVENous Q1H    heparin (porcine) injection 4,000 Units  4,000 Units IntraVENous Once    heparin (porcine) injection 4,000 Units  4,000 Units IntraVENous PRN    heparin (porcine) injection 2,000 Units  2,000 Units IntraVENous PRN    heparin 
[facility] MAR[facility]  [address] [place] [address] [phone]        Hospitalist Progress Note      NAME: Tim Morales Sr.   :  1955  MRM:  [id number]    Date/Time of service: 2024  11:25 AM       Subjective:     Chief Complaint:  Patient was personally seen and examined by me during this time period.  Chart reviewed.  NGT out yesterday.  Having BM.  Tolerating some clears       Objective:       Vitals:       Last 24hrs VS reviewed since prior progress note. Most recent are:    Vitals:    24 1030   BP: (!) 158/85   Pulse: 83   Resp: 22   Temp: 97.9 °F (36.6 °C)   SpO2: 99%     SpO2 Readings from Last 6 Encounters:   24 99%   24 97%   23 97%          Intake/Output Summary (Last 24 hours) at 2024 1125  Last data filed at 2024 0945  Gross per 24 hour   Intake 2550 ml   Output 450 ml   Net 2100 ml          Exam:     Physical Exam:    Gen:  frail, ill-appearing, NAD  HEENT:  Pink conjunctivae, PERRL, hearing intact to voice  Neck:  Supple, without masses, thyroid non-tender  Resp:  No accessory muscle use, decreased BS at bases  Card:  No murmurs, normal S1, S2 without thrills, +edema  Abd:  Soft, mild pain, +distention, +BS  Musc:  No cyanosis or clubbing  Skin:  No rashes  Neuro:  Cranial nerves 3-12 are grossly intact, mild LUE weakness, follows commands appropriately  Psych:  poor insight, oriented to person    Medications Reviewed: (see below)    Lab Data Reviewed: (see below)    ______________________________________________________________________    Medications:     Current Facility-Administered Medications   Medication Dose Route Frequency    lactobacillus (CULTURELLE) capsule 1 capsule  1 capsule Oral Daily with breakfast    amLODIPine (NORVASC) tablet 5 mg  5 mg Oral Daily    PN-Adult Premix /15 - Standard Electrolytes - Central Line   IntraVENous Continuous TPN    labetalol (NORMODYNE;TRANDATE) injection 20 mg  20 mg 
[facility] MARIE [facility]  [address] [place] [address] [phone]      Medical Progress Note      NAME: Tim Morales Sr.   :  1955  MRM:  [id number]    Date/Time: 2024  11:30 AM           Assessment / Plan:   67 yo hx of HTN, Pafib, PMR, CKD 3, presented w/ abd pain, SBO, afib RVR.     Small bowel obstruction requiring exploratory laparotomy and lysis of adhesions   Postoperative persistent ileus status post NG tube removal   TPN weaned off   Complex 68 years old female who initially presented on  with small bowel obstruction that required exploratory laparotomy and lysis of adhesion on  with postoperative complications of persistent ileus requiring NG tube which was removed on .  Required TPN which was weaned off .  Currently tolerating regular diet.  Leukocytosis noted, suspect reactive +/- steroid-induced.  Status post Zosyn, remains afebrile and nontoxic-appearing.    Plans to aggressively mobilize, PT and OT with discharge planning; case management following with [employer] authorization started.  General surgery appreciated.     Persistent atrial fibrillation  Likely triggered by above acute illness.  Initially required diltiazem drip which has been weaned off.  Echocardiogram done with noted EF of 65 to 70%.  Rate control on oral labetalol.    Resuming Eliquis today.  Closely monitor H&H.    Anemia  This appears to be related to current admission as patient presented on  with hemoglobin of 15.6.  I suspect current anemia is reflective of acute blood loss during surgery, dilutional, and likely nutritional as well.  No overt signs of bleeding noted at this time.  H&H stable today.  Resuming Eliquis as above.    Severe protein caloric malnutrition  Status post TPN.    Encourage oral food and fluid intake.    Hypokalemia  Further repletion today.     Acute kidney injury on CKD 3  Metabolic acidosis  Renal functions continue to 
Heparin restarting  - Discussed with RN will give 4,000 unit initial bolus and restart @ 14 units/kg/hr. Draw first therapeutic Xa @ 2200.     Thanks,   Laura RuanoD    
Instructed the pt what I was about to do: D/C IV Heparin per order and as I was flushing it he asked  \"Are you killing me?.\" I requested the pt to repeat what he said but he mumbled away. I re-educated the pt on what I was doing and reminded him that I was his nurse for today executing only the doctors order or has prescribed. He said \"Y'all told me I was gonna get better in 3 weeks.\" I explained to him that I did not say those things to him and it might have been his group of doctors that explained him the ESTIMATE of hospital stay. I also asked him if he had any questions I can answer so I can help him better if he had any specific concerns. Pt was irritable and would not state any concerns and kept on his personal phone. I mentioned to him that I will request Dr. Robertson to come at bedside to talk to him about his progress if that would give him clarification. I stayed in the room to contact Dr. Robertson via secure message. Pt stated \"is that all you can do is press buttons?\"    I reminded the pt that as his nurse I am the one advocating for his needs and providing for his care throughout the day. Respect is much appreciated unless he wants another nurse he can surely request for it. Pt stayed quiet. I reassured if there was anything else if he needed before I left the room. Pt remained silent.    I stepped out of the room and Nico HAYNES (charge nurse) told me that pt is on the phone with 911. I went in the room and pt handed me his personal phone while he was asking 911 for help. I received the phone to inform the  that this pt is at currently at the hospital and I am his nurse.  (laughed) and ended the call appropriately. Reported and notified Dr. Robertson what has happened and doc mentioned he cannot come to bedside right now. Dr. Robertson also mentioned that he had talked to his wife earlier today already as well.     Attempted to call Zabrina (wife) to give her an update but it went straight 
MD Walker at bedside report H&H of 7.8/23.6. Acknowledge information. MD Walker states will continue to monitor. No further orders received. MD Walker states will consider if anticoagulant needs to be restarted. No further orders received.   
NGT out, no nausea.  BM ongoing.    Vitals:    02/06/24 1900   BP: (!) 153/84   Pulse: 67   Resp: 16   Temp: 98.1 °F (36.7 °C)   SpO2: 98%     Abdomen soft, mildly distended.     POD 6 Ex lap, MINERVA  Clears, advance as tolerated.  PT  Wean TPN when eating.    Tommy Tomas MD    
NGT placed overnight, feels better.  No flatus/bm today.    Vitals:    01/29/24 1100   BP: (!) 144/78   Pulse: 83   Resp: 23   Temp:    SpO2: 97%     Abomen still distended, less tense.    Lab Results   Component Value Date    WBC 6.5 01/29/2024    HGB 13.2 01/29/2024    HCT 40.0 01/29/2024    MCV 88.7 01/29/2024     01/29/2024     Lab Results   Component Value Date/Time     01/29/2024 12:23 AM    K 3.3 01/29/2024 12:23 AM    CL 97 01/29/2024 12:23 AM    CO2 31 01/29/2024 12:23 AM    BUN 58 01/29/2024 12:23 AM    CREATININE 5.08 01/29/2024 12:23 AM    GLUCOSE 120 01/29/2024 12:23 AM    CALCIUM 8.3 01/29/2024 12:23 AM    LABGLOM 12 01/29/2024 12:23 AM      HD3 SBO  Repeat CT tomorrow, oral contrast (does not need IV contrast) via NGT tomorrow;  if not improved likely OR earliest would be Wednesday.  Will have to coordinate with Dr. Starks and cardiology, given anticoagulation.     Tommy Tomas MD    
Near ng dislodgment. Patient getting out of bed to use bedside commode, ng out to 20cm. Advanced to 63 cm by Sherrell Schulz RN. Clamped. Reported to provider. Received order to have chest xray stat for confirm ng placement. Pt in no distress.  IVY Burger  
No bowel function. CT reviewed.     Vitals:    01/30/24 1608   BP: (!) 157/93   Pulse: 85   Resp: 21   Temp: 97.8 °F (36.6 °C)   SpO2: 93%     Abomen still distended, less tense.    Lab Results   Component Value Date    WBC 10.4 01/30/2024    HGB 12.9 01/30/2024    HCT 39.9 01/30/2024    MCV 89.1 01/30/2024     01/30/2024     Lab Results   Component Value Date/Time     01/30/2024 02:50 AM    K 3.4 01/30/2024 12:40 PM    CL 96 01/30/2024 02:50 AM    CO2 34 01/30/2024 02:50 AM    BUN 54 01/30/2024 02:50 AM    CREATININE 3.53 01/30/2024 02:50 AM    GLUCOSE 150 01/30/2024 02:50 AM    CALCIUM 8.4 01/30/2024 02:50 AM    LABGLOM 18 01/30/2024 02:50 AM     CT Result (most recent):  CT ABDOMEN PELVIS WO IV CONTRAST 01/30/2024    Narrative  EXAM: CT ABDOMEN PELVIS WO CONTRAST    INDICATION: Follow-up small bowel obstruction    COMPARISON: 1/27/2024    IV CONTRAST: None.    ORAL CONTRAST: None    TECHNIQUE:  Thin axial images were obtained through the abdomen and pelvis. Coronal and  sagittal reformats were generated. CT dose reduction was achieved through use of  a standardized protocol tailored for this examination and automatic exposure  control for dose modulation.    The absence of intravenous contrast material reduces the sensitivity for  evaluation of the vasculature and solid organs.    FINDINGS:  LOWER THORAX: Bibasilar atelectasis  LIVER: Small cyst within the liver  BILIARY TREE: Gallbladder is within normal limits. CBD is not dilated.  SPLEEN: within normal limits.  PANCREAS: No focal abnormality.  ADRENALS: Unremarkable.  KIDNEYS/URETERS: Punctate nonobstructing left renal stone. No hydronephrosis.  Subtle hypodensity left kidney on previous study is difficult to visualize  STOMACH: NG tube positioned appropriately within the stomach  SMALL BOWEL: Small bowel distention is stable to minimally improved. There are  nondistended distal loops.  COLON: Few scattered diverticula  APPENDIX: Not 
No flatus or bowel function yet.  Exercising in chair on my visit.  Pain reasonable.     Vitals:    02/02/24 1600   BP: (!) 156/91   Pulse: 94   Resp:    Temp:    SpO2:      Abdomen soft, incision clean.  Distended.    Lab Results   Component Value Date    WBC 11.4 (H) 02/02/2024    HGB 12.1 02/02/2024    HCT 36.7 02/02/2024    MCV 89.1 02/02/2024     02/02/2024     Lab Results   Component Value Date/Time     02/02/2024 12:17 AM    K 3.6 02/02/2024 12:17 AM     02/02/2024 12:17 AM    CO2 33 02/02/2024 12:17 AM    BUN 73 02/02/2024 12:17 AM    CREATININE 3.53 02/02/2024 12:17 AM    GLUCOSE 115 02/02/2024 12:17 AM    CALCIUM 8.9 02/02/2024 12:17 AM    LABGLOM 18 02/02/2024 12:17 AM          POD2 Laparotomy, lysis of adhesions appendectomy  Continue NGT, ok for sips/chips.  Discussed patient with Dr. Robertson.  I suspect that his bowel function will take a while to resume, given the dissection required to free his bowels. Would not be opposed to TPN.   Ambulate.  Following.    Tommy Tomas MD    
Noticed the NG tube was pulled out some. Re insterted NG tube, pt tolerated well. Re-taped and secured with tape. Notified Dr. Robertson and asked if he wants a KUB to confirm NG tube placement. Dr. Robertson said wait and see if theres drainage, if there is then no KUB is needed. Drainage noted afterwards.  
Notified pt spouse that he is in 334  
Off floor for surgery.  Will check back tomorrow.  
Patient Fall protocol  Yellow arm band applied to patient.  Yellow non-skid socks placed on Patient.    Bed in low position, all side rails up, call bell in reach.  Patient and family instructed on \"Call Don't Fall\" Protocol   -Use your call bell, wait for assistance, staff (not Family) will assist you to get up and move about.  Patient and Family verbalize understanding of Fall Precautions and the \" Call Don't Fall\" Protocol    
Pharmacy Dosing Services: 02/08/24    The pharmacist has determined that this patient meets P & T approved criteria for conversion from IV to oral therapy for the following medication:  Famotidine 20 mg IV daily  Metoclopramide 5 mg IV every 8 hours  Pantoprazole 40 mg IV daily    The pharmacist has written the following order for the patient:   Famotidine 20 mg tablet daily  Metoclopramide 5 mg tablet every 8 hours  Pantoprazole 40 mg tablet daily    The pharmacist will continue to monitor the patient's status and advise the physician if conversion back to IV therapy is recommended.    Signed Josias Ruiz RPH Contact information: [phone]  
Pharmacy Dosing Services: Zosyn    Zosyn 3.375 gm IV Q6H automatically adjusted per protocol to Zosyn 4.5 gm IV x 1 over 30 min followed by Zosyn 3.375 gm IV Q8H (extended-infusion over 4 hours) for CrCl >/= 20 mL/min  Maintenance dose scheduled to begin 6 hours after load for CrCl >/= 20 mL/min and 8 hours after load for CrCl < 20 mL/min    Thank you,  Walker Todd, Pharm D, BCPS  [phone]    
Phone call to MRI to inquire if patient may come to MRI today. Spoke with Isidro in MRI who report \"in order for patient to have an MRI he will need to be on only one drip\". Reported to Dr. Robertson patient is currently on continuous heparin, diltiazem and PPN. Received order from Dr. Robertson, \"hold MRI for now\".  IVY Burger  
Physical Therapy  Patient having a procedure/line placed at bedside.  We will continue to follow and re-attempt later as able.  Thank you.  Edith Gill PT,DPT,NCS,CLT    
Report via secure message to MD Walker patient K level of 3.3.   
SURGERY PROGRESS NOTE      Admit Date: 2024    POD 9 Days Post-Op    Procedure: Procedure(s):  EXPLORATORY LAPAROTOMY, EXTENSIVE LYSIS OF ADHESIONS, APPENDECTOMY      Subjective:   Feels well  Tolerating diet  +BM      Objective:     /76   Pulse 88   Temp 98 °F (36.7 °C) (Oral)   Resp 20   Ht 1.803 m (5' 11\")   Wt 97.6 kg (215 lb 3.2 oz)   SpO2 99%   BMI 30.01 kg/m²      Temp (24hrs), Av.8 °F (36.6 °C), Min:97.2 °F (36.2 °C), Max:98.4 °F (36.9 °C)      Physical Exam:     Abdomen:  Soft.  Non-tender, non-distended.  Incision C/D/I.        Assessment:     Principal Problem:    Small bowel obstruction (HCC)  Active Problems:    Primary hypertension    Pure hypercholesterolemia    Polymyalgia rheumatica (HCC)    Persistent atrial fibrillation (HCC)    CHETNA (acute kidney injury) (HCC)    CKD (chronic kidney disease) stage 3, GFR 30-59 ml/min (HCC)  Resolved Problems:    * No resolved hospital problems. *      Plan/Recommendations/Medical Decision Making:   Diet as tolerates  Mobilize  Please call with further questions or concerns  Follow up with Dr. Tomas in 2-3 weeks    
Spiritual Care Assessment/Progress Note  [facility]    Name: Tim Morales Sr. MRN: [id number]    Age: 68 y.o.     Sex: male   Language: English     Date: 2/1/2024            Total Time Calculated: 15 min              Spiritual Assessment begun in [facility] B3 INTERMEDIATE CARE UNIT  Service Provided For:: Patient     Encounter Overview/Reason : Attempted Encounter    Spiritual beliefs:      [] Involved in a rona tradition/spiritual practice:      [] Supported by a rona community:      [] Claims no spiritual orientation:      [] Seeking spiritual identity:           [] Adheres to an individual form of spirituality:      [x] Not able to assess:                Identified resources for coping and support system:   Support System: Unknown       [] Prayer                  [] Devotional reading               [] Music                  [] Guided Imagery     [] Pet visits                                        [] Other: (COMMENT)     Specific area/focus of visit   Encounter:    Crisis:    Spiritual/Emotional needs:    Ritual, Rites and Sacraments:    Grief, Loss, and Adjustments:    Ethics/Mediation:    Behavioral Health:    Palliative Care:    Advance Care Planning:      Plan/Referrals: Other (Comment) (Please contact [facility] for further consults.)    Narrative:  visit for the patient on IMCU. Reviewed pt's chart. Pt was being attended to by medical staff. No family present at this time. Chart indicates no Confucianism preference. Offered words of good intent on behalf of the pt. Please contact [facility] for further referrals.    Chaplain Gutierrez, MS, MDiv, [facility]  Spiritual Health Services  Paging service: [phone] (CROW)        
Still has ongoing nausea, NGT will empty with increased suction pressure.  No flatus yet.    Vitals:    02/01/24 2004   BP: (!) 177/91   Pulse: (!) 106   Resp: 20   Temp: 98.1 °F (36.7 °C)   SpO2: 92%     Abdomen soft, incision clean.  Distended.    POD1 Laparotomy, lysis of adhesions appendectomy  Continue NGT, ok for sips/chips.  Ambulate.  Awaiting return of bowel function.    Tommy Tomas MD    
Stroke Education provided to patient and the following topics were discussed    1. Patients personal risk factors for stroke are hyperlipidemia and hypertension    2. Warning signs of Stroke:        * Sudden numbness or weakness of the face, arm or leg, especially on one side of          The body            * Sudden confusion, trouble speaking or understanding        * Sudden trouble seeing in one or both eyes        * Sudden trouble walking, dizziness, loss of balance or coordination        * Sudden severe headache with no known cause      3. Importance of activation Emergency Medical Services ( 9-1-1 ) immediately if experience any warning signs of stroke.    4. Be sure and schedule a follow-up appointment with your primary care doctor or any specialists as instructed.     5. You must take medicine every day to treat your risk factors for stroke.  Be sure to take your medicines exactly as your doctor tells you: no more, no less.  Know what your medicines are for , what they do.  Anti-thrombotics /anticoagulants can help prevent strokes.  You are taking the following medicine(s)  Heparin Drip    6.  Smoking and second-hand smoke greatly increase your risk of stroke, cardiovascular disease and death. Smoking history never    7. Information provided was [facility] Stroke Education Binder, Stroke Handouts, or Verbal Education    8. Documentation of teaching completed in Patient Education Activity and on Care Plan with teaching response noted?  Yes, education provided. Care plan no longer available.   
Toleraeting liquids with good BM. No pain.     Vitals:    02/07/24 2026   BP: (!) 162/106   Pulse: 98   Resp: 18   Temp: 98.2 °F (36.8 °C)   SpO2: 98%     Abdomen soft, mildly distended.     POD 7 Ex lap, MINERVA  No new issues. Looks good. ADAT.    Tommy Tomas MD    
Ultrasound IV by Radha Montes De Oca. RN :  Procedure Note    Ultrasound IV education provided to patient. Opportunities for questions given.     Ultrasound used for PIV placement:  20 gauge 8 cm PowerGlide Pro 8cm  left lower forearm location.  1 X Attempt(s).    Flushed with ease; vigorous blood return.     Procedure tolerated well. Primary RN aware of IV placement and added to LDA.      Sofia Montes De Oca, RN   
Units  2,000 Units IntraVENous PRN    heparin 25,000 units in dextrose 5% 250 mL (premix) infusion  5-30 Units/kg/hr IntraVENous Continuous    dilTIAZem 100 mg in sodium chloride 0.9 % 100 mL infusion (ADD-[place])  5 mg/hr IntraVENous Continuous    dextrose 5 % solution   IntraVENous Continuous    [Held by provider] predniSONE (DELTASONE) tablet 10 mg  10 mg Oral Daily    labetalol (NORMODYNE;TRANDATE) injection 10 mg  10 mg IntraVENous Q8H PRN    LORazepam (ATIVAN) injection 0.5 mg  0.5 mg IntraVENous Q4H PRN    sodium chloride flush 0.9 % injection 5-40 mL  5-40 mL IntraVENous 2 times per day    sodium chloride flush 0.9 % injection 5-40 mL  5-40 mL IntraVENous PRN    0.9 % sodium chloride infusion   IntraVENous PRN    ondansetron (ZOFRAN-ODT) disintegrating tablet 4 mg  4 mg Oral Q8H PRN    Or    ondansetron (ZOFRAN) injection 4 mg  4 mg IntraVENous Q6H PRN    polyethylene glycol (GLYCOLAX) packet 17 g  17 g Oral Daily PRN    acetaminophen (TYLENOL) tablet 650 mg  650 mg Oral Q6H PRN    Or    acetaminophen (TYLENOL) suppository 650 mg  650 mg Rectal Q6H PRN    morphine (PF) injection 2 mg  2 mg IntraVENous Q6H PRN    lactated ringers IV soln infusion   IntraVENous Continuous    glucose chewable tablet 16 g  4 tablet Oral PRN    dextrose bolus 10% 125 mL  125 mL IntraVENous PRN    Or    dextrose bolus 10% 250 mL  250 mL IntraVENous PRN    glucagon injection 1 mg  1 mg SubCUTAneous PRN    dextrose 10 % infusion   IntraVENous Continuous PRN          Lab Review:     Recent Labs     01/28/24  0555 01/28/24  0830 01/29/24  0023   WBC 6.9 7.4 6.5   HGB 13.3 13.1 13.2   HCT 40.7 40.3 40.0    176 177       Recent Labs     01/27/24  0100 01/27/24  1035 01/27/24  1811 01/28/24  0555 01/28/24  0830 01/28/24  1620 01/29/24  0023    140   < > 138 139 141 139   K 2.7* 2.7*   < > 2.9* 3.0* 3.0* 3.3*   CL 89* 91*   < > 99 98 99 97   CO2 39* 37*   < > 29 31 34* 31   BUN 37* 47*   < > 54* 54* 57* 58*   MG  --  2.9*  
capsule 1 capsule  1 capsule Oral Daily with breakfast    amLODIPine (NORVASC) tablet 5 mg  5 mg Oral Daily    PN-Adult Premix 5/15 - Standard Electrolytes - Central Line   IntraVENous Continuous TPN    predniSONE (DELTASONE) tablet 10 mg  10 mg Oral Daily    labetalol (NORMODYNE;TRANDATE) injection 20 mg  20 mg IntraVENous Q4H PRN    insulin lispro (HUMALOG) injection vial 0-8 Units  0-8 Units SubCUTAneous Q6H    metoclopramide (REGLAN) injection 5 mg  5 mg IntraVENous Q8H    labetalol (NORMODYNE) tablet 100 mg  100 mg Oral 2 times per day    prochlorperazine (COMPAZINE) injection 10 mg  10 mg IntraVENous Q6H PRN    famotidine (PEPCID) 20 mg in sodium chloride (PF) 0.9 % 10 mL injection  20 mg IntraVENous Daily    pantoprazole (PROTONIX) 40 mg in sodium chloride (PF) 0.9 % 10 mL injection  40 mg IntraVENous QAM AC    [Held by provider] enoxaparin (LOVENOX) injection 100 mg  1 mg/kg SubCUTAneous BID    piperacillin-tazobactam (ZOSYN) 3,375 mg in sodium chloride 0.9 % 50 mL IVPB (mini-bag)  3,375 mg IntraVENous Q8H    HYDROmorphone (DILAUDID) injection 0.5 mg  0.5 mg IntraVENous Q3H PRN    Or    HYDROmorphone HCl PF (DILAUDID) injection 1 mg  1 mg IntraVENous Q3H PRN    LORazepam (ATIVAN) injection 0.5 mg  0.5 mg IntraVENous Q4H PRN    sodium chloride flush 0.9 % injection 5-40 mL  5-40 mL IntraVENous 2 times per day    sodium chloride flush 0.9 % injection 5-40 mL  5-40 mL IntraVENous PRN    0.9 % sodium chloride infusion   IntraVENous PRN    ondansetron (ZOFRAN-ODT) disintegrating tablet 4 mg  4 mg Oral Q8H PRN    Or    ondansetron (ZOFRAN) injection 4 mg  4 mg IntraVENous Q6H PRN    polyethylene glycol (GLYCOLAX) packet 17 g  17 g Oral Daily PRN    acetaminophen (TYLENOL) tablet 650 mg  650 mg Oral Q6H PRN    Or    acetaminophen (TYLENOL) suppository 650 mg  650 mg Rectal Q6H PRN    glucose chewable tablet 16 g  4 tablet Oral PRN    dextrose bolus 10% 125 mL  125 mL IntraVENous PRN    Or    dextrose bolus 10% 250 
MD  2/7/2024        [facility]:  [email or web address]  [email or web address]    [place] office:  [address], [address]  [place] [address]  Phone: [phone]  Fax :     [phone]    [place] office:  [address] [place] [address]  Phone - [phone]  Fax - [phone]                                                                                         
STANDARD TWO VW 03/17/2021    Narrative  Chest 2 views.    No prior comparison study in [facility] PACS.    Elevation right hemidiaphragm.  Lungs clear; no interstitial or alveolar pulmonary edema.  Cardiac and mediastinal structures are within normal limits.  No pneumothorax or sizable pleural effusion.  Thoracic spondylosis.      CT Result (most recent):  CT ABDOMEN PELVIS WO IV CONTRAST 01/27/2024    Narrative  EXAM: CT ABDOMEN PELVIS WO CONTRAST    INDICATION: abd pain    COMPARISON: None    IV CONTRAST: None.    ORAL CONTRAST: None    TECHNIQUE:  Thin axial images were obtained through the abdomen and pelvis. Coronal and  sagittal reformats were generated. CT dose reduction was achieved through use of  a standardized protocol tailored for this examination and automatic exposure  control for dose modulation.    The absence of intravenous contrast material reduces the sensitivity for  evaluation of the vasculature and solid organs.    FINDINGS:  LOWER THORAX: Moderate coronary artery calcifications.  LIVER: Contains multiple subcentimeter hypodensities, too small to characterize,  but likely cysts.  BILIARY TREE: Gallbladder is within normal limits. CBD is not dilated.  SPLEEN: within normal limits.  PANCREAS: No focal abnormality.  ADRENALS: Unremarkable.  KIDNEYS/URETERS: No hydronephrosis. 2 mm nonobstructing left renal calculus.  Small bilateral simple renal cysts do not require imaging follow-up.  STOMACH: Dilated and fluid-filled.  SMALL BOWEL: Dilated, fluid-filled bowel loops are seen throughout the abdomen,  with a probable transition point in the right lower quadrant. The distal small  bowel is decompressed.  COLON: No dilatation or wall thickening.  APPENDIX: Normal.  PERITONEUM: No ascites or pneumoperitoneum.  RETROPERITONEUM: No lymphadenopathy or aortic aneurysm.  REPRODUCTIVE ORGANS: Unremarkable.  URINARY BLADDER: Decompressed  BONES: No destructive bone lesion.  ABDOMINAL WALL: Fat-containing right 
below)  ______________________________________________________________________    Medications:     Current Facility-Administered Medications   Medication Dose Route Frequency    silver nitrate applicators applicator 1 each  1 each Topical Once    silver nitrate applicators applicator 1 each  1 each Topical Once    famotidine (PEPCID) tablet 20 mg  20 mg Oral Daily    metoclopramide (REGLAN) tablet 5 mg  5 mg Oral q8h    pantoprazole (PROTONIX) tablet 40 mg  40 mg Oral QAM AC    lactobacillus (CULTURELLE) capsule 1 capsule  1 capsule Oral Daily with breakfast    amLODIPine (NORVASC) tablet 5 mg  5 mg Oral Daily    predniSONE (DELTASONE) tablet 10 mg  10 mg Oral Daily    labetalol (NORMODYNE;TRANDATE) injection 20 mg  20 mg IntraVENous Q4H PRN    labetalol (NORMODYNE) tablet 100 mg  100 mg Oral 2 times per day    prochlorperazine (COMPAZINE) injection 10 mg  10 mg IntraVENous Q6H PRN    [Held by provider] enoxaparin (LOVENOX) injection 100 mg  1 mg/kg SubCUTAneous BID    piperacillin-tazobactam (ZOSYN) 3,375 mg in sodium chloride 0.9 % 50 mL IVPB (mini-bag)  3,375 mg IntraVENous Q8H    HYDROmorphone (DILAUDID) injection 0.5 mg  0.5 mg IntraVENous Q3H PRN    Or    HYDROmorphone HCl PF (DILAUDID) injection 1 mg  1 mg IntraVENous Q3H PRN    LORazepam (ATIVAN) injection 0.5 mg  0.5 mg IntraVENous Q4H PRN    sodium chloride flush 0.9 % injection 5-40 mL  5-40 mL IntraVENous 2 times per day    sodium chloride flush 0.9 % injection 5-40 mL  5-40 mL IntraVENous PRN    0.9 % sodium chloride infusion   IntraVENous PRN    ondansetron (ZOFRAN-ODT) disintegrating tablet 4 mg  4 mg Oral Q8H PRN    Or    ondansetron (ZOFRAN) injection 4 mg  4 mg IntraVENous Q6H PRN    polyethylene glycol (GLYCOLAX) packet 17 g  17 g Oral Daily PRN    acetaminophen (TYLENOL) tablet 650 mg  650 mg Oral Q6H PRN    Or    acetaminophen (TYLENOL) suppository 650 mg  650 mg Rectal Q6H PRN    glucose chewable tablet 16 g  4 tablet Oral PRN    dextrose bolus 
mg IntraVENous Q3H PRN    Or    HYDROmorphone HCl PF (DILAUDID) injection 1 mg  1 mg IntraVENous Q3H PRN    LORazepam (ATIVAN) injection 0.5 mg  0.5 mg IntraVENous Q4H PRN    sodium chloride flush 0.9 % injection 5-40 mL  5-40 mL IntraVENous 2 times per day    sodium chloride flush 0.9 % injection 5-40 mL  5-40 mL IntraVENous PRN    0.9 % sodium chloride infusion   IntraVENous PRN    ondansetron (ZOFRAN-ODT) disintegrating tablet 4 mg  4 mg Oral Q8H PRN    Or    ondansetron (ZOFRAN) injection 4 mg  4 mg IntraVENous Q6H PRN    polyethylene glycol (GLYCOLAX) packet 17 g  17 g Oral Daily PRN    acetaminophen (TYLENOL) tablet 650 mg  650 mg Oral Q6H PRN    Or    acetaminophen (TYLENOL) suppository 650 mg  650 mg Rectal Q6H PRN    glucose chewable tablet 16 g  4 tablet Oral PRN    dextrose bolus 10% 125 mL  125 mL IntraVENous PRN    Or    dextrose bolus 10% 250 mL  250 mL IntraVENous PRN    glucagon injection 1 mg  1 mg SubCUTAneous PRN    dextrose 10 % infusion   IntraVENous Continuous PRN        Lab Data Reviewed: (see below)  Lab Review:     Recent Labs     02/10/24  0415 02/10/24  1109 02/11/24  0539 02/12/24  0109   WBC 14.1*  --  15.3* 16.0*   HGB 7.1* 7.7* 7.8* 7.5*   HCT 22.4* 24.0* 23.6* 23.4*     --  303 303     Recent Labs     02/10/24  0415 02/11/24  0539 02/12/24  0109    141 139   K 3.2* 3.3* 3.4*   * 110* 109*   CO2 27 25 24   GLUCOSE 109* 110* 158*   BUN 52* 45* 48*   CREATININE 1.99* 1.81* 1.73*   CALCIUM 7.4* 8.3* 8.1*   MG 2.0  --   --      Lab Results   Component Value Date/Time    GLUCOSE 158 02/12/2024 01:09 AM    GLUCOSE 110 02/11/2024 05:39 AM    GLUCOSE 109 02/10/2024 04:15 AM    GLUCOSE 92 02/09/2024 05:50 AM    GLUCOSE 229 02/08/2024 12:38 AM     No results for input(s): \"PH\", \"PCO2\", \"PO2\", \"HCO3\", \"FIO2\" in the last 72 hours.  No results for input(s): \"INR\" in the last 72 hours.  Results       Procedure Component Value Units Date/Time    Culture, Blood 1 [[id number]] 
g Oral Daily PRN    acetaminophen (TYLENOL) tablet 650 mg  650 mg Oral Q6H PRN    Or    acetaminophen (TYLENOL) suppository 650 mg  650 mg Rectal Q6H PRN    glucose chewable tablet 16 g  4 tablet Oral PRN    dextrose bolus 10% 125 mL  125 mL IntraVENous PRN    Or    dextrose bolus 10% 250 mL  250 mL IntraVENous PRN    glucagon injection 1 mg  1 mg SubCUTAneous PRN    dextrose 10 % infusion   IntraVENous Continuous PRN        Lab Review:     Recent Labs     02/07/24 0136 02/08/24  0038 02/09/24  0550   WBC 16.0* 17.4* 14.5*   HGB 9.5* 9.2* 8.1*   HCT 30.2* 27.9* 25.0*    273 274     Recent Labs     02/07/24 0136 02/08/24  0038 02/09/24  0550    135* 138   K 3.7 3.3* 2.9*    105 106   CO2 23 22 27   BUN 48* 51* 56*   MG 2.1 2.0 2.0   PHOS 4.3 4.1 4.6   ALT  --  18 20     No components found for: \"GLPOC\"      
tube suction. IV Zosyn, IV Dilaudid PRN severe pain. Continue PPN at 75 mls/hr. May need a central line in AM with IR for TPN. Ambulate as tolerated.         Persistent atrial fibrillation POA: now rate better controlled. Echo showed EF 65-70% with concentric hypertrophy. TSH is normal. Continue Diltiazem gtt non titratable and heparin gtts. Cardiology following. Monitor    CHETNA (acute kidney injury) / CKD (chronic kidney disease) stage 3, GFR 30-59 ml/min POA: SCR improving. Continue IV fluids. Monitor  Nephrology following    Primary hypertension / Pure hypercholesterolemia POA: Continue Diltiazem gtt    Polymyalgia rheumatica/ Chronic shoulder pain POA: stable. Prednisone on hold.     Left sided weakness POA: unknown duration. CT scan head neg. MRI once hemodynamically stable     Care Plan discussed with: Patient, Nursing, CM     Prophylaxis:  heparin gtt                 Anticipated Disposition:  SNF/LTC     PCP:      Saurabh Goldstein MD     I have personally examined and treated the patient at bedside during this period. To assist coordination of care and communication with nursing and staff, this note may be preliminary early in the day, but finalized by end of the day.         ___________________________________________________    Attending Physician:   Dawson Robertson MD

## 2024-02-12 NOTE — CARE COORDINATION
CM Note:  [id number] requested by insurance for [facility] IPR.  Attending to call [phone] ext [phone] by 11:30 a.m.  Reference #[id number].  PT to damian Figueroa RN

## 2024-02-12 NOTE — CARE COORDINATION
CM Note:  Pt to d/c home today.  His wife will transport him.  Order for hh PT/OT noted.  Unable to find a hh agency in his area who is in network with his insurance.  Pt was given a 30-day Eliquis coupon.  Spoke with PT/OT who endorse him going to OP therapy.    Pt to follow up OP.  IVY Figueroa

## 2024-02-12 NOTE — DISCHARGE SUMMARY
Physician Discharge Summary     Patient ID:  Tim Morales Sr.  [id number]  68 y.o.  1955    Admit date: 1/27/2024    Discharge date of service and time: 2/12/2024  Greater than 30 minutes were spent providing discharge related services for this patient    Admission Diagnoses: Small bowel obstruction (HCC) [K56.609]    Discharge Diagnoses:    Principal Diagnosis   Small bowel obstruction (HCC)                                             Hospital Course and other diagnoses  Small bowel obstruction / Acute malnutrition - POA.  Surgery consulted. Had exploratory laparotomy and lysis of adhesions Jan 31. Complicated by postoperative persistent ileus.  NG tube removed Feb 6.  Currently tolerating regular diet.  Completed empiric Zosyn.  Getting pepcid, PPI and reglan     Debilitated patient / Polymyalgia rheumatica / Chronic left-sided weakness / Leukocytosis - Worse than baseline post op.  Continue chronic prednisone. CT/MRI both negative for acute pathology. Apprecaite PT OT.  Denied IPR by insurance.  Arrange  PT OT     Persistent atrial fibrillation / hypertension / Chronic anticoagulation - RVR POA, due to SBO.  Initially required diltiazem drip. EF 65-70%.  Rate now stable on labetalol and Norvasc. Resumed Eliquis. We stopped aspirin, olmesartan and hydralazine.     Anemia - Noted after surgery and hydration.  No bleeding noted. Hb stable.     Acute kidney injury on chronic kidney disease 3 / Metabolic acidosis / Hypokalemia - POA and now slowly better.  Continue to encourage PO fluid.      Hyperglycemia - Due to TPN.  Now better.  Not DM. A1c of 6.2     PCP: Saurabh Goldstein MD    Consults: general surgery    Significant Diagnostic Studies: See Hospital Course    Discharged home in improved condition.    Discharge Exam:  BP: 129/76   Pulse: 89   Resp: 19   Temp: 98.4 °F (36.9 °C)   TempSrc: Oral   SpO2: 98%   Weight:  97 kg   Height:  180 cm         Gen:  Obese, in no acute distress  HEENT:  [place]

## 2024-02-12 NOTE — DISCHARGE INSTRUCTIONS
provided by the pharmacist and keep a list of the medication names, dosages, and times to be taken in your wallet.   Do not take other medications without consulting your doctor.       If you experience any of the following symptoms then please call your primary care physician or return to the emergency room if you cannot get hold of your doctor:  Fever, chills, nausea, vomiting, diarrhea, change in mentation, falling, bleeding, shortness of breath    Follow Up:  Please call the below provider to arrange hospital follow up appointment      Saurabh Goldstein MD  [address]   [facility] [address] [phone]    Schedule an appointment as soon as possible for a visit      Tommy Tomas [place]  [address] [address] [address] [phone]    Schedule an appointment as soon as possible for a visit  If symptoms worsen      For questions regarding your Hospitalization or to contact the Hospital Medicine team, please call [phone].      Information obtained by :  I understand that if any problems occur once I am at home I am to contact my physician.    I understand and acknowledge receipt of the instructions indicated above.                                                                                                                                           Physician's or R.N.'s Signature                                                                  Date/Time                                                                                                                                              Patient or Representative Signature                                                          Date/Time

## 2024-02-12 NOTE — PLAN OF CARE
Problem: Discharge Planning  Goal: Discharge to home or other facility with appropriate resources  2/3/2024 1733 by Keysha Todd RN  Outcome: Progressing  2/3/2024 1732 by Keysha Todd RN  Outcome: Progressing     Problem: Safety - Adult  Goal: Free from fall injury  2/3/2024 1733 by Keysha Todd RN  Outcome: Progressing  2/3/2024 0409 by Gabby Vidales RN  Outcome: Progressing     Problem: Pain  Goal: Verbalizes/displays adequate comfort level or baseline comfort level  2/3/2024 1733 by Keysha Todd RN  Outcome: Progressing  2/3/2024 0409 by Gabby Vidales RN  Outcome: Progressing     Problem: Skin/Tissue Integrity  Goal: Absence of new skin breakdown  Description: 1.  Monitor for areas of redness and/or skin breakdown  2.  Assess vascular access sites hourly  3.  Every 4-6 hours minimum:  Change oxygen saturation probe site  4.  Every 4-6 hours:  If on nasal continuous positive airway pressure, respiratory therapy assess nares and determine need for appliance change or resting period.  Outcome: Progressing     
  Problem: Discharge Planning  Goal: Discharge to home or other facility with appropriate resources  2/8/2024 1547 by Karson Burns RN  Outcome: Progressing  Flowsheets (Taken 2/8/2024 0826)  Discharge to home or other facility with appropriate resources: Identify barriers to discharge with patient and caregiver  2/8/2024 0442 by Kristy Santoro RN  Outcome: Progressing     Problem: Safety - Adult  Goal: Free from fall injury  2/8/2024 1547 by Karson Burns RN  Outcome: Progressing  2/8/2024 0442 by Kristy Santoro RN  Outcome: Progressing     Problem: Pain  Goal: Verbalizes/displays adequate comfort level or baseline comfort level  2/8/2024 1547 by Karson Burns RN  Outcome: Progressing  2/8/2024 0442 by Kristy Santoro RN  Outcome: Progressing     Problem: Skin/Tissue Integrity  Goal: Absence of new skin breakdown  Description: 1.  Monitor for areas of redness and/or skin breakdown  2.  Assess vascular access sites hourly  3.  Every 4-6 hours minimum:  Change oxygen saturation probe site  4.  Every 4-6 hours:  If on nasal continuous positive airway pressure, respiratory therapy assess nares and determine need for appliance change or resting period.  2/8/2024 1547 by Karson Burns RN  Outcome: Progressing  2/8/2024 0442 by Kristy Santoro RN  Outcome: Progressing     Problem: Physical Therapy - Adult  Goal: By Discharge: Performs mobility at highest level of function for planned discharge setting.  See evaluation for individualized goals.  Description: FUNCTIONAL STATUS PRIOR TO ADMISSION: Patient was independent and active without use of DME.    HOME SUPPORT PRIOR TO ADMISSION: The patient lived with wife and son but did not require assistance.    Physical Therapy Goals  Revised 2/8/24  1.  Patient will move from supine to sit and sit to supine, scoot up and down, and roll side to side in bed with supervision within 7 day(s).    2.  Patient will perform sit to stand with stand by assist within 
  Problem: Discharge Planning  Goal: Discharge to home or other facility with appropriate resources  Outcome: Not Progressing     Problem: Pain  Goal: Verbalizes/displays adequate comfort level or baseline comfort level  2/4/2024 1032 by Nahomi Marrero RN  Outcome: Not Progressing  2/3/2024 2353 by Radha Bray RN  Outcome: Progressing     Problem: Nutrition Deficit:  Goal: Optimize nutritional status  Outcome: Not Progressing     Problem: Safety - Adult  Goal: Free from fall injury  2/4/2024 1032 by Nahomi Marrero RN  Outcome: Progressing  2/3/2024 2353 by Radha Bray RN  Outcome: Progressing     Problem: Skin/Tissue Integrity  Goal: Absence of new skin breakdown  Description: 1.  Monitor for areas of redness and/or skin breakdown  2.  Assess vascular access sites hourly  3.  Every 4-6 hours minimum:  Change oxygen saturation probe site  4.  Every 4-6 hours:  If on nasal continuous positive airway pressure, respiratory therapy assess nares and determine need for appliance change or resting period.  Outcome: Progressing     Problem: Discharge Planning  Goal: Discharge to home or other facility with appropriate resources  Outcome: Not Progressing     Problem: Pain  Goal: Verbalizes/displays adequate comfort level or baseline comfort level  2/4/2024 1032 by Nahomi Marrero RN  Outcome: Not Progressing  2/3/2024 2353 by Radha Bray, RN  Outcome: Progressing     Problem: Nutrition Deficit:  Goal: Optimize nutritional status  Outcome: Not Progressing     
  Problem: Discharge Planning  Goal: Discharge to home or other facility with appropriate resources  Outcome: Progressing     Problem: Safety - Adult  Goal: Free from fall injury  1/29/2024 1234 by Keysha Todd, RN  Outcome: Progressing  1/29/2024 0607 by Gabby Vidales, RN  Outcome: Progressing     Problem: Pain  Goal: Verbalizes/displays adequate comfort level or baseline comfort level  Outcome: Progressing     Problem: Skin/Tissue Integrity  Goal: Absence of new skin breakdown  Description: 1.  Monitor for areas of redness and/or skin breakdown  2.  Assess vascular access sites hourly  3.  Every 4-6 hours minimum:  Change oxygen saturation probe site  4.  Every 4-6 hours:  If on nasal continuous positive airway pressure, respiratory therapy assess nares and determine need for appliance change or resting period.  Outcome: Progressing     
  Problem: Discharge Planning  Goal: Discharge to home or other facility with appropriate resources  Outcome: Progressing     Problem: Safety - Adult  Goal: Free from fall injury  Outcome: Progressing     Problem: Pain  Goal: Verbalizes/displays adequate comfort level or baseline comfort level  Outcome: Progressing     Problem: Skin/Tissue Integrity  Goal: Absence of new skin breakdown  Description: 1.  Monitor for areas of redness and/or skin breakdown  2.  Assess vascular access sites hourly  3.  Every 4-6 hours minimum:  Change oxygen saturation probe site  4.  Every 4-6 hours:  If on nasal continuous positive airway pressure, respiratory therapy assess nares and determine need for appliance change or resting period.  Outcome: Progressing     
  Problem: Discharge Planning  Goal: Discharge to home or other facility with appropriate resources  Outcome: Progressing     Problem: Safety - Adult  Goal: Free from fall injury  Outcome: Progressing     Problem: Pain  Goal: Verbalizes/displays adequate comfort level or baseline comfort level  Outcome: Progressing     Problem: Skin/Tissue Integrity  Goal: Absence of new skin breakdown  Description: 1.  Monitor for areas of redness and/or skin breakdown  2.  Assess vascular access sites hourly  3.  Every 4-6 hours minimum:  Change oxygen saturation probe site  4.  Every 4-6 hours:  If on nasal continuous positive airway pressure, respiratory therapy assess nares and determine need for appliance change or resting period.  Outcome: Progressing     Problem: Physical Therapy - Adult  Goal: By Discharge: Performs mobility at highest level of function for planned discharge setting.  See evaluation for individualized goals.  Description: FUNCTIONAL STATUS PRIOR TO ADMISSION: Patient was independent and active without use of DME.    HOME SUPPORT PRIOR TO ADMISSION: The patient lived with wife and son but did not require assistance.    Physical Therapy Goals  Initiated 2/1/2024  1.  Patient will move from supine to sit and sit to supine, scoot up and down, and roll side to side in bed with minimal assistance within 7 day(s).    2.  Patient will perform sit to stand with minimal assistance within 7 day(s).  3.  Patient will transfer from bed to chair and chair to bed with minimal assistance using the least restrictive device within 7 day(s).  4.  Patient will ambulate with minimal assistance for 100 feet with the least restrictive device within 7 day(s).   5.  Patient will ascend/descend 4 stairs with 1 handrail(s) with moderate assistance within 7 day(s).    2/7/2024 1448 by Karen Velez, PTA  Outcome: Progressing     Problem: Occupational Therapy - Adult  Goal: By Discharge: Performs self-care activities at highest 
  Problem: Discharge Planning  Goal: Discharge to home or other facility with appropriate resources  Outcome: Progressing  Flowsheets (Taken 2/1/2024 1716)  Discharge to home or other facility with appropriate resources: Identify barriers to discharge with patient and caregiver     Problem: Safety - Adult  Goal: Free from fall injury  Outcome: Progressing     Problem: Pain  Goal: Verbalizes/displays adequate comfort level or baseline comfort level  Outcome: Progressing     Problem: Skin/Tissue Integrity  Goal: Absence of new skin breakdown  Description: 1.  Monitor for areas of redness and/or skin breakdown  2.  Assess vascular access sites hourly  3.  Every 4-6 hours minimum:  Change oxygen saturation probe site  4.  Every 4-6 hours:  If on nasal continuous positive airway pressure, respiratory therapy assess nares and determine need for appliance change or resting period.  Outcome: Progressing     
  Problem: Discharge Planning  Goal: Discharge to home or other facility with appropriate resources  Outcome: Progressing  Flowsheets (Taken 2/7/2024 0745)  Discharge to home or other facility with appropriate resources: Identify barriers to discharge with patient and caregiver     Problem: Safety - Adult  Goal: Free from fall injury  Outcome: Progressing     Problem: Pain  Goal: Verbalizes/displays adequate comfort level or baseline comfort level  Outcome: Progressing  Flowsheets  Taken 2/7/2024 0400 by Amaris Schuler, RN  Verbalizes/displays adequate comfort level or baseline comfort level: Assess pain using appropriate pain scale  Taken 2/7/2024 0329 by Amaris Schuler, RN  Verbalizes/displays adequate comfort level or baseline comfort level: Assess pain using appropriate pain scale  Taken 2/6/2024 2313 by Amaris Schuler, RN  Verbalizes/displays adequate comfort level or baseline comfort level: Assess pain using appropriate pain scale     Problem: Skin/Tissue Integrity  Goal: Absence of new skin breakdown  Description: 1.  Monitor for areas of redness and/or skin breakdown  2.  Assess vascular access sites hourly  3.  Every 4-6 hours minimum:  Change oxygen saturation probe site  4.  Every 4-6 hours:  If on nasal continuous positive airway pressure, respiratory therapy assess nares and determine need for appliance change or resting period.  Outcome: Progressing     Problem: Nutrition Deficit:  Goal: Optimize nutritional status  Outcome: Progressing     
  Problem: Occupational Therapy - Adult  Goal: By Discharge: Performs self-care activities at highest level of function for planned discharge setting.  See evaluation for individualized goals.  Description: FUNCTIONAL STATUS PRIOR TO ADMISSION:  Patient was independent with ADLs, IADLs, and functional mobility/ ambulatory without AD.  Retired, active.    HOME SUPPORT: Patient resided with his wife and did not require assistance.    Occupational Therapy Goals:  Initiated 2/1/2024  1.  Patient will perform grooming standing at sink with Contact Guard Assist within 7 day(s).  2.  Patient will perform upper body dressing with Set-up within 7 day(s).  3.  Patient will perform lower body dressing with Minimal Assist within 7 day(s).  4.  Patient will perform toilet transfers with Minimal Assist  within 7 day(s).  5.  Patient will perform all aspects of toileting with Minimal Assist within 7 day(s).  6.  Patient will perform bathing with minimal assist within 7 days.  7.  Patient will participate in upper extremity therapeutic exercise/activities with Supervision for 10 minutes within 7 day(s).      Outcome: Progressing     OCCUPATIONAL THERAPY EVALUATION    Patient: Tim Morales  (68 y.o. male)  Date: 2/1/2024  Primary Diagnosis: Small bowel obstruction (HCC) [K56.609]  Procedure(s) (LRB):  EXPLORATORY LAPAROTOMY, EXTENSIVE LYSIS OF ADHESIONS, APPENDECTOMY (N/A) 1 Day Post-Op     Precautions: fall, NPO    ASSESSMENT :  Patient seen for OT evaluation POD 1 above surgery s/p admission for SBO.  He presents for OT evaluation with pain (abdominal and L knee), impaired reach for UB and LB ADLs, mild-moderate L hemiparesis (not at baseline, LUE overall 3- to 3+/5 with ~80 degrees shoulder AROM flexion/ abduction and 3+ L , note brain MRI pending), L hand palmar paresthesia, impaired LUE coordination, and impaired balance.  He followed all commands and participated well.  He required mod-maxA for supine<>sit 
  Problem: Occupational Therapy - Adult  Goal: By Discharge: Performs self-care activities at highest level of function for planned discharge setting.  See evaluation for individualized goals.  Description: FUNCTIONAL STATUS PRIOR TO ADMISSION:  Patient was independent with ADLs, IADLs, and functional mobility/ ambulatory without AD.  Retired, active.    HOME SUPPORT: Patient resided with his wife and did not require assistance.    Occupational Therapy Goals:  Initiated 2/1/2024  1.  Patient will perform grooming standing at sink with Contact Guard Assist within 7 day(s).  2.  Patient will perform upper body dressing with Set-up within 7 day(s).  3.  Patient will perform lower body dressing with Minimal Assist within 7 day(s).  4.  Patient will perform toilet transfers with Minimal Assist  within 7 day(s).  5.  Patient will perform all aspects of toileting with Minimal Assist within 7 day(s).  6.  Patient will perform bathing with minimal assist within 7 days.  7.  Patient will participate in upper extremity therapeutic exercise/activities with Supervision for 10 minutes within 7 day(s).      Outcome: Progressing   OCCUPATIONAL THERAPY TREATMENT  Patient: Tim Morales  (68 y.o. male)  Date: 2/7/2024  Primary Diagnosis: Small bowel obstruction (HCC) [K56.609]  Procedure(s) (LRB):  EXPLORATORY LAPAROTOMY, EXTENSIVE LYSIS OF ADHESIONS, APPENDECTOMY (N/A) 7 Days Post-Op   Precautions:  (falls, NGT, L side weakness)                Chart, occupational therapy assessment, plan of care, and goals were reviewed.    ASSESSMENT  Patient continues to benefit from skilled OT services and is progressing towards goals but remains limited by standing balance, activity tolerance, functional reach, distal LE access, and incontinence. Patient received incontinent of bowels, completed bed level toileting total A (min A for rolling L/R), agreeable to have additional BM on BSC, completed supine>sit>BSC min A, then standing bowel 
  Problem: Occupational Therapy - Adult  Goal: By Discharge: Performs self-care activities at highest level of function for planned discharge setting.  See evaluation for individualized goals.  Description: FUNCTIONAL STATUS PRIOR TO ADMISSION:  Patient was independent with ADLs, IADLs, and functional mobility/ ambulatory without AD.  Retired, active.    HOME SUPPORT: Patient resided with his wife and did not require assistance.    Occupational Therapy Goals:  Weekly 2/8/2023, goals updated  1.  Patient will perform grooming standing at sink 5 minutes with Contact Guard Assist within 7 day(s).  2.  Patient will perform upper body dressing with Set-up within 7 day(s).  3.  Patient will perform lower body dressing with Minimal Assist and appropriate AE within 7 day(s).  4.  Patient will perform toilet transfers with Contact Guard Assist  within 7 day(s).  5.  Patient will perform all aspects of toileting with Minimal Assist within 7 day(s).  6.  Patient will perform bathing with Minimal assist within 7 days.  7.  Patient will participate in upper extremity therapeutic exercise/activities with Supervision for 10 minutes within 7 day(s).      Initiated 2/1/2024  1.  Patient will perform grooming standing at sink with Contact Guard Assist within 7 day(s).  2.  Patient will perform upper body dressing with Set-up within 7 day(s).  3.  Patient will perform lower body dressing with Minimal Assist within 7 day(s).  4.  Patient will perform toilet transfers with Minimal Assist  within 7 day(s).  5.  Patient will perform all aspects of toileting with Minimal Assist within 7 day(s).  6.  Patient will perform bathing with minimal assist within 7 days.  7.  Patient will participate in upper extremity therapeutic exercise/activities with Supervision for 10 minutes within 7 day(s).      Outcome: Progressing     OCCUPATIONAL THERAPY TREATMENT  Patient: Tim Morales  (68 y.o. male)  Date: 2/12/2024  Primary Diagnosis: Small 
  Problem: Occupational Therapy - Adult  Goal: By Discharge: Performs self-care activities at highest level of function for planned discharge setting.  See evaluation for individualized goals.  Description: FUNCTIONAL STATUS PRIOR TO ADMISSION:  Patient was independent with ADLs, IADLs, and functional mobility/ ambulatory without AD.  Retired, active.    HOME SUPPORT: Patient resided with his wife and did not require assistance.    Occupational Therapy Goals:  Weekly 2/8/2023, goals updated  1.  Patient will perform grooming standing at sink 5 minutes with Contact Guard Assist within 7 day(s).  2.  Patient will perform upper body dressing with Set-up within 7 day(s).  3.  Patient will perform lower body dressing with Minimal Assist and appropriate AE within 7 day(s).  4.  Patient will perform toilet transfers with Contact Guard Assist  within 7 day(s).  5.  Patient will perform all aspects of toileting with Minimal Assist within 7 day(s).  6.  Patient will perform bathing with Minimal assist within 7 days.  7.  Patient will participate in upper extremity therapeutic exercise/activities with Supervision for 10 minutes within 7 day(s).      Initiated 2/1/2024  1.  Patient will perform grooming standing at sink with Contact Guard Assist within 7 day(s).  2.  Patient will perform upper body dressing with Set-up within 7 day(s).  3.  Patient will perform lower body dressing with Minimal Assist within 7 day(s).  4.  Patient will perform toilet transfers with Minimal Assist  within 7 day(s).  5.  Patient will perform all aspects of toileting with Minimal Assist within 7 day(s).  6.  Patient will perform bathing with minimal assist within 7 days.  7.  Patient will participate in upper extremity therapeutic exercise/activities with Supervision for 10 minutes within 7 day(s).      Outcome: Progressing   OCCUPATIONAL THERAPY TREATMENT: WEEKLY REASSESSMENT    Patient: Tim Morales  (68 y.o. male)  Date: 2/8/2024  Primary 
  Problem: Physical Therapy - Adult  Goal: By Discharge: Performs mobility at highest level of function for planned discharge setting.  See evaluation for individualized goals.  Description: FUNCTIONAL STATUS PRIOR TO ADMISSION: Patient was independent and active without use of DME.    HOME SUPPORT PRIOR TO ADMISSION: The patient lived with wife and son but did not require assistance.    Physical Therapy Goals  Initiated 2/1/2024  1.  Patient will move from supine to sit and sit to supine, scoot up and down, and roll side to side in bed with minimal assistance within 7 day(s).    2.  Patient will perform sit to stand with minimal assistance within 7 day(s).  3.  Patient will transfer from bed to chair and chair to bed with minimal assistance using the least restrictive device within 7 day(s).  4.  Patient will ambulate with minimal assistance for 100 feet with the least restrictive device within 7 day(s).   5.  Patient will ascend/descend 4 stairs with 1 handrail(s) with moderate assistance within 7 day(s).    Outcome: Progressing   PHYSICAL THERAPY TREATMENT    Patient: Tim Morales Sr. (68 y.o. male)  Date: 2/6/2024  Diagnosis: Small bowel obstruction (HCC) [K56.609] Small bowel obstruction (HCC)  Procedure(s) (LRB):  EXPLORATORY LAPAROTOMY, EXTENSIVE LYSIS OF ADHESIONS, APPENDECTOMY (N/A) 6 Days Post-Op  Precautions:  (falls, NGT, L side weakness)                      ASSESSMENT:  Pt tolerated PT services well and continues to progress toward PT POC goals. Progress to date less than anticipated per underlying medical status and report of L side weakness and LLE pain. (RN/MD Teams already aware and following). Pt denies decreased sensation. Cursory Joslyn's' Test undertaken and negative.  Pt confirms \"independent with everything before all of this\". Pt resides with his wife in a 1SH with stairs to enter. ModA/maxA x2 required overall for functional mobility tasks on this date including bed mobility, positioning, 
  Problem: Physical Therapy - Adult  Goal: By Discharge: Performs mobility at highest level of function for planned discharge setting.  See evaluation for individualized goals.  Description: FUNCTIONAL STATUS PRIOR TO ADMISSION: Patient was independent and active without use of DME.    HOME SUPPORT PRIOR TO ADMISSION: The patient lived with wife and son but did not require assistance.    Physical Therapy Goals  Initiated 2/1/2024  1.  Patient will move from supine to sit and sit to supine, scoot up and down, and roll side to side in bed with minimal assistance within 7 day(s).    2.  Patient will perform sit to stand with minimal assistance within 7 day(s).  3.  Patient will transfer from bed to chair and chair to bed with minimal assistance using the least restrictive device within 7 day(s).  4.  Patient will ambulate with minimal assistance for 100 feet with the least restrictive device within 7 day(s).   5.  Patient will ascend/descend 4 stairs with 1 handrail(s) with moderate assistance within 7 day(s).    Outcome: Progressing   PHYSICAL THERAPY TREATMENT    Patient: Tim Morales Sr. (68 y.o. male)  Date: 2/7/2024  Diagnosis: Small bowel obstruction (HCC) [K56.609] Small bowel obstruction (HCC)  Procedure(s) (LRB):  EXPLORATORY LAPAROTOMY, EXTENSIVE LYSIS OF ADHESIONS, APPENDECTOMY (N/A) 7 Days Post-Op  Precautions:  (falls, NGT, L side weakness)                      ASSESSMENT:  Patient continues to benefit from skilled PT services and is slowly progressing towards goals. Received in bed, incontinent of bowel and bladder.  Pt required decreased assistance with bed mobility for hygiene and chux change. Use of RW for steadying with verbal cues for hand placement and safe technique with Min A x 1 for transfers and short distance gait training with second person to assist with management of lines/leads. BP elevated throughout session, see flowsheet, RN notified. Denies feeling SOB, dizzy or lightheaded.  Assisted 
  Problem: Physical Therapy - Adult  Goal: By Discharge: Performs mobility at highest level of function for planned discharge setting.  See evaluation for individualized goals.  Description: FUNCTIONAL STATUS PRIOR TO ADMISSION: Patient was independent and active without use of DME.    HOME SUPPORT PRIOR TO ADMISSION: The patient lived with wife and son but did not require assistance.    Physical Therapy Goals  Initiated 2/1/2024  1.  Patient will move from supine to sit and sit to supine, scoot up and down, and roll side to side in bed with minimal assistance within 7 day(s).    2.  Patient will perform sit to stand with minimal assistance within 7 day(s).  3.  Patient will transfer from bed to chair and chair to bed with minimal assistance using the least restrictive device within 7 day(s).  4.  Patient will ambulate with minimal assistance for 100 feet with the least restrictive device within 7 day(s).   5.  Patient will ascend/descend 4 stairs with 1 handrail(s) with moderate assistance within 7 day(s).    Outcome: Progressing   PHYSICAL THERAPY TREATMENT    Patient: Tim Morales SrCherie (68 y.o. male)  Date: 2/2/2024  Diagnosis: Small bowel obstruction (HCC) [K56.609] Small bowel obstruction (HCC)  Procedure(s) (LRB):  EXPLORATORY LAPAROTOMY, EXTENSIVE LYSIS OF ADHESIONS, APPENDECTOMY (N/A) 2 Days Post-Op  Precautions:                        ASSESSMENT:  Patient continues to benefit from skilled PT services and is slowly progressing towards goals. Patient today continues to have decreased insight into his deficits.  Is reporting increased left elbow and knee pain with movement but none at rest.  He continues to demonstrate Left UE and LE strength and coordination deficits and has MRI still pending.  Patient still with NG to suction, only able to transfer to chair due to line/tubing length.  He requires increased verbal cuing for sequencing of transfer and RW management.  Patient continues to benefit from rehab at 
  Problem: Physical Therapy - Adult  Goal: By Discharge: Performs mobility at highest level of function for planned discharge setting.  See evaluation for individualized goals.  Description: FUNCTIONAL STATUS PRIOR TO ADMISSION: Patient was independent and active without use of DME.    HOME SUPPORT PRIOR TO ADMISSION: The patient lived with wife and son but did not require assistance.    Physical Therapy Goals  Revised 2/8/24  1.  Patient will move from supine to sit and sit to supine, scoot up and down, and roll side to side in bed with supervision within 7 day(s).    2.  Patient will perform sit to stand with stand by assist within 7 day(s).  3.  Patient will transfer from bed to chair and chair to bed with contact guard assist using the least restrictive device within 7 day(s).  4.  Patient will ambulate with minimal assistance for 100 feet with the least restrictive device within 7 day(s).   5.  Patient will ascend/descend 4 stairs with 1 handrail(s) with moderate assistance within 7 day(s).    Initiated 2/1/2024  1.  Patient will move from supine to sit and sit to supine, scoot up and down, and roll side to side in bed with minimal assistance within 7 day(s).    2.  Patient will perform sit to stand with minimal assistance within 7 day(s).  3.  Patient will transfer from bed to chair and chair to bed with minimal assistance using the least restrictive device within 7 day(s).  4.  Patient will ambulate with minimal assistance for 100 feet with the least restrictive device within 7 day(s).   5.  Patient will ascend/descend 4 stairs with 1 handrail(s) with moderate assistance within 7 day(s).    Outcome: Progressing    PHYSICAL THERAPY TREATMENT    Patient: Tim Morales  (68 y.o. male)  Date: 2/9/2024  Diagnosis: Small bowel obstruction (HCC) [K56.609] Small bowel obstruction (HCC)  Procedure(s) (LRB):  EXPLORATORY LAPAROTOMY, EXTENSIVE LYSIS OF ADHESIONS, APPENDECTOMY (N/A) 9 Days Post-Op  Precautions: Fall 
  Problem: Safety - Adult  Goal: Free from fall injury  2/3/2024 2353 by Radha Bray RN  Outcome: Progressing  2/3/2024 1733 by Keysha Todd RN  Outcome: Progressing     Problem: Pain  Goal: Verbalizes/displays adequate comfort level or baseline comfort level  2/3/2024 2353 by Radha Bray, RN  Outcome: Progressing     
  Problem: Safety - Adult  Goal: Free from fall injury  Outcome: Progressing     Problem: Discharge Planning  Goal: Discharge to home or other facility with appropriate resources  Outcome: Progressing     
solving.  In today's session, patient required modA for supine-sit and Calderon for sit-stand/ pivot to chair with RW.  Further mobility limited by NG tube attached to suction.  He was instructed in LUE therapeutic exercises.  He remains below his fully independent functional baseline and would benefit from rehab at d/c.       PLAN :  Patient continues to benefit from skilled intervention to address the above impairments.  Continue treatment per established plan of care to address goals.    Recommendation for discharge: (in order for the patient to meet his/her long term goals): Therapy 3 hours/day 5-7 days/week         SUBJECTIVE:   Patient stated “This [left] knee hurts.”    OBJECTIVE DATA SUMMARY:       Functional Mobility and Transfers for ADLs:  Bed Mobility:  Bed Mobility Training  Rolling: Moderate assistance;Assist X1;Additional time  Supine to Sit: Moderate assistance;Assist X1 (MIN A of another)     Transfers:   Transfer Training  Sit to Stand: Minimum assistance;Assist X1;Additional time  Stand to Sit: Minimum assistance;Additional time;Assist X1  Bed to Chair: Minimum assistance;Assist X1;Additional time (incresaed verbal cuing for sequencing and walker managment)           Balance:   Sitting: Intact  Standing: Impaired (static fair-good, dynamic fair)              Pain Rating:  Patient reported L knee pain with activity, improving with rest/ repositioning      Activity Tolerance:   Fair   Seated in chair post-activity: /89 ().  RN aware.  HR elevated to 120s with minimal activity.    After treatment:   Patient left in no apparent distress sitting up in chair, Call bell within reach, and Bed/ chair alarm activated    COMMUNICATION/EDUCATION:   The patient's plan of care was discussed with: physical therapist and registered nurse         Thank you for this referral.  Giorgi Smith OT  Minutes: 29    
to leaving room)    Pain Rating:  No pain reported  Pain Intervention(s):   nursing notified      Activity Tolerance:   Good and requires rest breaks  Please refer to the flowsheet for vital signs taken during this treatment.    After treatment:   Patient left in no apparent distress in bed, Call bell within reach, and Bed/ chair alarm activated    COMMUNICATION/EDUCATION:   The patient's plan of care was discussed with: physical therapist and registered nurse    Patient Education  Education Given To: Patient  Education Provided: Role of Therapy;Plan of Care;Home Exercise Program;Precautions;ADL Adaptive Strategies  Education Method: Demonstration;Verbal;Teach Back  Education Outcome: Verbalized understanding;Demonstrated understanding;Continued education needed    Thank you for this referral.  Kenya Chambers OT  Minutes: 25    
digit flexion followed by full digit extension. Noted limited range left hand digit 3 due to increased edema, however gross  minus ~1/4 range,     Educated on positioning of right LE in bridge and attempt to position left LE in bridge, max assist required on left due to pain and weakness, with increased time and repetition able to tolerate ~1/2 bridge and reported pain eased off after leaving for stretch.     Pain Rating:  Not rated, significant pain response/whincing with left knee flexion in supine, no complaint seated edge of bed   Pain Intervention(s):   rest, elevation, and repositioning      Activity Tolerance:   Fair   Please refer to the flowsheet for vital signs taken during this treatment.    After treatment:   Patient left in no apparent distress in bed, Call bell within reach, Bed/ chair alarm activated, Side rails x3, Heels elevated for pressure relief, and bilateral Ue's elevated    COMMUNICATION/EDUCATION:   The patient's plan of care was discussed with: physical therapist, registered nurse, and     Patient Education  Education Given To: Patient  Education Provided: Role of Therapy;Transfer Training;Fall Prevention Strategies  Education Method: Demonstration;Verbal  Barriers to Learning: Cognition  Education Outcome: Verbalized understanding;Continued education needed    Thank you for this referral.  Deysi White, OTR/L  Minutes: 49    
intact  Initiation: Requires cues for all  Sequencing: Requires cues for some  Cognition Comment: high fear of harm to surgical site    Hearing:      WFL  Vision/Perceptual:        WFL          Strength:    Strength: Generally decreased, functional    Tone & Sensation:   Tone: Normal       Range Of Motion:  AROM: Generally decreased, functional       Functional Mobility:  Bed Mobility:     Bed Mobility Training  Rolling: Additional time;Minimum assistance;Adaptive equipment  Supine to Sit: Additional time;Minimum assistance;Adaptive equipment;Assist X2  Scooting: Contact-guard assistance  Transfers:     Transfer Training  Interventions: Verbal cues;Safety awareness training  Sit to Stand: Additional time;Adaptive equipment;Minimum assistance  Stand to Sit: Minimum assistance;Moderate assistance;Additional time;Adaptive equipment (cues for approach, LLE positioning for pain management, slow/controlled descent)  Balance:               Balance  Sitting: Without support  Sitting - Static: Good (unsupported)  Sitting - Dynamic: Good (unsupported)  Standing: With support  Standing - Static: Fair  Standing - Dynamic: Fair  Ambulation/Gait Training:                       Gait  Overall Level of Assistance: Minimum assistance  Distance (ft): 15 Feet  Assistive Device: Walker, rolling;Gait belt  Interventions: Verbal cues;Safety awareness training                                                                                                                                                                                                                                                          [facility] AM-PAC®      Basic Mobility Inpatient Short Form (6-Clicks) Version 2    How much help is needed turning from your back to your side while in a flat bed without using bedrails?: A Lot  How much help is needed moving from lying on your back to sitting on the side of a flat bed without using bedrails?: A Lot  How much help 
                                                                     Pain Ratin/10   Pain Intervention(s):   pain is at a level acceptable to the patient    Activity Tolerance:   Fair     After treatment:   Patient left in no apparent distress sitting up in chair, Call bell within reach, and Bed/ chair alarm activated    COMMUNICATION/EDUCATION:   The patient's plan of care was discussed with: registered nurse         Thank you for this referral.  Carolina Mai, PT  Minutes: 46

## 2024-02-15 ENCOUNTER — HOSPITAL ENCOUNTER (EMERGENCY)
Facility: HOSPITAL | Age: 69
Discharge: HOME | End: 2024-02-15
Attending: EMERGENCY MEDICINE
Payer: MEDICARE

## 2024-02-15 ENCOUNTER — APPOINTMENT (OUTPATIENT)
Facility: HOSPITAL | Age: 69
End: 2024-02-15
Payer: MEDICARE

## 2024-02-15 VITALS
OXYGEN SATURATION: 100 % | SYSTOLIC BLOOD PRESSURE: 178 MMHG | TEMPERATURE: 98.2 F | RESPIRATION RATE: 17 BRPM | HEART RATE: 74 BPM | DIASTOLIC BLOOD PRESSURE: 112 MMHG

## 2024-02-15 DIAGNOSIS — T14.8XXA HEMATOMA: Primary | ICD-10-CM

## 2024-02-15 DIAGNOSIS — T81.9XXA REACTION AT SURGICAL SITE, INITIAL ENCOUNTER: ICD-10-CM

## 2024-02-15 LAB
ALBUMIN SERPL-MCNC: 2.4 G/DL (ref 3.5–5)
ALBUMIN/GLOB SERPL: 0.6 (ref 1.1–2.2)
ALP SERPL-CCNC: 100 U/L (ref 45–117)
ALT SERPL-CCNC: 26 U/L (ref 12–78)
ANION GAP SERPL CALC-SCNC: 8 MMOL/L (ref 5–15)
AST SERPL W P-5'-P-CCNC: 17 U/L (ref 15–37)
BASOPHILS # BLD: 0 K/UL (ref 0–0.1)
BASOPHILS NFR BLD: 0 % (ref 0–1)
BILIRUB SERPL-MCNC: 0.5 MG/DL (ref 0.2–1)
BUN SERPL-MCNC: 30 MG/DL (ref 6–20)
BUN/CREAT SERPL: 18 (ref 12–20)
CA-I BLD-MCNC: 8.2 MG/DL (ref 8.5–10.1)
CHLORIDE SERPL-SCNC: 106 MMOL/L (ref 97–108)
CO2 SERPL-SCNC: 29 MMOL/L (ref 21–32)
CREAT SERPL-MCNC: 1.67 MG/DL (ref 0.7–1.3)
DIFFERENTIAL METHOD BLD: ABNORMAL
EOSINOPHIL # BLD: 0.1 K/UL (ref 0–0.4)
EOSINOPHIL NFR BLD: 1 % (ref 0–7)
ERYTHROCYTE [DISTWIDTH] IN BLOOD BY AUTOMATED COUNT: 15.9 % (ref 11.5–14.5)
GLOBULIN SER CALC-MCNC: 4.2 G/DL (ref 2–4)
GLUCOSE SERPL-MCNC: 130 MG/DL (ref 65–100)
HCT VFR BLD AUTO: 23.7 % (ref 36.6–50.3)
HGB BLD-MCNC: 7.6 G/DL (ref 12.1–17)
IMM GRANULOCYTES # BLD AUTO: 0.1 K/UL (ref 0–0.04)
IMM GRANULOCYTES NFR BLD AUTO: 1 % (ref 0–0.5)
INR PPP: 1.5 (ref 0.9–1.1)
LYMPHOCYTES # BLD: 0.7 K/UL (ref 0.8–3.5)
LYMPHOCYTES NFR BLD: 6 % (ref 12–49)
MCH RBC QN AUTO: 29.5 PG (ref 26–34)
MCHC RBC AUTO-ENTMCNC: 32.1 G/DL (ref 30–36.5)
MCV RBC AUTO: 91.9 FL (ref 80–99)
MONOCYTES # BLD: 0.7 K/UL (ref 0–1)
MONOCYTES NFR BLD: 6 % (ref 5–13)
NEUTS SEG # BLD: 9.9 K/UL (ref 1.8–8)
NEUTS SEG NFR BLD: 86 % (ref 32–75)
NRBC # BLD: 0.02 K/UL (ref 0–0.01)
NRBC BLD-RTO: 0.2 PER 100 WBC
PLATELET # BLD AUTO: 261 K/UL (ref 150–400)
PMV BLD AUTO: 8.6 FL (ref 8.9–12.9)
POTASSIUM SERPL-SCNC: 2.8 MMOL/L (ref 3.5–5.1)
PROT SERPL-MCNC: 6.6 G/DL (ref 6.4–8.2)
PROTHROMBIN TIME: 17.9 SEC (ref 11.9–14.6)
RBC # BLD AUTO: 2.58 M/UL (ref 4.1–5.7)
SODIUM SERPL-SCNC: 143 MMOL/L (ref 136–145)
WBC # BLD AUTO: 11.5 K/UL (ref 4.1–11.1)

## 2024-02-15 PROCEDURE — 36415 COLL VENOUS BLD VENIPUNCTURE: CPT

## 2024-02-15 PROCEDURE — 99285 EMERGENCY DEPT VISIT HI MDM: CPT

## 2024-02-15 PROCEDURE — 74177 CT ABD & PELVIS W/CONTRAST: CPT

## 2024-02-15 PROCEDURE — 80053 COMPREHEN METABOLIC PANEL: CPT

## 2024-02-15 PROCEDURE — 6360000004 HC RX CONTRAST MEDICATION: Performed by: EMERGENCY MEDICINE

## 2024-02-15 PROCEDURE — 85610 PROTHROMBIN TIME: CPT

## 2024-02-15 PROCEDURE — 85025 COMPLETE CBC W/AUTO DIFF WBC: CPT

## 2024-02-15 RX ADMIN — IOPAMIDOL 100 ML: 755 INJECTION, SOLUTION INTRAVENOUS at 15:50

## 2024-02-15 RX ADMIN — DIATRIZOATE MEGLUMINE AND DIATRIZOATE SODIUM 30 ML: 660; 100 LIQUID ORAL; RECTAL at 15:50

## 2024-02-15 ASSESSMENT — PAIN - FUNCTIONAL ASSESSMENT: PAIN_FUNCTIONAL_ASSESSMENT: 0-10

## 2024-02-15 ASSESSMENT — PAIN SCALES - GENERAL: PAINLEVEL_OUTOF10: 0

## 2024-02-15 NOTE — ED TRIAGE NOTES
PT present for surgical wound evaluation to abdomen s/p bowel obstruction repair. PT reports home health came today and reported an increase of blood on dressing, per ems nurse sent photos of wound to surgeon who reported there was no issues noted with wound visually. Pt presents per family request. PT reports mild lower abd pain that he has had intermittently since surgery. Pt reports he takes eliquis as well.

## 2024-02-15 NOTE — ED PROVIDER NOTES
[facility] EMERGENCY DEPT  EMERGENCY DEPARTMENT HISTORY AND PHYSICAL EXAM      Date: 2/15/2024  Patient Name: Tim Morales Sr.  MRN: [id number]  Birthdate 1955  Date of evaluation: 2/15/2024  Provider: Saurabh Alvares MD   Note Started: 1:37 PM EST 2/15/24    HISTORY OF PRESENT ILLNESS     Chief Complaint   Patient presents with    Surgical Wound Evaluation       History Provided By: Patient    HPI: Tim Morales Sr. is a 68 y.o. male was seen by home health today for wound care s/p sbo operation. Discharged home on Monday after surgical intervention.     Notes pain has improved, no noted drainage.     Nursing at home reported concerns of drainage and bleeding.     PAST MEDICAL HISTORY   Past Medical History:  Past Medical History:   Diagnosis Date    A-fib (HCC)     Hyperlipidemia     Hypertension        Past Surgical History:  Past Surgical History:   Procedure Laterality Date    ABDOMEN SURGERY      unknown reason    ANKLE SURGERY Right     bone spurs removed    COLONOSCOPY      COLONOSCOPY N/A 5/11/2023    COLONOSCOPY DIAGNOSTIC performed by Lalit Villavicencio Jr., MD at [facility] ENDOSCOPY    FRACTURE SURGERY Bilateral     IR NONTUNNELED VASCULAR CATHETER  2/5/2024    IR NONTUNNELED VASCULAR CATHETER 2/5/2024 [facility] CARDIAC CATH/EP/IR LAB    LAPAROTOMY N/A 1/31/2024    EXPLORATORY LAPAROTOMY, EXTENSIVE LYSIS OF ADHESIONS, APPENDECTOMY performed by Tommy Tomas MD at [facility] MAIN OR       Family History:  Family History   Problem Relation Age of Onset    Hypertension Mother     No Known Problems Father        Social History:  Social History     Tobacco Use    Smoking status: Never    Smokeless tobacco: Never   Vaping Use    Vaping Use: Never used   Substance Use Topics    Alcohol use: Yes     Alcohol/week: 6.0 standard drinks of alcohol     Types: 6 Cans of beer per week     Comment: in a weekend    Drug use: Never       Allergies:  No Known Allergies    PCP: Saurabh Goldstein MD    Current Meds:   No current

## 2024-02-15 NOTE — ED NOTES
Very pleasant. Has had 2 bms while here. Urinating without difficulty.   4x4's wet with saline and covered raised area in suture line with ABD pad- Educated wife. Supplies given. No bleeding

## (undated) DEVICE — BANDAGE, ELASTIC, COTTON/POLYESTER/SPANDEX, DUAL SELF CLOSURE, NON-STERILE, LATEX-FREE, NATURAL, 2"X5YD: Brand: TRONEX INTERNATIONAL, INC.

## (undated) DEVICE — AGENT HEMOSTATIC SURG NU-KNIT ABS 3X4IN WOVEN KNIT 12/BX

## (undated) DEVICE — YANKAUER,POOLE TIP,STERILE,50/CS: Brand: MEDLINE

## (undated) DEVICE — STERILE POLYISOPRENE POWDER-FREE SURGICAL GLOVES: Brand: PROTEXIS

## (undated) DEVICE — HYPODERMIC SAFETY NEEDLE: Brand: MAGELLAN

## (undated) DEVICE — PACK,EXTREMITY III: Brand: MEDLINE

## (undated) DEVICE — SUTURE PERMAHAND SZ 3-0 L30IN NONABSORBABLE BLK L26MM SH C017D

## (undated) DEVICE — GOWN,AURORA,FABRIC-REINFORCED,X-LARGE: Brand: MEDLINE

## (undated) DEVICE — SUTURE VCRL SZ 0 L27IN ABSRB UD L36MM CT-1 1/2 CIR J260H

## (undated) DEVICE — DRAPE FLD WRM W44XL66IN C6L FOR INTRATEMP SYS THERMABASIN

## (undated) DEVICE — SOL IRR NACL 0.9% 500ML POUR --

## (undated) DEVICE — DRESSING BORDERED ADH GZ UNIV GEN USE 8INX4IN AND 6INX2IN

## (undated) DEVICE — BLADE OPHTH MINI BEAV SHRP --

## (undated) DEVICE — GAUZE SPNG AVANT 4X4 4PLY NS --

## (undated) DEVICE — COVER,TABLE,HEAVY DUTY,77"X90",STRL: Brand: MEDLINE

## (undated) DEVICE — TUBING, SUCTION, 9/32" X 10', STRAIGHT: Brand: MEDLINE

## (undated) DEVICE — GAUZE,SPONGE,4"X4",16PLY,STRL,LF,10/TRAY: Brand: MEDLINE

## (undated) DEVICE — SOLUTION IRRIG 1000ML 0.9% SOD CHL USP POUR PLAS BTL

## (undated) DEVICE — GOWN,SIRUS,FABRNF,XL,20/CS: Brand: MEDLINE

## (undated) DEVICE — 3M™ STERI-STRIP™ REINFORCED ADHESIVE SKIN CLOSURES, R1547, 1/2 IN X 4 IN (12 MM X 100 MM), 6 STRIPS/ENVELOPE: Brand: 3M™ STERI-STRIP™

## (undated) DEVICE — GLOVE SURG 7 BIOGEL PI ULTRATOUCH G

## (undated) DEVICE — SUT ETHLN 3-0 18IN PS1 BLK --

## (undated) DEVICE — SEALER ENDOSCP NANO COAT OPN DIV CRV L JAW LIGASURE IMPACT

## (undated) DEVICE — 1LYRTR 16FR10ML100%SIL UMS SNP: Brand: MEDLINE INDUSTRIES, INC.

## (undated) DEVICE — YANKAUER,BULB TIP,W/O VENT,RIGID,STERILE: Brand: MEDLINE

## (undated) DEVICE — MARKER SKN REG TIP W/RULER -- STRL

## (undated) DEVICE — SYRINGE,EAR/ULCER, 2 OZ, STERILE: Brand: MEDLINE

## (undated) DEVICE — PADDING CAST W4INXL4YD SYN NAT PROTOUCH

## (undated) DEVICE — NAKAO SPIDER-NET RETRIEVAL DEVICE 230 X 2.5 X 5.5 CM: Brand: NAKAO

## (undated) DEVICE — CANISTER, RIGID, 3000CC: Brand: MEDLINE INDUSTRIES, INC.

## (undated) DEVICE — SUTURE MCRYL SZ 4-0 L27IN ABSRB UD L19MM PS-2 1/2 CIR PRIM Y426H

## (undated) DEVICE — BLANKET WRM W35.4XL86.6IN FULL UNDERBODY + FORC AIR

## (undated) DEVICE — 1200CC GUARDIAN II: Brand: GUARDIAN

## (undated) DEVICE — BLADE CLIPPER GEN PURP NS

## (undated) DEVICE — SUTURE MCRYL SZ 3-0 L27IN ABSRB UD L26MM SH 1/2 CIR Y416H

## (undated) DEVICE — SPONGE LAPAROTOMY W18XL18IN WHITE STRUNG RADIOPAQUE STERILE

## (undated) DEVICE — INTENDED FOR TISSUE SEPARATION, AND OTHER PROCEDURES THAT REQUIRE A SHARP SURGICAL BLADE TO PUNCTURE OR CUT.: Brand: BARD-PARKER SAFETY BLADES SIZE 15, STERILE

## (undated) DEVICE — SPONGE GZ 4X4 IN 16-PLY DETECTABLE W/ DMT MSTR TAG

## (undated) DEVICE — 1000 S-DRAPE TOWEL DRAPE 10/BX: Brand: STERI-DRAPE™

## (undated) DEVICE — SUTURE VCRL + SZ 3-0 L27IN ABSRB UD L26MM SH 1/2 CIR VCP416H

## (undated) DEVICE — SOLUTION IRRIG 1000ML STRL H2O USP PLAS POUR BTL

## (undated) DEVICE — CUTTER ENDOSCP L340MM LIN ARTC SGL STROKE FIRING ENDOPATH

## (undated) DEVICE — 3M™ COBAN™ NL STERILE NON-LATEX SELF-ADHERENT WRAP, 2084S, 4 IN X 5 YD (10 CM X 4,5 M), 18 ROLLS/CASE: Brand: 3M™ COBAN™

## (undated) DEVICE — TOWEL,OR,DSP,ST,BLUE,STD,4/PK,20PK/CS: Brand: MEDLINE

## (undated) DEVICE — TRANSFER SET 3": Brand: MEDLINE INDUSTRIES, INC.

## (undated) DEVICE — PAD,PREPPING,CUFFED,24X48,7",NONSTERILE: Brand: MEDLINE

## (undated) DEVICE — LINE SAMPLING ADVANCE ORAL NASAL MICROSTREAM O2 TUBING 6.5'

## (undated) DEVICE — LAPAROTOMY-SFMC: Brand: MEDLINE INDUSTRIES, INC.

## (undated) DEVICE — DRESSING,GAUZE,XEROFORM,CURAD,1"X8",ST: Brand: CURAD

## (undated) DEVICE — PENCIL SMK EVAC L10FT TBNG NONSTICK ESU BLDE PLUMEPEN ELITE

## (undated) DEVICE — BIPOLAR FORCEPS CORD: Brand: VALLEYLAB

## (undated) DEVICE — STOCKINETTE ORTHOPEDIC IMPERV 36X9 IN STRL

## (undated) DEVICE — RELOAD STPL SZ 0 L45MM DIA3.5MM 0DEG STD REG TISS BLU TI

## (undated) DEVICE — DISPOSABLE BIPOLAR CODE, 12' (3.66 M): Brand: CONMED

## (undated) DEVICE — INSTRUMENT ORTH L6IN LNG S STL SGL IN TOME

## (undated) DEVICE — SHEET,DRAPE,70X100,STERILE: Brand: MEDLINE

## (undated) DEVICE — JELLY,LUBE,STERILE,FLIP TOP,TUBE,4-OZ: Brand: MEDLINE

## (undated) DEVICE — DRAIN WND PENRS RADPQ 0.5X18IN --

## (undated) DEVICE — PAD,ABDOMINAL,5"X9",STERILE,LF,1/PK: Brand: MEDLINE INDUSTRIES, INC.

## (undated) DEVICE — GLOVE ORANGE PI 7 1/2   MSG9075

## (undated) DEVICE — SYR 20ML LL STRL LF --

## (undated) DEVICE — SPONGE GZ W4XL4IN COT 12 PLY TYP VII WVN C FLD DSGN STERILE

## (undated) DEVICE — GLOVE ORTHO 8   MSG9480

## (undated) DEVICE — STERILE POLYISOPRENE POWDER-FREE SURGICAL GLOVES WITH EMOLLIENT COATING: Brand: PROTEXIS

## (undated) DEVICE — BANDAGE, ELASTIC, COTTON/POLYESTER/SPANDEX, DUAL SELF CLOSURE, NON-STERILE, LATEX-FREE, NATURAL, 6"X5YD: Brand: TRONEX INTERNATIONAL, INC.

## (undated) DEVICE — SUTURE MCRYL SZ 4-0 L18IN ABSRB UD L19MM PS-2 3/8 CIR PRIM Y496G

## (undated) DEVICE — LARGE, DISPOSABLE ALEXIS O C-SECTION PROTECTOR - RETRACTOR: Brand: ALEXIS ® O C-SECTION PROTECTOR - RETRACTOR

## (undated) DEVICE — COUNTER NDL 40 COUNT HLD 70 FOAM BLK ADH W/ MAG

## (undated) DEVICE — ZIMMER® STERILE DISPOSABLE TOURNIQUET CUFF WITH PLC, DUAL PORT, SINGLE BLADDER, 18 IN. (46 CM)

## (undated) DEVICE — SUTURE PDS II SZ 0 L36IN ABSRB VLT L40MM CT 1/2 CIR Z358T

## (undated) DEVICE — GOWN,PRECEPT, XLNG/XLRG ,STRL: Brand: MEDLINE

## (undated) DEVICE — FORCEPS BX L240CM JAW DIA2.4MM ORNG L CAP W/ NDL DISP RAD

## (undated) DEVICE — UNDERGLOVE SURG SZ 7.5 BLU LTX FREE SYN POLYISOPRENE

## (undated) DEVICE — SINGLE-USE POLYPECTOMY SNARE: Brand: CAPTIFLEX

## (undated) DEVICE — (D)PREP SKN CHLRAPRP APPL 26ML -- CONVERT TO ITEM 371833

## (undated) DEVICE — SUTURE PDS II SZ 0 L36IN ABSRB VLT L36MM CT-1 1/2 CIR Z346H

## (undated) DEVICE — SURGIFOAM SPNG SZ 100

## (undated) DEVICE — ELECTRODE PT RET AD L9FT HI MOIST COND ADH HYDRGEL CORDED

## (undated) DEVICE — POLYP TRAP: Brand: TRAPEASE®

## (undated) DEVICE — Z CONVERTED USE 2271391 BANDAGE COMPRESSION W4INXL4YD ELASTIC ESMARCH

## (undated) DEVICE — PADDING CAST W3INXL4YD SYN NAT PROTOUCH